# Patient Record
Sex: FEMALE | Race: BLACK OR AFRICAN AMERICAN | Employment: UNEMPLOYED | URBAN - METROPOLITAN AREA
[De-identification: names, ages, dates, MRNs, and addresses within clinical notes are randomized per-mention and may not be internally consistent; named-entity substitution may affect disease eponyms.]

---

## 2017-02-06 DIAGNOSIS — E78.2 MIXED HYPERLIPIDEMIA: ICD-10-CM

## 2017-02-09 RX ORDER — ATORVASTATIN CALCIUM 40 MG/1
TABLET, FILM COATED ORAL
Qty: 30 TABLET | Refills: 5 | Status: SHIPPED | OUTPATIENT
Start: 2017-02-09 | End: 2017-08-05 | Stop reason: SDUPTHER

## 2017-05-23 ENCOUNTER — HOSPITAL ENCOUNTER (OUTPATIENT)
Age: 72
Setting detail: SPECIMEN
Discharge: HOME | End: 2017-05-23
Payer: COMMERCIAL

## 2017-05-23 DIAGNOSIS — N18.2 CKD (CHRONIC KIDNEY DISEASE), STAGE 2 (MILD): ICD-10-CM

## 2017-05-23 LAB
ANION GAP SERPL CALCULATED.3IONS-SCNC: 15 MMOL/L (ref 9–17)
BUN BLDV-MCNC: 21 MG/DL (ref 8–23)
BUN/CREAT BLD: ABNORMAL (ref 9–20)
CALCIUM SERPL-MCNC: 9.8 MG/DL (ref 8.6–10.4)
CHLORIDE BLD-SCNC: 101 MMOL/L (ref 98–107)
CO2: 26 MMOL/L (ref 20–31)
CREAT SERPL-MCNC: 1.21 MG/DL (ref 0.5–0.9)
GFR AFRICAN AMERICAN: 53 ML/MIN
GFR NON-AFRICAN AMERICAN: 44 ML/MIN
GFR SERPL CREATININE-BSD FRML MDRD: ABNORMAL ML/MIN/{1.73_M2}
GFR SERPL CREATININE-BSD FRML MDRD: ABNORMAL ML/MIN/{1.73_M2}
GLUCOSE BLD-MCNC: 94 MG/DL (ref 70–99)
POTASSIUM SERPL-SCNC: 3.8 MMOL/L (ref 3.7–5.3)
SODIUM BLD-SCNC: 142 MMOL/L (ref 135–144)

## 2017-05-23 PROCEDURE — 80048 BASIC METABOLIC PNL TOTAL CA: CPT

## 2017-05-23 PROCEDURE — 36415 COLL VENOUS BLD VENIPUNCTURE: CPT

## 2017-05-25 ENCOUNTER — OFFICE VISIT (OUTPATIENT)
Dept: INTERNAL MEDICINE | Age: 72
End: 2017-05-25
Payer: COMMERCIAL

## 2017-05-25 VITALS
DIASTOLIC BLOOD PRESSURE: 74 MMHG | HEART RATE: 59 BPM | HEIGHT: 63 IN | WEIGHT: 172.2 LBS | BODY MASS INDEX: 30.51 KG/M2 | SYSTOLIC BLOOD PRESSURE: 126 MMHG

## 2017-05-25 DIAGNOSIS — J42 CHRONIC BRONCHITIS, UNSPECIFIED CHRONIC BRONCHITIS TYPE (HCC): ICD-10-CM

## 2017-05-25 DIAGNOSIS — E78.2 MIXED HYPERLIPIDEMIA: ICD-10-CM

## 2017-05-25 DIAGNOSIS — I10 ESSENTIAL HYPERTENSION: ICD-10-CM

## 2017-05-25 DIAGNOSIS — J43.9 PULMONARY EMPHYSEMA, UNSPECIFIED EMPHYSEMA TYPE (HCC): ICD-10-CM

## 2017-05-25 DIAGNOSIS — Z87.891 FORMER CIGAR SMOKER: Primary | ICD-10-CM

## 2017-05-25 DIAGNOSIS — N18.2 CKD (CHRONIC KIDNEY DISEASE), STAGE 2 (MILD): ICD-10-CM

## 2017-05-25 DIAGNOSIS — Z23 NEED FOR TDAP VACCINATION: ICD-10-CM

## 2017-05-25 PROCEDURE — 90471 IMMUNIZATION ADMIN: CPT | Performed by: INTERNAL MEDICINE

## 2017-05-25 PROCEDURE — 99213 OFFICE O/P EST LOW 20 MIN: CPT | Performed by: INTERNAL MEDICINE

## 2017-05-25 PROCEDURE — 90715 TDAP VACCINE 7 YRS/> IM: CPT | Performed by: INTERNAL MEDICINE

## 2017-05-25 RX ORDER — ALBUTEROL SULFATE 90 UG/1
2 AEROSOL, METERED RESPIRATORY (INHALATION) EVERY 6 HOURS PRN
Qty: 1 INHALER | Refills: 5 | Status: SHIPPED | OUTPATIENT
Start: 2017-05-25 | End: 2018-03-26 | Stop reason: SDUPTHER

## 2017-05-25 RX ORDER — AMLODIPINE BESYLATE 2.5 MG/1
TABLET ORAL
Qty: 30 TABLET | Refills: 5 | Status: SHIPPED | OUTPATIENT
Start: 2017-05-25 | End: 2018-01-02 | Stop reason: SDUPTHER

## 2017-06-29 ENCOUNTER — APPOINTMENT (OUTPATIENT)
Dept: GENERAL RADIOLOGY | Age: 72
End: 2017-06-29
Payer: COMMERCIAL

## 2017-06-29 ENCOUNTER — HOSPITAL ENCOUNTER (OUTPATIENT)
Age: 72
Setting detail: OBSERVATION
Discharge: HOME | End: 2017-06-30
Attending: EMERGENCY MEDICINE | Admitting: EMERGENCY MEDICINE
Payer: COMMERCIAL

## 2017-06-29 DIAGNOSIS — R94.31 ABNORMAL EKG: ICD-10-CM

## 2017-06-29 DIAGNOSIS — R10.13 ABDOMINAL PAIN, EPIGASTRIC: Primary | ICD-10-CM

## 2017-06-29 LAB
ALBUMIN SERPL-MCNC: 4.3 G/DL (ref 3.5–5.2)
ALBUMIN/GLOBULIN RATIO: 1.4 (ref 1–2.5)
ALP BLD-CCNC: 91 U/L (ref 35–104)
ALT SERPL-CCNC: 36 U/L (ref 5–33)
ANION GAP SERPL CALCULATED.3IONS-SCNC: 13 MMOL/L (ref 9–17)
AST SERPL-CCNC: 66 U/L
BILIRUB SERPL-MCNC: 0.52 MG/DL (ref 0.3–1.2)
BILIRUBIN DIRECT: 0.19 MG/DL
BILIRUBIN, INDIRECT: 0.33 MG/DL (ref 0–1)
BUN BLDV-MCNC: 21 MG/DL (ref 8–23)
BUN/CREAT BLD: ABNORMAL (ref 9–20)
CALCIUM SERPL-MCNC: 10.2 MG/DL (ref 8.6–10.4)
CHLORIDE BLD-SCNC: 99 MMOL/L (ref 98–107)
CO2: 26 MMOL/L (ref 20–31)
CREAT SERPL-MCNC: 1.1 MG/DL (ref 0.5–0.9)
GFR AFRICAN AMERICAN: 60 ML/MIN
GFR NON-AFRICAN AMERICAN: 49 ML/MIN
GFR SERPL CREATININE-BSD FRML MDRD: ABNORMAL ML/MIN/{1.73_M2}
GFR SERPL CREATININE-BSD FRML MDRD: ABNORMAL ML/MIN/{1.73_M2}
GLOBULIN: ABNORMAL G/DL (ref 1.5–3.8)
GLUCOSE BLD-MCNC: 126 MG/DL (ref 70–99)
LACTIC ACID, WHOLE BLOOD: 1.9 MMOL/L (ref 0.7–2.1)
LIPASE: 34 U/L (ref 13–60)
POTASSIUM SERPL-SCNC: 3.8 MMOL/L (ref 3.7–5.3)
SODIUM BLD-SCNC: 138 MMOL/L (ref 135–144)
TOTAL PROTEIN: 7.4 G/DL (ref 6.4–8.3)
TROPONIN INTERP: NORMAL
TROPONIN T: <0.03 NG/ML

## 2017-06-29 PROCEDURE — 99285 EMERGENCY DEPT VISIT HI MDM: CPT

## 2017-06-29 PROCEDURE — 96374 THER/PROPH/DIAG INJ IV PUSH: CPT

## 2017-06-29 PROCEDURE — 36415 COLL VENOUS BLD VENIPUNCTURE: CPT

## 2017-06-29 PROCEDURE — 96375 TX/PRO/DX INJ NEW DRUG ADDON: CPT

## 2017-06-29 PROCEDURE — 93005 ELECTROCARDIOGRAM TRACING: CPT

## 2017-06-29 PROCEDURE — 96376 TX/PRO/DX INJ SAME DRUG ADON: CPT

## 2017-06-29 PROCEDURE — G0378 HOSPITAL OBSERVATION PER HR: HCPCS

## 2017-06-29 PROCEDURE — 2500000003 HC RX 250 WO HCPCS: Performed by: EMERGENCY MEDICINE

## 2017-06-29 PROCEDURE — 83605 ASSAY OF LACTIC ACID: CPT

## 2017-06-29 PROCEDURE — 84484 ASSAY OF TROPONIN QUANT: CPT

## 2017-06-29 PROCEDURE — 96372 THER/PROPH/DIAG INJ SC/IM: CPT

## 2017-06-29 PROCEDURE — 80048 BASIC METABOLIC PNL TOTAL CA: CPT

## 2017-06-29 PROCEDURE — 80076 HEPATIC FUNCTION PANEL: CPT

## 2017-06-29 PROCEDURE — 6360000002 HC RX W HCPCS: Performed by: EMERGENCY MEDICINE

## 2017-06-29 PROCEDURE — 6370000000 HC RX 637 (ALT 250 FOR IP): Performed by: EMERGENCY MEDICINE

## 2017-06-29 PROCEDURE — S0028 INJECTION, FAMOTIDINE, 20 MG: HCPCS | Performed by: EMERGENCY MEDICINE

## 2017-06-29 PROCEDURE — 2580000003 HC RX 258: Performed by: EMERGENCY MEDICINE

## 2017-06-29 PROCEDURE — 83690 ASSAY OF LIPASE: CPT

## 2017-06-29 PROCEDURE — 71010 XR CHEST PORTABLE: CPT

## 2017-06-29 RX ORDER — FENTANYL CITRATE 50 UG/ML
25 INJECTION, SOLUTION INTRAMUSCULAR; INTRAVENOUS ONCE
Status: COMPLETED | OUTPATIENT
Start: 2017-06-29 | End: 2017-06-29

## 2017-06-29 RX ORDER — ACETAMINOPHEN 325 MG/1
650 TABLET ORAL EVERY 4 HOURS PRN
Status: DISCONTINUED | OUTPATIENT
Start: 2017-06-29 | End: 2017-06-30 | Stop reason: HOSPADM

## 2017-06-29 RX ORDER — MORPHINE SULFATE 2 MG/ML
2 INJECTION, SOLUTION INTRAMUSCULAR; INTRAVENOUS
Status: DISCONTINUED | OUTPATIENT
Start: 2017-06-29 | End: 2017-06-30 | Stop reason: HOSPADM

## 2017-06-29 RX ORDER — HYDROCHLOROTHIAZIDE 25 MG/1
25 TABLET ORAL DAILY
Status: DISCONTINUED | OUTPATIENT
Start: 2017-06-29 | End: 2017-06-30 | Stop reason: HOSPADM

## 2017-06-29 RX ORDER — LISINOPRIL 20 MG/1
20 TABLET ORAL DAILY
Status: DISCONTINUED | OUTPATIENT
Start: 2017-06-29 | End: 2017-06-30 | Stop reason: HOSPADM

## 2017-06-29 RX ORDER — ALBUTEROL SULFATE 90 UG/1
2 AEROSOL, METERED RESPIRATORY (INHALATION) EVERY 6 HOURS PRN
Status: DISCONTINUED | OUTPATIENT
Start: 2017-06-29 | End: 2017-06-30 | Stop reason: HOSPADM

## 2017-06-29 RX ORDER — SODIUM CHLORIDE 0.9 % (FLUSH) 0.9 %
10 SYRINGE (ML) INJECTION PRN
Status: DISCONTINUED | OUTPATIENT
Start: 2017-06-29 | End: 2017-06-30 | Stop reason: HOSPADM

## 2017-06-29 RX ORDER — 0.9 % SODIUM CHLORIDE 0.9 %
1000 INTRAVENOUS SOLUTION INTRAVENOUS ONCE
Status: COMPLETED | OUTPATIENT
Start: 2017-06-29 | End: 2017-06-29

## 2017-06-29 RX ORDER — SODIUM CHLORIDE 0.9 % (FLUSH) 0.9 %
10 SYRINGE (ML) INJECTION EVERY 12 HOURS SCHEDULED
Status: DISCONTINUED | OUTPATIENT
Start: 2017-06-29 | End: 2017-06-30 | Stop reason: HOSPADM

## 2017-06-29 RX ORDER — MORPHINE SULFATE 4 MG/ML
4 INJECTION, SOLUTION INTRAMUSCULAR; INTRAVENOUS
Status: DISCONTINUED | OUTPATIENT
Start: 2017-06-29 | End: 2017-06-30 | Stop reason: HOSPADM

## 2017-06-29 RX ORDER — ONDANSETRON 2 MG/ML
4 INJECTION INTRAMUSCULAR; INTRAVENOUS ONCE
Status: COMPLETED | OUTPATIENT
Start: 2017-06-29 | End: 2017-06-29

## 2017-06-29 RX ORDER — LISINOPRIL AND HYDROCHLOROTHIAZIDE 25; 20 MG/1; MG/1
1 TABLET ORAL DAILY
Status: DISCONTINUED | OUTPATIENT
Start: 2017-06-29 | End: 2017-06-29

## 2017-06-29 RX ORDER — DICYCLOMINE HYDROCHLORIDE 10 MG/ML
20 INJECTION INTRAMUSCULAR ONCE
Status: DISCONTINUED | OUTPATIENT
Start: 2017-06-29 | End: 2017-06-30 | Stop reason: HOSPADM

## 2017-06-29 RX ORDER — AMLODIPINE BESYLATE 2.5 MG/1
2.5 TABLET ORAL DAILY
Status: DISCONTINUED | OUTPATIENT
Start: 2017-06-29 | End: 2017-06-30 | Stop reason: HOSPADM

## 2017-06-29 RX ORDER — ATORVASTATIN CALCIUM 40 MG/1
40 TABLET, FILM COATED ORAL NIGHTLY
Status: DISCONTINUED | OUTPATIENT
Start: 2017-06-29 | End: 2017-06-30 | Stop reason: HOSPADM

## 2017-06-29 RX ADMIN — Medication 10 ML: at 20:24

## 2017-06-29 RX ADMIN — FENTANYL CITRATE 25 MCG: 50 INJECTION, SOLUTION INTRAMUSCULAR; INTRAVENOUS at 14:41

## 2017-06-29 RX ADMIN — SODIUM CHLORIDE 1000 ML: 9 INJECTION, SOLUTION INTRAVENOUS at 13:16

## 2017-06-29 RX ADMIN — ATORVASTATIN CALCIUM 40 MG: 40 TABLET, FILM COATED ORAL at 20:23

## 2017-06-29 RX ADMIN — FAMOTIDINE 20 MG: 10 INJECTION, SOLUTION INTRAVENOUS at 13:16

## 2017-06-29 RX ADMIN — ONDANSETRON 4 MG: 2 INJECTION, SOLUTION INTRAMUSCULAR; INTRAVENOUS at 13:16

## 2017-06-29 RX ADMIN — ENOXAPARIN SODIUM 40 MG: 40 INJECTION SUBCUTANEOUS at 17:40

## 2017-06-29 RX ADMIN — FENTANYL CITRATE 25 MCG: 50 INJECTION, SOLUTION INTRAMUSCULAR; INTRAVENOUS at 13:16

## 2017-06-29 RX ADMIN — MORPHINE SULFATE 4 MG: 4 INJECTION, SOLUTION INTRAMUSCULAR; INTRAVENOUS at 20:24

## 2017-06-29 ASSESSMENT — PAIN DESCRIPTION - ORIENTATION
ORIENTATION: MID;UPPER
ORIENTATION: MID;UPPER

## 2017-06-29 ASSESSMENT — PAIN SCALES - GENERAL
PAINLEVEL_OUTOF10: 8
PAINLEVEL_OUTOF10: 7
PAINLEVEL_OUTOF10: 8
PAINLEVEL_OUTOF10: 0
PAINLEVEL_OUTOF10: 8
PAINLEVEL_OUTOF10: 6

## 2017-06-29 ASSESSMENT — ENCOUNTER SYMPTOMS
COLOR CHANGE: 0
ABDOMINAL PAIN: 1
BLOOD IN STOOL: 0
CONSTIPATION: 0
DIARRHEA: 0
SHORTNESS OF BREATH: 0
NAUSEA: 1
VOMITING: 0
WHEEZING: 0

## 2017-06-29 ASSESSMENT — PAIN DESCRIPTION - DESCRIPTORS
DESCRIPTORS: ACHING
DESCRIPTORS: ACHING

## 2017-06-29 ASSESSMENT — PAIN DESCRIPTION - PAIN TYPE
TYPE: ACUTE PAIN
TYPE: ACUTE PAIN;CHRONIC PAIN

## 2017-06-29 ASSESSMENT — PAIN DESCRIPTION - FREQUENCY
FREQUENCY: CONTINUOUS
FREQUENCY: CONTINUOUS

## 2017-06-29 ASSESSMENT — PAIN DESCRIPTION - LOCATION
LOCATION: CHEST
LOCATION: ABDOMEN

## 2017-06-29 ASSESSMENT — HEART SCORE: ECG: 1

## 2017-06-29 ASSESSMENT — PAIN DESCRIPTION - ONSET
ONSET: SUDDEN
ONSET: ON-GOING

## 2017-06-30 ENCOUNTER — APPOINTMENT (OUTPATIENT)
Dept: NUCLEAR MEDICINE | Age: 72
End: 2017-06-30
Payer: COMMERCIAL

## 2017-06-30 VITALS
DIASTOLIC BLOOD PRESSURE: 77 MMHG | OXYGEN SATURATION: 94 % | TEMPERATURE: 97.9 F | BODY MASS INDEX: 30.49 KG/M2 | HEART RATE: 58 BPM | RESPIRATION RATE: 15 BRPM | SYSTOLIC BLOOD PRESSURE: 118 MMHG | HEIGHT: 63 IN | WEIGHT: 172.1 LBS

## 2017-06-30 LAB
EKG ATRIAL RATE: 48 BPM
EKG ATRIAL RATE: 51 BPM
EKG ATRIAL RATE: 53 BPM
EKG ATRIAL RATE: 71 BPM
EKG P AXIS: 61 DEGREES
EKG P AXIS: 68 DEGREES
EKG P AXIS: 70 DEGREES
EKG P AXIS: 73 DEGREES
EKG P-R INTERVAL: 158 MS
EKG P-R INTERVAL: 158 MS
EKG P-R INTERVAL: 160 MS
EKG P-R INTERVAL: 160 MS
EKG Q-T INTERVAL: 382 MS
EKG Q-T INTERVAL: 442 MS
EKG Q-T INTERVAL: 452 MS
EKG Q-T INTERVAL: 476 MS
EKG QRS DURATION: 88 MS
EKG QRS DURATION: 90 MS
EKG QRS DURATION: 96 MS
EKG QRS DURATION: 98 MS
EKG QTC CALCULATION (BAZETT): 414 MS
EKG QTC CALCULATION (BAZETT): 415 MS
EKG QTC CALCULATION (BAZETT): 416 MS
EKG QTC CALCULATION (BAZETT): 425 MS
EKG R AXIS: 11 DEGREES
EKG R AXIS: 11 DEGREES
EKG R AXIS: 25 DEGREES
EKG R AXIS: 4 DEGREES
EKG T AXIS: 23 DEGREES
EKG T AXIS: 33 DEGREES
EKG T AXIS: 34 DEGREES
EKG T AXIS: 62 DEGREES
EKG VENTRICULAR RATE: 48 BPM
EKG VENTRICULAR RATE: 51 BPM
EKG VENTRICULAR RATE: 53 BPM
EKG VENTRICULAR RATE: 71 BPM
LV EF: 55 %
LVEF MODALITY: NORMAL

## 2017-06-30 PROCEDURE — 2580000003 HC RX 258: Performed by: EMERGENCY MEDICINE

## 2017-06-30 PROCEDURE — 93005 ELECTROCARDIOGRAM TRACING: CPT

## 2017-06-30 PROCEDURE — 78452 HT MUSCLE IMAGE SPECT MULT: CPT

## 2017-06-30 PROCEDURE — A9500 TC99M SESTAMIBI: HCPCS | Performed by: EMERGENCY MEDICINE

## 2017-06-30 PROCEDURE — 3430000000 HC RX DIAGNOSTIC RADIOPHARMACEUTICAL: Performed by: EMERGENCY MEDICINE

## 2017-06-30 PROCEDURE — 6360000002 HC RX W HCPCS: Performed by: EMERGENCY MEDICINE

## 2017-06-30 PROCEDURE — G0378 HOSPITAL OBSERVATION PER HR: HCPCS

## 2017-06-30 PROCEDURE — 93017 CV STRESS TEST TRACING ONLY: CPT | Performed by: NURSE PRACTITIONER

## 2017-06-30 PROCEDURE — 96376 TX/PRO/DX INJ SAME DRUG ADON: CPT

## 2017-06-30 RX ORDER — METOPROLOL TARTRATE 5 MG/5ML
2.5 INJECTION INTRAVENOUS PRN
Status: DISCONTINUED | OUTPATIENT
Start: 2017-06-30 | End: 2017-06-30

## 2017-06-30 RX ORDER — AMINOPHYLLINE DIHYDRATE 25 MG/ML
100 INJECTION, SOLUTION INTRAVENOUS
Status: COMPLETED | OUTPATIENT
Start: 2017-06-30 | End: 2017-06-30

## 2017-06-30 RX ORDER — 0.9 % SODIUM CHLORIDE 0.9 %
500 INTRAVENOUS SOLUTION INTRAVENOUS ONCE
Status: COMPLETED | OUTPATIENT
Start: 2017-06-30 | End: 2017-06-30

## 2017-06-30 RX ORDER — SODIUM CHLORIDE 0.9 % (FLUSH) 0.9 %
10 SYRINGE (ML) INJECTION PRN
Status: DISCONTINUED | OUTPATIENT
Start: 2017-06-30 | End: 2017-06-30

## 2017-06-30 RX ORDER — SODIUM CHLORIDE 9 MG/ML
INJECTION, SOLUTION INTRAVENOUS ONCE
Status: COMPLETED | OUTPATIENT
Start: 2017-06-30 | End: 2017-06-30

## 2017-06-30 RX ORDER — SODIUM CHLORIDE 0.9 % (FLUSH) 0.9 %
10 SYRINGE (ML) INJECTION PRN
Status: DISCONTINUED | OUTPATIENT
Start: 2017-06-30 | End: 2017-06-30 | Stop reason: HOSPADM

## 2017-06-30 RX ORDER — NITROGLYCERIN 0.4 MG/1
0.4 TABLET SUBLINGUAL EVERY 5 MIN PRN
Status: DISCONTINUED | OUTPATIENT
Start: 2017-06-30 | End: 2017-06-30

## 2017-06-30 RX ADMIN — SODIUM CHLORIDE, PRESERVATIVE FREE 10 ML: 5 INJECTION INTRAVENOUS at 10:21

## 2017-06-30 RX ADMIN — TETRAKIS(2-METHOXYISOBUTYLISOCYANIDE)COPPER(I) TETRAFLUOROBORATE 15.4 MILLICURIE: 1 INJECTION, POWDER, LYOPHILIZED, FOR SOLUTION INTRAVENOUS at 10:21

## 2017-06-30 RX ADMIN — SODIUM CHLORIDE, PRESERVATIVE FREE 10 ML: 5 INJECTION INTRAVENOUS at 11:54

## 2017-06-30 RX ADMIN — SODIUM CHLORIDE 500 ML: 0.9 INJECTION, SOLUTION INTRAVENOUS at 08:43

## 2017-06-30 RX ADMIN — AMINOPHYLLINE 100 MG: 25 INJECTION, SOLUTION INTRAVENOUS at 11:56

## 2017-06-30 RX ADMIN — SODIUM CHLORIDE: 9 INJECTION, SOLUTION INTRAVENOUS at 11:45

## 2017-06-30 RX ADMIN — REGADENOSON 0.4 MG: 0.08 INJECTION, SOLUTION INTRAVENOUS at 11:54

## 2017-06-30 RX ADMIN — MORPHINE SULFATE 4 MG: 4 INJECTION, SOLUTION INTRAMUSCULAR; INTRAVENOUS at 01:00

## 2017-06-30 RX ADMIN — TETRAKIS(2-METHOXYISOBUTYLISOCYANIDE)COPPER(I) TETRAFLUOROBORATE 41.8 MILLICURIE: 1 INJECTION, POWDER, LYOPHILIZED, FOR SOLUTION INTRAVENOUS at 11:54

## 2017-06-30 ASSESSMENT — PAIN DESCRIPTION - LOCATION: LOCATION: CHEST

## 2017-06-30 ASSESSMENT — PAIN DESCRIPTION - ORIENTATION: ORIENTATION: MID;UPPER

## 2017-06-30 ASSESSMENT — PAIN DESCRIPTION - FREQUENCY: FREQUENCY: CONTINUOUS

## 2017-06-30 ASSESSMENT — PAIN DESCRIPTION - PAIN TYPE: TYPE: ACUTE PAIN

## 2017-06-30 ASSESSMENT — PAIN SCALES - GENERAL: PAINLEVEL_OUTOF10: 9

## 2017-06-30 ASSESSMENT — PAIN DESCRIPTION - DESCRIPTORS: DESCRIPTORS: ACHING

## 2017-06-30 ASSESSMENT — PAIN DESCRIPTION - ONSET: ONSET: ON-GOING

## 2017-08-05 DIAGNOSIS — E78.2 MIXED HYPERLIPIDEMIA: ICD-10-CM

## 2017-08-07 RX ORDER — ATORVASTATIN CALCIUM 40 MG/1
TABLET, FILM COATED ORAL
Qty: 30 TABLET | Refills: 5 | Status: SHIPPED | OUTPATIENT
Start: 2017-08-07 | End: 2018-02-01 | Stop reason: SDUPTHER

## 2017-11-03 DIAGNOSIS — N18.30 STAGE 3 CHRONIC KIDNEY DISEASE (HCC): ICD-10-CM

## 2017-11-03 DIAGNOSIS — I10 ESSENTIAL HYPERTENSION: ICD-10-CM

## 2017-11-03 NOTE — TELEPHONE ENCOUNTER
karan request for LISINOPRIL-HCTZ 20-25 MG TAB  future appointment scheduled.     Health Maintenance   Topic Date Due    Flu vaccine (1) 09/01/2017    Diabetes screen  07/01/2018    Breast cancer screen  11/14/2018    Lipid screen  10/18/2021    Colon cancer screen colonoscopy  11/27/2022    DTaP/Tdap/Td vaccine (2 - Td) 05/25/2027    Zostavax vaccine  Completed    DEXA (modify frequency per FRAX score)  Completed    Pneumococcal low/med risk  Completed    Hepatitis C screen  Completed             (applicable per patient's age: Cancer Screenings, Depression Screening, Fall Risk Screening, Immunizations)    Hemoglobin A1C (%)   Date Value   07/01/2015 5.7     LDL Cholesterol (mg/dL)   Date Value   10/18/2016 64     AST (U/L)   Date Value   06/29/2017 66 (H)     ALT (U/L)   Date Value   06/29/2017 36 (H)     BUN (mg/dL)   Date Value   06/29/2017 21      (goal A1C is < 7)   (goal LDL is <100) need 30-50% reduction from baseline     BP Readings from Last 3 Encounters:   06/30/17 118/77   05/25/17 126/74   11/17/16 (!) 142/69    (goal /80)      All Future Testing planned in [facility]:  Lab Frequency Next Occurrence   Lipid Panel Once 11/20/2017       Next Visit Date:  Future Appointments  Date Time Provider Suresh Powell   11/27/2017 8:30 AM Dominick Villatoro MD [facility] IM [facility]            Patient Active Problem List:     HTN (hypertension)     Mitral valve prolapse     Former cigar smoker     CKD (chronic kidney disease)     Ingrown nail of great toe of left foot     Mixed hyperlipidemia     Breast cancer screening     Chronic bronchitis ([facility] [address]     Need for Tdap vaccination

## 2017-11-04 RX ORDER — LISINOPRIL AND HYDROCHLOROTHIAZIDE 25; 20 MG/1; MG/1
TABLET ORAL
Qty: 30 TABLET | Refills: 5 | Status: SHIPPED | OUTPATIENT
Start: 2017-11-04 | End: 2017-12-18

## 2017-11-28 ENCOUNTER — TELEPHONE (OUTPATIENT)
Dept: INTERNAL MEDICINE | Age: 72
End: 2017-11-28

## 2017-11-28 NOTE — LETTER
SAVANAH Flanagan 43  [address] [facility] [address] [facility]  Phone: [phone]  Fax: [phone]    Gurmeet Montez MD        November 28, 2017    [address] [place] [address]      Dear Shashank Abdi: We are sending this letter because your PCP ordered [facility] for you to have done at your last visit here and they have not yet been completed. If you do not have the order, please stop by the office to get a print out of the order. If you do not have a follow-up appointment scheduled, please contact our office to set up an appointment, or if you stop to get a printout of the order you can make an appointment at that time. If you have any questions or concerns, please don't hesitate to call.     Sincerely,        Gurmeet Montez MD

## 2017-12-14 ENCOUNTER — HOSPITAL ENCOUNTER (OUTPATIENT)
Age: 72
Setting detail: SPECIMEN
Discharge: HOME | End: 2017-12-14
Payer: COMMERCIAL

## 2017-12-14 DIAGNOSIS — E78.2 MIXED HYPERLIPIDEMIA: ICD-10-CM

## 2017-12-14 LAB
CHOLESTEROL/HDL RATIO: 2.1
CHOLESTEROL: 152 MG/DL
HDLC SERPL-MCNC: 72 MG/DL
LDL CHOLESTEROL: 51 MG/DL (ref 0–130)
TRIGL SERPL-MCNC: 145 MG/DL
VLDLC SERPL CALC-MCNC: NORMAL MG/DL (ref 1–30)

## 2017-12-14 PROCEDURE — 36415 COLL VENOUS BLD VENIPUNCTURE: CPT

## 2017-12-14 PROCEDURE — 80061 LIPID PANEL: CPT

## 2017-12-18 ENCOUNTER — OFFICE VISIT (OUTPATIENT)
Dept: INTERNAL MEDICINE | Age: 72
End: 2017-12-18
Payer: COMMERCIAL

## 2017-12-18 VITALS
DIASTOLIC BLOOD PRESSURE: 81 MMHG | WEIGHT: 170 LBS | HEIGHT: 63 IN | BODY MASS INDEX: 30.12 KG/M2 | HEART RATE: 56 BPM | SYSTOLIC BLOOD PRESSURE: 136 MMHG

## 2017-12-18 DIAGNOSIS — N18.30 STAGE 3 CHRONIC KIDNEY DISEASE (HCC): ICD-10-CM

## 2017-12-18 DIAGNOSIS — I10 ESSENTIAL HYPERTENSION: Primary | ICD-10-CM

## 2017-12-18 DIAGNOSIS — J41.1 MUCOPURULENT CHRONIC BRONCHITIS (HCC): ICD-10-CM

## 2017-12-18 PROBLEM — Z23 NEED FOR TDAP VACCINATION: Status: RESOLVED | Noted: 2017-05-25 | Resolved: 2017-12-18

## 2017-12-18 PROCEDURE — 99214 OFFICE O/P EST MOD 30 MIN: CPT | Performed by: STUDENT IN AN ORGANIZED HEALTH CARE EDUCATION/TRAINING PROGRAM

## 2017-12-18 RX ORDER — VALSARTAN AND HYDROCHLOROTHIAZIDE 160; 25 MG/1; MG/1
1 TABLET ORAL DAILY
Qty: 30 TABLET | Refills: 2 | Status: SHIPPED | OUTPATIENT
Start: 2017-12-18 | End: 2018-03-26 | Stop reason: SDUPTHER

## 2017-12-18 ASSESSMENT — PATIENT HEALTH QUESTIONNAIRE - PHQ9
SUM OF ALL RESPONSES TO PHQ QUESTIONS 1-9: 0
5. POOR APPETITE OR OVEREATING: 0
7. TROUBLE CONCENTRATING ON THINGS, SUCH AS READING THE NEWSPAPER OR WATCHING TELEVISION: 0
10. IF YOU CHECKED OFF ANY PROBLEMS, HOW DIFFICULT HAVE THESE PROBLEMS MADE IT FOR YOU TO DO YOUR WORK, TAKE CARE OF THINGS AT HOME, OR GET ALONG WITH OTHER PEOPLE: 0
9. THOUGHTS THAT YOU WOULD BE BETTER OFF DEAD, OR OF HURTING YOURSELF: 0
4. FEELING TIRED OR HAVING LITTLE ENERGY: 0
6. FEELING BAD ABOUT YOURSELF - OR THAT YOU ARE A FAILURE OR HAVE LET YOURSELF OR YOUR FAMILY DOWN: 0
1. LITTLE INTEREST OR PLEASURE IN DOING THINGS: 0
2. FEELING DOWN, DEPRESSED OR HOPELESS: 0
8. MOVING OR SPEAKING SO SLOWLY THAT OTHER PEOPLE COULD HAVE NOTICED. OR THE OPPOSITE, BEING SO FIGETY OR RESTLESS THAT YOU HAVE BEEN MOVING AROUND A LOT MORE THAN USUAL: 0
3. TROUBLE FALLING OR STAYING ASLEEP: 0
SUM OF ALL RESPONSES TO PHQ9 QUESTIONS 1 & 2: 0

## 2017-12-18 NOTE — PROGRESS NOTES
Zhao Flanagan  reports that she quit smoking about 3 years ago. Her smoking use included Cigarettes. She smoked 0.20 packs per day. She has never used smokeless tobacco.    FAMILY HISTORY:    No family history on file. REVIEW OF SYSTEMS:    · General: no significant weight changes. · Dermatological: no abnormal pigmentation, rash, or itching. · Ears/Nose/Throat: no hearing loss, tinnitus, vertigo, nosebleed, chronic nasal congestion, rhinorrhea, sore throat. · Respiratory: no cough, pleuritic chest pain, dyspnea, or wheezing  · Cardiovascular: no pain, dyspnea on exertion, orthopnea, palpitations, or claudication  · Gastrointestinal: no nausea, vomiting, heartburn, diarrhea, constipation, bloating, or abdominal pain. No bloody or black stools. · Genito-Urinary: no urinary urgency, frequency, dysuria, nocturia, hesitancy, incontinence, or pain. No hematuria  · Musculoskeletal: no arthralgia, myalgia, weakness, or morning stiffness  · Neurologic: no TIA or stroke symptoms. no paralysis, or frequent or severe headaches  · Hematologic/Lymphatic/Immunologic: no abnormal bleeding/bruising, fever, chills, night sweats orswollen glands. · Endocrine: no heat or cold intolerance and no polyuria    PHYSICAL EXAM:      Vitals:    12/18/17 1408   BP: 136/81   Pulse: 56     BP Readings from Last 3 Encounters:   12/18/17 136/81   06/30/17 118/77   05/25/17 126/74       Physical Examination: General appearance - alert, well appearing, and in no distress  Mental status - alert, oriented to person, place, and time  Eyes - pupils equal and reactive, extraocular eye movements intact  Ears - bilateral TM's and external ear canals normal  Mouth - mucous membranes moist, pharynx normal without lesions  Neck - supple, no significant adenopathy  Chest - Fine end inspiratory crackles, otherwise symmetric air entry, no orthopnea, no paroxsymal nocturnal dyspnea.   Heart - normal rate, regular rhythm, normal S1, S2, no murmurs, rubs, clicks or gallops  Abdomen - soft, nontender, nondistended, no masses or organomegaly  Extremities - peripheral pulses normal, no pedal edema, no clubbing or cyanosis  Skin - normal coloration and turgor, no rashes, no suspicious skin lesions noted    LABORATORY FINDINGS:    CBC:   Lab Results   Component Value Date    WBC 9.0 12/02/2014    HGB 13.1 12/02/2014     12/02/2014     05/25/2012     BMP:    Lab Results   Component Value Date     06/29/2017    K 3.8 06/29/2017    CL 99 06/29/2017    CO2 26 06/29/2017    BUN 21 06/29/2017    CREATININE 1.10 06/29/2017    GLUCOSE 126 06/29/2017    GLUCOSE 81 05/25/2012     Hemoglobin A1C:   Lab Results   Component Value Date    LABA1C 5.7 07/01/2015     Microalbumin Urine: No results found for: MICROALBUR  Lipid profile:   Lab Results   Component Value Date    CHOL 152 12/14/2017    TRIG 145 12/14/2017    HDL 72 12/14/2017     Thyroid functions:   Lab Results   Component Value Date    TSH 1.06 05/25/2012      Hepatic functions:   Lab Results   Component Value Date    ALT 36 06/29/2017    AST 66 06/29/2017    PROT 7.4 06/29/2017    BILITOT 0.52 06/29/2017    BILIDIR 0.19 06/29/2017    LABALBU 4.3 06/29/2017    LABALBU 4.5 05/25/2012     Urine Analysis: No results found for: [id number] Theodore:      Health Maintenance Due   Topic Date Due    Flu vaccine (1) 09/01/2017       ASSESSMENT AND PLAN:    There are no diagnoses linked to this encounter. Patient Active Problem List   Diagnosis    HTN (hypertension): Stable. Cont current management, will only replace lisinopril-Hctz combination will with Arb-Hctz pill - Diovan /25. Cont Norvasc     Former cigar smoker, Chronic bronchitis ([address]): Counseled for quitting. Add Spiriva.  CKD (chronic kidney disease): Stable. Will repeat BMP on this visit.  Mixed hyperlipidemia: Cont current management with Lipitor 40. FOLLOW UP:   1.   Saul received counseling on the following healthy

## 2017-12-18 NOTE — PATIENT INSTRUCTIONS
Return To Clinic 3/26/18. After Visit Summary given and reviewed. bb    It is very important for your care that you keep your appointment. If for some reason you are unable to keep your appointment it is equally important that you call our office at [phone] to cancel your appointment and reschedule. Failure to do so may result in your termination from our practice. LABORATORY INSTRUCTIONS    Your doctor has ordered blood or urine testing. You can get this testing done at the Lab located on the first floor of the [facility], or at any other [facility]. Please stop at Main Registration, before going to the lab, as you must be registered first.     Please get this lab done before your next visit.     You may eat or drink before this test.

## 2017-12-18 NOTE — PROGRESS NOTES
Attending Physician Statement [facility])  Internal Medicine Clinic Progress Note    SUBJECTIVE:    Chief Complaint   Patient presents with    Hypertension    Discuss Medications     was told by ED that she should not be taking lisinopril due to her throat swelling up    [facility] Maintenance     had flu shot done already          [facility], is here for a follow up visit. The patient is complaining of dry cough which has been ongoing for a few months now. She is on lisinoprilhydrochlorothiazide for management of blood pressure. She has chronic COPD for which she is on albuterol inhalers. She reported that she uses her albuterol inhaler almost on a daily basis. She continued to smoke but has cut down significantly to 3 cigarettes a day. She recently completed a stress test for evaluation of chest pain which was normal.  Problem list, medications and blood work reviewed      OBJECTIVE:  Patient Active Problem List   Diagnosis    Essential hypertension    Mitral valve prolapse    Stage 3 chronic kidney disease    Mixed hyperlipidemia    Chronic bronchitis ([address]       Physical Examination:   Vitals:    12/18/17 1408   BP: 136/81   Pulse: 56         ASSESSMENT:  1. Essential hypertension    2.  Mucopurulent chronic bronchitis (HCC)    3. Stage 3 chronic kidney disease          PLAN  · Follow up in 3 months  · Discontinue lisinoprilhydrochlorothiazide  · Start Diovan 25 one tablet daily  · Start Spiriva  · Advised patient to stop smoking  · Continue albuterol as needed  · Repeat BMP follow-up and CK D stage III    Orders Placed This Encounter   Procedures    Basic Metabolic Panel       Orders Placed This Encounter   Medications    valsartan-hydrochlorothiazide (DIOVAN HCT) 160-25 MG per tablet     Sig: Take 1 tablet by mouth daily DC Lisinopril-HCTZ     Dispense:  30 tablet     Refill:  2    tiotropium (SPIRIVA HANDIHALER) 18 MCG inhalation capsule     Sig: Inhale 1 capsule into the lungs daily Dispense:  30 capsule     Refill:  2         I have discussed the care of Helen Amaya, including pertinent history and exam findings, with the resident. I have seen and examined the patient and the key elements of all parts of the encounter have been performed by me. I agree with the assessment, plan and orders as documented by the resident.     Brandi Fitzpatrick MD, RAFY  Attending Physician, [address] [place] [address], Internal Medicine Residency Program  Lisette  12/18/2017, 3:21 PM

## 2017-12-19 ENCOUNTER — HOSPITAL ENCOUNTER (OUTPATIENT)
Age: 72
Setting detail: SPECIMEN
Discharge: HOME | End: 2017-12-19
Payer: COMMERCIAL

## 2017-12-19 DIAGNOSIS — N18.30 STAGE 3 CHRONIC KIDNEY DISEASE (HCC): ICD-10-CM

## 2017-12-19 LAB
ANION GAP SERPL CALCULATED.3IONS-SCNC: 15 MMOL/L (ref 9–17)
BUN BLDV-MCNC: 19 MG/DL (ref 8–23)
BUN/CREAT BLD: ABNORMAL (ref 9–20)
CALCIUM SERPL-MCNC: 9.7 MG/DL (ref 8.6–10.4)
CHLORIDE BLD-SCNC: 100 MMOL/L (ref 98–107)
CO2: 26 MMOL/L (ref 20–31)
CREAT SERPL-MCNC: 1.15 MG/DL (ref 0.5–0.9)
GFR AFRICAN AMERICAN: 57 ML/MIN
GFR NON-AFRICAN AMERICAN: 47 ML/MIN
GFR SERPL CREATININE-BSD FRML MDRD: ABNORMAL ML/MIN/{1.73_M2}
GFR SERPL CREATININE-BSD FRML MDRD: ABNORMAL ML/MIN/{1.73_M2}
GLUCOSE BLD-MCNC: 76 MG/DL (ref 70–99)
POTASSIUM SERPL-SCNC: 3.6 MMOL/L (ref 3.7–5.3)
SODIUM BLD-SCNC: 141 MMOL/L (ref 135–144)

## 2017-12-19 PROCEDURE — 80048 BASIC METABOLIC PNL TOTAL CA: CPT

## 2017-12-19 PROCEDURE — 36415 COLL VENOUS BLD VENIPUNCTURE: CPT

## 2018-01-26 ENCOUNTER — TELEPHONE (OUTPATIENT)
Dept: INTERNAL MEDICINE | Age: 73
End: 2018-01-26

## 2018-01-27 DIAGNOSIS — J44.9 CHRONIC OBSTRUCTIVE PULMONARY DISEASE, UNSPECIFIED COPD TYPE (HCC): Primary | ICD-10-CM

## 2018-02-01 DIAGNOSIS — E78.2 MIXED HYPERLIPIDEMIA: ICD-10-CM

## 2018-02-01 RX ORDER — ATORVASTATIN CALCIUM 40 MG/1
TABLET, FILM COATED ORAL
Qty: 30 TABLET | Refills: 5 | Status: SHIPPED | OUTPATIENT
Start: 2018-02-01 | End: 2018-03-26 | Stop reason: SDUPTHER

## 2018-03-26 ENCOUNTER — OFFICE VISIT (OUTPATIENT)
Dept: INTERNAL MEDICINE | Age: 73
End: 2018-03-26
Payer: COMMERCIAL

## 2018-03-26 VITALS
WEIGHT: 173.4 LBS | DIASTOLIC BLOOD PRESSURE: 69 MMHG | BODY MASS INDEX: 30.72 KG/M2 | HEART RATE: 63 BPM | SYSTOLIC BLOOD PRESSURE: 126 MMHG

## 2018-03-26 DIAGNOSIS — J42 CHRONIC BRONCHITIS, UNSPECIFIED CHRONIC BRONCHITIS TYPE (HCC): ICD-10-CM

## 2018-03-26 DIAGNOSIS — I10 ESSENTIAL HYPERTENSION: Primary | ICD-10-CM

## 2018-03-26 DIAGNOSIS — Z23 NEED FOR PROPHYLACTIC VACCINATION AND INOCULATION AGAINST VARICELLA: ICD-10-CM

## 2018-03-26 DIAGNOSIS — R73.03 PREDIABETES: ICD-10-CM

## 2018-03-26 DIAGNOSIS — N18.30 STAGE 3 CHRONIC KIDNEY DISEASE (HCC): ICD-10-CM

## 2018-03-26 DIAGNOSIS — E78.2 MIXED HYPERLIPIDEMIA: ICD-10-CM

## 2018-03-26 LAB — HBA1C MFR BLD: 5.8 %

## 2018-03-26 PROCEDURE — 99214 OFFICE O/P EST MOD 30 MIN: CPT | Performed by: STUDENT IN AN ORGANIZED HEALTH CARE EDUCATION/TRAINING PROGRAM

## 2018-03-26 PROCEDURE — 83036 HEMOGLOBIN GLYCOSYLATED A1C: CPT | Performed by: STUDENT IN AN ORGANIZED HEALTH CARE EDUCATION/TRAINING PROGRAM

## 2018-03-26 RX ORDER — ATORVASTATIN CALCIUM 40 MG/1
TABLET, FILM COATED ORAL
Qty: 30 TABLET | Refills: 2 | Status: SHIPPED | OUTPATIENT
Start: 2018-03-26 | End: 2018-07-17 | Stop reason: SDUPTHER

## 2018-03-26 RX ORDER — VALSARTAN AND HYDROCHLOROTHIAZIDE 160; 25 MG/1; MG/1
1 TABLET ORAL DAILY
Qty: 30 TABLET | Refills: 2 | Status: SHIPPED | OUTPATIENT
Start: 2018-03-26 | End: 2018-07-17 | Stop reason: SDUPTHER

## 2018-03-26 RX ORDER — ALBUTEROL SULFATE 90 UG/1
2 AEROSOL, METERED RESPIRATORY (INHALATION) EVERY 6 HOURS PRN
Qty: 2 INHALER | Refills: 2 | Status: SHIPPED | OUTPATIENT
Start: 2018-03-26 | End: 2018-07-17 | Stop reason: SDUPTHER

## 2018-03-26 NOTE — PROGRESS NOTES
refilling her spirivA. She has stopped taking lisinopril completely. She reports that her breathing issues are stable. She does not recall his any nighttime dyspnea/wheezing, and reports improved shortness of breath. We started her on Spiriva on her last visit. Her CKD STage 3, is currently stable. She had a BMP on her last visit with us in 12/17, that showed creatinine of 1.15. The creatinine before that on 6/29/17 was 1.15. She had a lipid panel done on 12/14/17. And on her lipids are within normal limits, total cholesterol was 152, LDL was 51, HDL was 72, triglycerides 145. Patient Active Problem List   Diagnosis    Essential hypertension    Mitral valve prolapse    Stage 3 chronic kidney disease    Mixed hyperlipidemia    Chronic bronchitis ([facility] [address]       Health Maintenance Due   Topic Date Due    Shingles Vaccine (1 of 2 - 2 Dose Series) 05/13/1997    A1C test (Diabetic or Prediabetic)  07/01/2016    Flu vaccine (1) 09/01/2017       Allergies   Allergen Reactions    Aspirin Anaphylaxis    Penicillins     Iv Dye [Iodides] Hives         Current Outpatient Prescriptions   Medication Sig Dispense Refill    atorvastatin (LIPITOR) 40 MG tablet take 1 tablet by mouth once daily 30 tablet 5    amLODIPine (NORVASC) 2.5 MG tablet take 1 tablet by mouth once daily 30 tablet 3    valsartan-hydrochlorothiazide (DIOVAN HCT) 160-25 MG per tablet Take 1 tablet by mouth daily DC Lisinopril-HCTZ 30 tablet 2    albuterol sulfate HFA (PROAIR HFA) 108 (90 BASE) MCG/ACT inhaler Inhale 2 puffs into the lungs every 6 hours as needed for Wheezing 1 Inhaler 5     No current facility-administered medications for this visit. Social History   Substance Use Topics    Smoking status: Former Smoker     Packs/day: 0.20     Types: Cigarettes     Quit date: 6/30/2014    Smokeless tobacco: Never Used    Alcohol use 0.6 oz/week     1 Cans of beer per week       No family history on file. CREATININE 1.15 12/19/2017    GLUCOSE 76 12/19/2017    GLUCOSE 81 05/25/2012       HEMOGLOBIN A1C:   Lab Results   Component Value Date    LABA1C 5.7 07/01/2015       FASTING LIPID PANEL:  Lab Results   Component Value Date    CHOL 152 12/14/2017    HDL 72 12/14/2017    TRIG 145 12/14/2017     ________________________________________________________________________  Assessment and Plan:  Dennis Caldwell was seen today for chronic kidney disease, hypertension and health maintenance. Diagnoses and all orders for this visit:    Need for prophylactic vaccination and inoculation against varicella  -     zoster recombinant adjuvanted vaccine (SHINGRIX) 50 MCG SUSR injection; Inject 0.5 mLs into the muscle once for 1 dose    Essential hypertension  -   Chronic, stable. valsartan-hydrochlorothiazide (DIOVAN HCT) 160-25 MG per tablet; Take 1 tablet by mouth daily DC Lisinopril-HCTZ    Stage 3 chronic kidney disease  -   Chronic, stable. 1.15 on her last visit, 3 months ago. Basic Metabolic Panel today; Future    Mixed hyperlipidemia  -   Chronic stable. atorvastatin (LIPITOR) 40 MG tablet; take 1 tablet by mouth once daily    Chronic bronchitis, unspecified chronic bronchitis type (HCC)  -     albuterol sulfate HFA (PROAIR HFA) 108 (90 Base) MCG/ACT inhaler; Inhale 2 puffs into the lungs every 6 hours as needed for Wheezing  -     FULL PFT STUDY WITH PRE AND POST; Future  -     Seems like she was not using atrovent. We will discontinue her Spiriva/ atrovent as not covered by insurance and start her on aclidinium (TUDORZA PRESSAIR) 400 MCG/ACT AEPB inhaler; Inhale 1 puff into the lungs 2 times daily    Chronic obstructive pulmonary disease, unspecified COPD type (HCC)  -     FULL PFT STUDY WITH PRE AND POST; Future  -     aclidinium (TUDORZA PRESSAIR) 400 MCG/ACT AEPB inhaler; Inhale 1 puff into the lungs 2 times daily    Pre-diabetes:  -    A1C on this visit is 5.8.  POCT glycosylated hemoglobin (Hb A1C)    Other orders    -

## 2018-03-26 NOTE — PROGRESS NOTES
Attending Physician Statement [facility])  Internal Medicine Clinic Progress Note    I have discussed the care of [facility], including pertinent history and exam findings, with the resident. I have seen and examined the patient and the key elements of all parts of the encounter have been performed by me. I agree with the assessment, plan and orders as documented by the resident.     Brandi Blanco MD, RAFY  Attending Physician, [address] , Internal Medicine Residency Program  [place]  3/26/2018, 11:02 AM

## 2018-03-26 NOTE — PATIENT INSTRUCTIONS
Your referral for Pulmonary Function test was sent to [facility], You may contact the scheduling department to schedule or if you have any questions call [phone]. You have been given a lab order no need to schedule you are not required to fast you may complete labs anytime prior to your next visit. Your script for shingrix has been printed and given to you, you can take it to the pharmacy of your choice. Avs was given and reviewed appt card given with next appt.      MS

## 2018-04-03 ENCOUNTER — HOSPITAL ENCOUNTER (OUTPATIENT)
Dept: PULMONOLOGY | Age: 73
Discharge: HOME | End: 2018-04-03
Payer: COMMERCIAL

## 2018-04-03 DIAGNOSIS — J42 CHRONIC BRONCHITIS, UNSPECIFIED CHRONIC BRONCHITIS TYPE (HCC): ICD-10-CM

## 2018-04-03 PROCEDURE — 99406 BEHAV CHNG SMOKING 3-10 MIN: CPT

## 2018-04-03 PROCEDURE — 94760 N-INVAS EAR/PLS OXIMETRY 1: CPT

## 2018-04-03 PROCEDURE — 94726 PLETHYSMOGRAPHY LUNG VOLUMES: CPT

## 2018-04-03 PROCEDURE — 94729 DIFFUSING CAPACITY: CPT

## 2018-04-03 PROCEDURE — 94664 DEMO&/EVAL PT USE INHALER: CPT

## 2018-04-03 PROCEDURE — 88738 HGB QUANT TRANSCUTANEOUS: CPT

## 2018-04-03 PROCEDURE — 94640 AIRWAY INHALATION TREATMENT: CPT

## 2018-04-03 PROCEDURE — 94060 EVALUATION OF WHEEZING: CPT

## 2018-04-07 DIAGNOSIS — I10 ESSENTIAL HYPERTENSION: ICD-10-CM

## 2018-04-10 ENCOUNTER — TELEPHONE (OUTPATIENT)
Dept: INTERNAL MEDICINE | Age: 73
End: 2018-04-10

## 2018-04-10 DIAGNOSIS — J42 CHRONIC BRONCHITIS, UNSPECIFIED CHRONIC BRONCHITIS TYPE (HCC): ICD-10-CM

## 2018-04-11 RX ORDER — AMLODIPINE BESYLATE 2.5 MG/1
TABLET ORAL
Qty: 30 TABLET | Refills: 3 | Status: SHIPPED | OUTPATIENT
Start: 2018-04-11 | End: 2018-07-17 | Stop reason: SDUPTHER

## 2018-04-11 RX ORDER — BUDESONIDE AND FORMOTEROL FUMARATE DIHYDRATE 160; 4.5 UG/1; UG/1
2 AEROSOL RESPIRATORY (INHALATION) 2 TIMES DAILY
Qty: 1 INHALER | Refills: 3 | Status: SHIPPED | OUTPATIENT
Start: 2018-04-11 | End: 2018-07-17 | Stop reason: SDUPTHER

## 2018-06-04 ENCOUNTER — OFFICE VISIT (OUTPATIENT)
Dept: INTERNAL MEDICINE | Age: 73
End: 2018-06-04
Payer: COMMERCIAL

## 2018-06-04 VITALS
BODY MASS INDEX: 31.18 KG/M2 | HEIGHT: 63 IN | SYSTOLIC BLOOD PRESSURE: 130 MMHG | HEART RATE: 61 BPM | DIASTOLIC BLOOD PRESSURE: 74 MMHG | WEIGHT: 176 LBS

## 2018-06-04 DIAGNOSIS — J30.89 ENVIRONMENTAL AND SEASONAL ALLERGIES: ICD-10-CM

## 2018-06-04 DIAGNOSIS — J30.9 ALLERGIC RHINOCONJUNCTIVITIS OF BOTH EYES: ICD-10-CM

## 2018-06-04 DIAGNOSIS — H10.13 ALLERGIC RHINOCONJUNCTIVITIS OF BOTH EYES: ICD-10-CM

## 2018-06-04 DIAGNOSIS — J43.9 PULMONARY EMPHYSEMA, UNSPECIFIED EMPHYSEMA TYPE (HCC): ICD-10-CM

## 2018-06-04 DIAGNOSIS — I10 ESSENTIAL HYPERTENSION: ICD-10-CM

## 2018-06-04 DIAGNOSIS — J44.9 CHRONIC OBSTRUCTIVE PULMONARY DISEASE, UNSPECIFIED COPD TYPE (HCC): ICD-10-CM

## 2018-06-04 DIAGNOSIS — N18.30 STAGE 3 CHRONIC KIDNEY DISEASE (HCC): ICD-10-CM

## 2018-06-04 DIAGNOSIS — L98.9 SKIN LESION OF BACK: Primary | ICD-10-CM

## 2018-06-04 DIAGNOSIS — E78.2 MIXED HYPERLIPIDEMIA: ICD-10-CM

## 2018-06-04 PROCEDURE — 99213 OFFICE O/P EST LOW 20 MIN: CPT | Performed by: INTERNAL MEDICINE

## 2018-06-04 PROCEDURE — 99213 OFFICE O/P EST LOW 20 MIN: CPT | Performed by: STUDENT IN AN ORGANIZED HEALTH CARE EDUCATION/TRAINING PROGRAM

## 2018-06-04 RX ORDER — ECHINACEA PURPUREA EXTRACT 125 MG
1 TABLET ORAL PRN
Qty: 1 BOTTLE | Refills: 3 | Status: SHIPPED | OUTPATIENT
Start: 2018-06-04 | End: 2018-10-19 | Stop reason: SDUPTHER

## 2018-06-04 RX ORDER — CROMOLYN SODIUM 40 MG/ML
1 SOLUTION/ DROPS OPHTHALMIC 4 TIMES DAILY
Qty: 1 BOTTLE | Refills: 2 | Status: SHIPPED | OUTPATIENT
Start: 2018-06-04 | End: 2018-10-19 | Stop reason: SDUPTHER

## 2018-06-04 RX ORDER — FLUTICASONE PROPIONATE 50 MCG
1 SPRAY, SUSPENSION (ML) NASAL DAILY
Qty: 1 BOTTLE | Refills: 3 | Status: SHIPPED | OUTPATIENT
Start: 2018-06-04 | End: 2018-10-19 | Stop reason: SDUPTHER

## 2018-06-04 RX ORDER — FEXOFENADINE HYDROCHLORIDE 60 MG/1
60 TABLET, FILM COATED ORAL DAILY
Qty: 30 TABLET | Refills: 2 | Status: SHIPPED | OUTPATIENT
Start: 2018-06-04 | End: 2018-10-19 | Stop reason: SDUPTHER

## 2018-06-04 RX ORDER — TRIAMCINOLONE ACETONIDE 1 MG/G
CREAM TOPICAL
Qty: 1 TUBE | Refills: 1 | Status: ON HOLD | OUTPATIENT
Start: 2018-06-04 | End: 2019-04-13

## 2018-07-17 ENCOUNTER — OFFICE VISIT (OUTPATIENT)
Dept: INTERNAL MEDICINE | Age: 73
End: 2018-07-17
Payer: COMMERCIAL

## 2018-07-17 VITALS
BODY MASS INDEX: 31.18 KG/M2 | SYSTOLIC BLOOD PRESSURE: 135 MMHG | DIASTOLIC BLOOD PRESSURE: 88 MMHG | OXYGEN SATURATION: 97 % | HEIGHT: 63 IN | HEART RATE: 56 BPM | WEIGHT: 176 LBS

## 2018-07-17 DIAGNOSIS — N18.30 STAGE 3 CHRONIC KIDNEY DISEASE (HCC): ICD-10-CM

## 2018-07-17 DIAGNOSIS — Z72.0 SMOKING TRYING TO QUIT: ICD-10-CM

## 2018-07-17 DIAGNOSIS — I10 ESSENTIAL HYPERTENSION: Primary | ICD-10-CM

## 2018-07-17 DIAGNOSIS — Z23 NEED FOR PROPHYLACTIC VACCINATION AND INOCULATION AGAINST VARICELLA: ICD-10-CM

## 2018-07-17 DIAGNOSIS — J42 CHRONIC BRONCHITIS, UNSPECIFIED CHRONIC BRONCHITIS TYPE (HCC): ICD-10-CM

## 2018-07-17 DIAGNOSIS — R73.02 IGT (IMPAIRED GLUCOSE TOLERANCE): ICD-10-CM

## 2018-07-17 DIAGNOSIS — E78.2 MIXED HYPERLIPIDEMIA: ICD-10-CM

## 2018-07-17 LAB — HBA1C MFR BLD: 5.6 %

## 2018-07-17 PROCEDURE — 83036 HEMOGLOBIN GLYCOSYLATED A1C: CPT | Performed by: STUDENT IN AN ORGANIZED HEALTH CARE EDUCATION/TRAINING PROGRAM

## 2018-07-17 PROCEDURE — 99214 OFFICE O/P EST MOD 30 MIN: CPT | Performed by: STUDENT IN AN ORGANIZED HEALTH CARE EDUCATION/TRAINING PROGRAM

## 2018-07-17 PROCEDURE — 99214 OFFICE O/P EST MOD 30 MIN: CPT | Performed by: INTERNAL MEDICINE

## 2018-07-17 RX ORDER — BUDESONIDE AND FORMOTEROL FUMARATE DIHYDRATE 160; 4.5 UG/1; UG/1
2 AEROSOL RESPIRATORY (INHALATION) 2 TIMES DAILY
Qty: 1 INHALER | Refills: 3 | Status: SHIPPED | OUTPATIENT
Start: 2018-07-17 | End: 2018-10-19 | Stop reason: SDUPTHER

## 2018-07-17 RX ORDER — ALBUTEROL SULFATE 90 UG/1
2 AEROSOL, METERED RESPIRATORY (INHALATION) EVERY 6 HOURS PRN
Qty: 2 INHALER | Refills: 2 | Status: SHIPPED | OUTPATIENT
Start: 2018-07-17 | End: 2018-10-19 | Stop reason: SDUPTHER

## 2018-07-17 RX ORDER — VALSARTAN AND HYDROCHLOROTHIAZIDE 160; 25 MG/1; MG/1
1 TABLET ORAL DAILY
Qty: 30 TABLET | Refills: 2 | Status: SHIPPED | OUTPATIENT
Start: 2018-07-17 | End: 2018-10-19 | Stop reason: SDUPTHER

## 2018-07-17 RX ORDER — ATORVASTATIN CALCIUM 40 MG/1
TABLET, FILM COATED ORAL
Qty: 30 TABLET | Refills: 2 | Status: SHIPPED | OUTPATIENT
Start: 2018-07-17 | End: 2018-10-19 | Stop reason: SDUPTHER

## 2018-07-17 RX ORDER — AMLODIPINE BESYLATE 5 MG/1
TABLET ORAL
Qty: 30 TABLET | Refills: 3 | Status: SHIPPED | OUTPATIENT
Start: 2018-07-17 | End: 2018-10-19 | Stop reason: SDUPTHER

## 2018-07-17 NOTE — PROGRESS NOTES
Visit Information    Have you changed or started any medications since your last visit including any over-the-counter medicines, vitamins, or herbal medicines? no   Have you stopped taking any of your medications? Is so, why? -  no  Are you having any side effects from any of your medications? - no    Have you seen any other physician or provider since your last visit?  no   Have you had any other diagnostic tests since your last visit?  no   Have you been seen in the emergency room and/or had an admission in a hospital since we last saw you?  no   Have you had your routine dental cleaning in the past 6 months?  no     Do you have an active MyChart account? If no, what is the barrier?   Yes    Patient Care Team:  Stephani Powers MD as PCP - General (Internal Medicine)  Ksenia Gonzales MD as PCP - [facility] Attributed Provider    Medical History Review  Past Medical, Family, and Social History reviewed and does contribute to the patient presenting condition    Health Maintenance   Topic Date Due    Shingles Vaccine (1 of 2 - 2 Dose Series) 05/12/1997    Flu vaccine (1) 09/01/2018    Breast cancer screen  11/14/2018    Potassium monitoring  12/19/2018    Creatinine monitoring  12/19/2018    A1C test (Diabetic or Prediabetic)  03/26/2019    Colon cancer screen colonoscopy  11/27/2022    Lipid screen  12/14/2022    DTaP/Tdap/Td vaccine (2 - Td) 05/25/2027    DEXA (modify frequency per FRAX score)  Completed    Pneumococcal low/med risk  Completed    Hepatitis C screen  Completed

## 2018-07-17 NOTE — PROGRESS NOTES
medications, allergies, past medical, surgical, social and family histories were reviewed and updated as appropriate. Patient's allergies, medications, past medical, surgical, social and family histories were reviewed and updated as appropriate. ALLERGIES      Allergies   Allergen Reactions    Aspirin Anaphylaxis    Penicillins     Iv Dye [Iodides] Hives         MEDICATIONS:      Current Outpatient Prescriptions   Medication Sig Dispense Refill    zoster recombinant adjuvanted vaccine (SHINGRIX) 50 MCG SUSR injection 50 MCG IM then repeat 2-6 months. 0.5 mL 1    nicotine polacrilex (NICORETTE) 2 MG gum Take 1 each by mouth 4 times daily as needed for Smoking cessation 110 each 3    valsartan-hydrochlorothiazide (DIOVAN HCT) 160-25 MG per tablet Take 1 tablet by mouth daily DC Lisinopril-HCTZ 30 tablet 2    albuterol sulfate HFA (PROAIR HFA) 108 (90 Base) MCG/ACT inhaler Inhale 2 puffs into the lungs every 6 hours as needed for Wheezing 2 Inhaler 2    atorvastatin (LIPITOR) 40 MG tablet take 1 tablet by mouth once daily 30 tablet 2    budesonide-formoterol (SYMBICORT) 160-4.5 MCG/ACT AERO Inhale 2 puffs into the lungs 2 times daily 1 Inhaler 3    amLODIPine (NORVASC) 5 MG tablet take 1 tablet by mouth once daily 30 tablet 3    fluticasone (FLONASE) 50 MCG/ACT nasal spray 1 spray by Nasal route daily 1 Bottle 3    fexofenadine (ALLEGRA ALLERGY) 60 MG tablet Take 1 tablet by mouth daily 30 tablet 2    cromolyn (OPTICROM) 4 % ophthalmic solution Place 1 drop into both eyes 4 times daily 1 Bottle 2    sodium chloride (ALTAMIST SPRAY) 0.65 % nasal spray 1 spray by Nasal route as needed for Congestion 1 Bottle 3    triamcinolone (KENALOG) 0.1 % cream Apply topically 2 times daily. 1 Tube 1     No current facility-administered medications for this visit.         Patient Care Team:  Tayla Morales MD as PCP - General (Internal Medicine)  Yanelis Bearden MD as PCP - S Attributed Provider    PAST 130/74   03/26/18 126/69       Physical Examination: General appearance - alert, well appearing, and in no distress  Eyes - pupils equal and reactive, extraocular eye movements intact  Chest - clear to auscultation, no wheezes, rales or rhonchi, decreased air entry B/L. Heart - normal rate, regular rhythm, normal S1, S2, no murmurs, rubs, clicks or gallops  Abdomen - soft, nontender, nondistended, no masses or organomegaly  Extremities - peripheral pulses normal, no pedal edema, no clubbing or cyanosis    LABORATORY FINDINGS:    CBC:   Lab Results   Component Value Date    WBC 9.0 12/02/2014    HGB 13.1 12/02/2014     12/02/2014     05/25/2012     BMP:    Lab Results   Component Value Date     12/19/2017    K 3.6 12/19/2017     12/19/2017    CO2 26 12/19/2017    BUN 19 12/19/2017    CREATININE 1.15 12/19/2017    GLUCOSE 76 12/19/2017    GLUCOSE 81 05/25/2012     Hemoglobin A1C:   Lab Results   Component Value Date    LABA1C 5.6 07/17/2018     Microalbumin Urine: No results found for: MICROALBUR  Lipid profile:   Lab Results   Component Value Date    CHOL 152 12/14/2017    TRIG 145 12/14/2017    HDL 72 12/14/2017     Thyroid functions:   Lab Results   Component Value Date    TSH 1.06 05/25/2012      Hepatic functions:   Lab Results   Component Value Date    ALT 36 06/29/2017    AST 66 06/29/2017    PROT 7.4 06/29/2017    BILITOT 0.52 06/29/2017    BILIDIR 0.19 06/29/2017    LABALBU 4.3 06/29/2017    LABALBU 4.5 05/25/2012     Urine Analysis: No results found for: [id number] Sixto:      Health Maintenance Due   Topic Date Due    Shingles Vaccine (1 of 2 - 2 Dose Series) 05/12/1997       ASSESSMENT AND PLAN:    1. Essential hypertension  - valsartan-hydrochlorothiazide (DIOVAN HCT) 160-25 MG per tablet; Take 1 tablet by mouth daily DC Lisinopril-HCTZ  Dispense: 30 tablet;  Refill: 2  - amLODIPine (NORVASC) 5 MG tablet; take 1 tablet by mouth once daily  Dispense: 30 tablet; cessation     Dispense:  110 each     Refill:  3    valsartan-hydrochlorothiazide (DIOVAN HCT) 160-25 MG per tablet     Sig: Take 1 tablet by mouth daily DC Lisinopril-HCTZ     Dispense:  30 tablet     Refill:  2    albuterol sulfate HFA (PROAIR HFA) 108 (90 Base) MCG/ACT inhaler     Sig: Inhale 2 puffs into the lungs every 6 hours as needed for Wheezing     Dispense:  2 Inhaler     Refill:  2    atorvastatin (LIPITOR) 40 MG tablet     Sig: take 1 tablet by mouth once daily     Dispense:  30 tablet     Refill:  2    budesonide-formoterol (SYMBICORT) 160-4.5 MCG/ACT AERO     Sig: Inhale 2 puffs into the lungs 2 times daily     Dispense:  1 Inhaler     Refill:  3    amLODIPine (NORVASC) 5 MG tablet     Sig: take 1 tablet by mouth once daily     Dispense:  30 tablet     Refill:  3          Completed Refills               Requested Prescriptions     Signed Prescriptions Disp Refills    zoster recombinant adjuvanted vaccine (SHINGRIX) 50 MCG SUSR injection 0.5 mL 1     Si MCG IM then repeat 2-6 months.  nicotine polacrilex (NICORETTE) 2 MG gum 110 each 3     Sig: Take 1 each by mouth 4 times daily as needed for Smoking cessation    valsartan-hydrochlorothiazide (DIOVAN HCT) 160-25 MG per tablet 30 tablet 2     Sig: Take 1 tablet by mouth daily DC Lisinopril-HCTZ    albuterol sulfate HFA (PROAIR HFA) 108 (90 Base) MCG/ACT inhaler 2 Inhaler 2     Sig: Inhale 2 puffs into the lungs every 6 hours as needed for Wheezing    atorvastatin (LIPITOR) 40 MG tablet 30 tablet 2     Sig: take 1 tablet by mouth once daily    budesonide-formoterol (SYMBICORT) 160-4.5 MCG/ACT AERO 1 Inhaler 3     Sig: Inhale 2 puffs into the lungs 2 times daily    amLODIPine (NORVASC) 5 MG tablet 30 tablet 3     Sig: take 1 tablet by mouth once daily       5. Patient given educational materials - see patient instructions    6. Was a self-tracking handout given in paper form or via [employer]? If yes, see orders or list here.       Orders

## 2018-07-17 NOTE — PROGRESS NOTES
Patient here for follow-up on hypertension which is improved with current dose. Her systolic is still in the 722 and diastolic in the high 93G. She is on a very low-dose Norvasc and will increase it to 5 mg. She'll continue on her other medications. She has history of smoking and COPD. She is feeling much better with Symbicort. She still smoking and would like like nicotine gum to help her quit completely. She had PFTs which showed only mild  obstructive lung disease. She had a chest x-ray which was normal.  She wants to wait on other tests at this point including the little papule she has on her left upper back. advised to see dermatology. She has CKD 3. She had immune electrophoresis done which was -3 years ago. She has not had renal ultrasound other tests which will be ordered today. Results for POC orders placed in visit on 07/17/18   POCT glycosylated hemoglobin (Hb A1C)   Result Value Ref Range    Hemoglobin A1C 5.6 %       Attending Physician Statement  I have discussed the care of Jose Wesley, including pertinent history and exam findings,  with the resident. I have seen and examined the patient and the key elements of all parts of the encounter have been performed by me. I agree with the assessment, plan and orders as documented by the resident.   (GC Modifier)

## 2018-07-19 ENCOUNTER — HOSPITAL ENCOUNTER (OUTPATIENT)
Dept: ULTRASOUND IMAGING | Age: 73
Discharge: HOME | End: 2018-07-21
Payer: COMMERCIAL

## 2018-07-19 DIAGNOSIS — N18.30 STAGE 3 CHRONIC KIDNEY DISEASE (HCC): ICD-10-CM

## 2018-07-19 PROCEDURE — 76775 US EXAM ABDO BACK WALL LIM: CPT

## 2018-10-01 ENCOUNTER — HOSPITAL ENCOUNTER (OUTPATIENT)
Age: 73
Setting detail: SPECIMEN
Discharge: HOME | End: 2018-10-01
Payer: COMMERCIAL

## 2018-10-01 DIAGNOSIS — N18.30 STAGE 3 CHRONIC KIDNEY DISEASE (HCC): ICD-10-CM

## 2018-10-01 LAB
-: ABNORMAL
ABSOLUTE EOS #: 0.14 K/UL (ref 0–0.44)
ABSOLUTE IMMATURE GRANULOCYTE: 0.03 K/UL (ref 0–0.3)
ABSOLUTE LYMPH #: 2.9 K/UL (ref 1.1–3.7)
ABSOLUTE MONO #: 0.41 K/UL (ref 0.1–1.2)
ALBUMIN SERPL-MCNC: 4.5 G/DL (ref 3.5–5.2)
ALBUMIN/GLOBULIN RATIO: 1.5 (ref 1–2.5)
ALP BLD-CCNC: 84 U/L (ref 35–104)
ALT SERPL-CCNC: 18 U/L (ref 5–33)
AMORPHOUS: ABNORMAL
ANION GAP SERPL CALCULATED.3IONS-SCNC: 15 MMOL/L (ref 9–17)
AST SERPL-CCNC: 20 U/L
BACTERIA: ABNORMAL
BASOPHILS # BLD: 0 % (ref 0–2)
BASOPHILS ABSOLUTE: 0.03 K/UL (ref 0–0.2)
BILIRUB SERPL-MCNC: 0.46 MG/DL (ref 0.3–1.2)
BILIRUBIN URINE: NEGATIVE
BUN BLDV-MCNC: 25 MG/DL (ref 8–23)
BUN/CREAT BLD: ABNORMAL (ref 9–20)
CALCIUM SERPL-MCNC: 10.1 MG/DL (ref 8.6–10.4)
CASTS UA: ABNORMAL /LPF (ref 0–8)
CHLORIDE BLD-SCNC: 99 MMOL/L (ref 98–107)
CO2: 26 MMOL/L (ref 20–31)
COLOR: YELLOW
CREAT SERPL-MCNC: 1.33 MG/DL (ref 0.5–0.9)
CRYSTALS, UA: ABNORMAL /HPF
DIFFERENTIAL TYPE: ABNORMAL
EOSINOPHILS RELATIVE PERCENT: 2 % (ref 1–4)
EPITHELIAL CELLS UA: ABNORMAL /HPF (ref 0–5)
GFR AFRICAN AMERICAN: 48 ML/MIN
GFR NON-AFRICAN AMERICAN: 39 ML/MIN
GFR SERPL CREATININE-BSD FRML MDRD: ABNORMAL ML/MIN/{1.73_M2}
GFR SERPL CREATININE-BSD FRML MDRD: ABNORMAL ML/MIN/{1.73_M2}
GLUCOSE BLD-MCNC: 130 MG/DL (ref 70–99)
GLUCOSE URINE: NEGATIVE
HCT VFR BLD CALC: 43.6 % (ref 36.3–47.1)
HEMOGLOBIN: 13.9 G/DL (ref 11.9–15.1)
IMMATURE GRANULOCYTES: 0 %
KETONES, URINE: ABNORMAL
LEUKOCYTE ESTERASE, URINE: NEGATIVE
LYMPHOCYTES # BLD: 38 % (ref 24–43)
MCH RBC QN AUTO: 29.3 PG (ref 25.2–33.5)
MCHC RBC AUTO-ENTMCNC: 31.9 G/DL (ref 28.4–34.8)
MCV RBC AUTO: 92 FL (ref 82.6–102.9)
MONOCYTES # BLD: 5 % (ref 3–12)
MUCUS: ABNORMAL
NITRITE, URINE: NEGATIVE
NRBC AUTOMATED: 0 PER 100 WBC
OTHER OBSERVATIONS UA: ABNORMAL
PDW BLD-RTO: 15 % (ref 11.8–14.4)
PH UA: 5 (ref 5–8)
PLATELET # BLD: 265 K/UL (ref 138–453)
PLATELET ESTIMATE: ABNORMAL
PMV BLD AUTO: 10 FL (ref 8.1–13.5)
POTASSIUM SERPL-SCNC: 3.7 MMOL/L (ref 3.7–5.3)
PROTEIN UA: NEGATIVE
RBC # BLD: 4.74 M/UL (ref 3.95–5.11)
RBC # BLD: ABNORMAL 10*6/UL
RBC UA: ABNORMAL /HPF (ref 0–4)
RENAL EPITHELIAL, UA: ABNORMAL /HPF
SEG NEUTROPHILS: 55 % (ref 36–65)
SEGMENTED NEUTROPHILS ABSOLUTE COUNT: 4.15 K/UL (ref 1.5–8.1)
SODIUM BLD-SCNC: 140 MMOL/L (ref 135–144)
SPECIFIC GRAVITY UA: 1.02 (ref 1–1.03)
TOTAL PROTEIN: 7.5 G/DL (ref 6.4–8.3)
TRICHOMONAS: ABNORMAL
TURBIDITY: CLEAR
URINE HGB: ABNORMAL
UROBILINOGEN, URINE: NORMAL
WBC # BLD: 7.7 K/UL (ref 3.5–11.3)
WBC # BLD: ABNORMAL 10*3/UL
WBC UA: ABNORMAL /HPF (ref 0–5)
YEAST: ABNORMAL

## 2018-10-01 PROCEDURE — 81001 URINALYSIS AUTO W/SCOPE: CPT

## 2018-10-01 PROCEDURE — 80053 COMPREHEN METABOLIC PANEL: CPT

## 2018-10-01 PROCEDURE — 86038 ANTINUCLEAR ANTIBODIES: CPT

## 2018-10-01 PROCEDURE — 36415 COLL VENOUS BLD VENIPUNCTURE: CPT

## 2018-10-01 PROCEDURE — 85025 COMPLETE CBC W/AUTO DIFF WBC: CPT

## 2018-10-02 LAB — ANTI-NUCLEAR ANTIBODY (ANA): NEGATIVE

## 2018-10-19 ENCOUNTER — OFFICE VISIT (OUTPATIENT)
Dept: INTERNAL MEDICINE | Age: 73
End: 2018-10-19
Payer: COMMERCIAL

## 2018-10-19 VITALS
BODY MASS INDEX: 33.66 KG/M2 | HEART RATE: 50 BPM | HEIGHT: 63 IN | DIASTOLIC BLOOD PRESSURE: 73 MMHG | WEIGHT: 190 LBS | SYSTOLIC BLOOD PRESSURE: 120 MMHG

## 2018-10-19 DIAGNOSIS — J42 CHRONIC BRONCHITIS, UNSPECIFIED CHRONIC BRONCHITIS TYPE (HCC): ICD-10-CM

## 2018-10-19 DIAGNOSIS — N18.30 STAGE 3 CHRONIC KIDNEY DISEASE (HCC): ICD-10-CM

## 2018-10-19 DIAGNOSIS — J30.9 ALLERGIC RHINOCONJUNCTIVITIS OF BOTH EYES: ICD-10-CM

## 2018-10-19 DIAGNOSIS — J30.89 ENVIRONMENTAL AND SEASONAL ALLERGIES: ICD-10-CM

## 2018-10-19 DIAGNOSIS — E78.2 MIXED HYPERLIPIDEMIA: ICD-10-CM

## 2018-10-19 DIAGNOSIS — I10 ESSENTIAL HYPERTENSION: Primary | ICD-10-CM

## 2018-10-19 DIAGNOSIS — H10.13 ALLERGIC RHINOCONJUNCTIVITIS OF BOTH EYES: ICD-10-CM

## 2018-10-19 DIAGNOSIS — R63.5 WEIGHT GAIN: ICD-10-CM

## 2018-10-19 PROCEDURE — G0444 DEPRESSION SCREEN ANNUAL: HCPCS | Performed by: STUDENT IN AN ORGANIZED HEALTH CARE EDUCATION/TRAINING PROGRAM

## 2018-10-19 PROCEDURE — 99211 OFF/OP EST MAY X REQ PHY/QHP: CPT | Performed by: INTERNAL MEDICINE

## 2018-10-19 PROCEDURE — 99213 OFFICE O/P EST LOW 20 MIN: CPT | Performed by: STUDENT IN AN ORGANIZED HEALTH CARE EDUCATION/TRAINING PROGRAM

## 2018-10-19 RX ORDER — ECHINACEA PURPUREA EXTRACT 125 MG
1 TABLET ORAL PRN
Qty: 1 BOTTLE | Refills: 3 | Status: ON HOLD | OUTPATIENT
Start: 2018-10-19 | End: 2019-04-13

## 2018-10-19 RX ORDER — BUDESONIDE AND FORMOTEROL FUMARATE DIHYDRATE 160; 4.5 UG/1; UG/1
2 AEROSOL RESPIRATORY (INHALATION) 2 TIMES DAILY
Qty: 1 INHALER | Refills: 3 | Status: SHIPPED | OUTPATIENT
Start: 2018-10-19

## 2018-10-19 RX ORDER — ATORVASTATIN CALCIUM 40 MG/1
TABLET, FILM COATED ORAL
Qty: 30 TABLET | Refills: 2 | Status: SHIPPED | OUTPATIENT
Start: 2018-10-19 | End: 2019-02-05 | Stop reason: SDUPTHER

## 2018-10-19 RX ORDER — AMLODIPINE BESYLATE 5 MG/1
TABLET ORAL
Qty: 30 TABLET | Refills: 3 | Status: SHIPPED | OUTPATIENT
Start: 2018-10-19 | End: 2019-03-04 | Stop reason: SDUPTHER

## 2018-10-19 RX ORDER — FLUTICASONE PROPIONATE 50 MCG
1 SPRAY, SUSPENSION (ML) NASAL DAILY
Qty: 1 BOTTLE | Refills: 3 | Status: ON HOLD | OUTPATIENT
Start: 2018-10-19 | End: 2019-04-13

## 2018-10-19 RX ORDER — VALSARTAN AND HYDROCHLOROTHIAZIDE 160; 25 MG/1; MG/1
1 TABLET ORAL DAILY
Qty: 30 TABLET | Refills: 2 | Status: SHIPPED | OUTPATIENT
Start: 2018-10-19 | End: 2019-01-22 | Stop reason: SDUPTHER

## 2018-10-19 RX ORDER — ALBUTEROL SULFATE 90 UG/1
2 AEROSOL, METERED RESPIRATORY (INHALATION) EVERY 6 HOURS PRN
Qty: 2 INHALER | Refills: 2 | Status: SHIPPED | OUTPATIENT
Start: 2018-10-19

## 2018-10-19 RX ORDER — FEXOFENADINE HYDROCHLORIDE 60 MG/1
60 TABLET, FILM COATED ORAL DAILY
Qty: 30 TABLET | Refills: 2 | Status: ON HOLD | OUTPATIENT
Start: 2018-10-19 | End: 2019-04-13

## 2018-10-19 RX ORDER — CROMOLYN SODIUM 40 MG/ML
1 SOLUTION/ DROPS OPHTHALMIC 4 TIMES DAILY
Qty: 1 BOTTLE | Refills: 2 | Status: SHIPPED | OUTPATIENT
Start: 2018-10-19

## 2018-10-19 ASSESSMENT — PATIENT HEALTH QUESTIONNAIRE - PHQ9
SUM OF ALL RESPONSES TO PHQ QUESTIONS 1-9: 0
5. POOR APPETITE OR OVEREATING: 0
4. FEELING TIRED OR HAVING LITTLE ENERGY: 0
9. THOUGHTS THAT YOU WOULD BE BETTER OFF DEAD, OR OF HURTING YOURSELF: 0
8. MOVING OR SPEAKING SO SLOWLY THAT OTHER PEOPLE COULD HAVE NOTICED. OR THE OPPOSITE, BEING SO FIGETY OR RESTLESS THAT YOU HAVE BEEN MOVING AROUND A LOT MORE THAN USUAL: 0
SUM OF ALL RESPONSES TO PHQ QUESTIONS 1-9: 0
7. TROUBLE CONCENTRATING ON THINGS, SUCH AS READING THE NEWSPAPER OR WATCHING TELEVISION: 0
10. IF YOU CHECKED OFF ANY PROBLEMS, HOW DIFFICULT HAVE THESE PROBLEMS MADE IT FOR YOU TO DO YOUR WORK, TAKE CARE OF THINGS AT HOME, OR GET ALONG WITH OTHER PEOPLE: 0
SUM OF ALL RESPONSES TO PHQ9 QUESTIONS 1 & 2: 0
2. FEELING DOWN, DEPRESSED OR HOPELESS: 0
1. LITTLE INTEREST OR PLEASURE IN DOING THINGS: 0
6. FEELING BAD ABOUT YOURSELF - OR THAT YOU ARE A FAILURE OR HAVE LET YOURSELF OR YOUR FAMILY DOWN: 0
3. TROUBLE FALLING OR STAYING ASLEEP: 0

## 2018-10-19 NOTE — PROGRESS NOTES
HYPERTENSION visit     BP Readings from Last 3 Encounters:   07/17/18 135/88   06/04/18 130/74   03/26/18 126/69       LDL Cholesterol (mg/dL)   Date Value   12/14/2017 51     HDL (mg/dL)   Date Value   12/14/2017 72     BUN (mg/dL)   Date Value   10/01/2018 25 (H)     CREATININE (mg/dL)   Date Value   10/01/2018 1.33 (H)     Glucose (mg/dL)   Date Value   10/01/2018 130 (H)   05/25/2012 81              Have you changed or started any medications since your last visit including any over-the-counter medicines, vitamins, or herbal medicines? no   Have you stopped taking any of your medications? Is so, why? -  no  Are you having any side effects from any of your medications? - no  How often do you miss doses of your medication? no      Have you seen any other physician or provider since your last visit?  no   Have you had any other diagnostic tests since your last visit?  no   Have you been seen in the emergency room and/or had an admission in a hospital since we last saw you?  no   Have you had your routine dental cleaning in the past 6 months?  no     Do you have an active MyChart account? If no, what is the barrier?   Yes    Patient Care Team:  Nikki De La Cruz MD as PCP - General (Internal Medicine)  Brandi Tejeda MD as PCP - [facility] Attributed Provider    Medical History Review  Past Medical, Family, and Social History reviewed and does not contribute to the patient presenting condition    Health Maintenance   Topic Date Due    Shingles Vaccine (1 of 2 - 2 Dose Series) 05/12/1997    Flu vaccine (1) 09/01/2018    Breast cancer screen  11/14/2018    Potassium monitoring  10/01/2019    Creatinine monitoring  10/01/2019    Colon cancer screen colonoscopy  11/27/2022    Lipid screen  12/14/2022    DTaP/Tdap/Td vaccine (2 - Td) 05/25/2027    DEXA (modify frequency per FRAX score)  Completed    Pneumococcal low/med risk  Completed    Hepatitis C screen  Completed

## 2018-10-19 NOTE — PROGRESS NOTES
Attending Physician Statement  I have discussed the care of Kristian Caraballo, including pertinent history and exam findings with the resident. I have reviewed the key elements of all parts of the encounter with the resident. I agree with the assessment, and status of the problem list as documented. Diagnosis Orders   1. Essential hypertension  valsartan-hydrochlorothiazide (DIOVAN HCT) 160-25 MG per tablet    amLODIPine (NORVASC) 5 MG tablet   2. Weight gain  TSH With Reflex Ft4   3. Stage 3 chronic kidney disease [facility]   4. Chronic bronchitis, unspecified chronic bronchitis type (HCC)  albuterol sulfate HFA (PROAIR HFA) 108 (90 Base) MCG/ACT inhaler    budesonide-formoterol (SYMBICORT) 160-4.5 MCG/ACT AERO   5. Mixed hyperlipidemia  atorvastatin (LIPITOR) 40 MG tablet   6. Environmental and seasonal allergies  fluticasone (FLONASE) 50 MCG/ACT nasal spray    fexofenadine (ALLEGRA ALLERGY) 60 MG tablet   7. Allergic rhinoconjunctivitis of both eyes  cromolyn (OPTICROM) 4 % ophthalmic solution    sodium chloride (ALTAMIST SPRAY) 0.65 % nasal spray     The plan and orders should include   Orders Placed This Encounter   Procedures    TSH With Reflex Live [facility]    and this was also documented by the resident. The medication list was reviewed with the resident and is up to date. The return visit should be in 4 months .     Kathleen Watkins MD, [address] [place] [address] Internal Medicine Associate & [facility]  Associate  Department of Internal Medicine  Internal Medicine Clerkship - [place] Bella  10/19/2018, 11:44 AM

## 2018-10-19 NOTE — PROGRESS NOTES
[facility]    Date of patient's visit: 10/19/2018  Patient's Name:  Phan Adame                   YOB: 1945        PCP:  James Harrington MD    Phan Adame is a 72 y.o. female who presents for   Chief Complaint   Patient presents with    Hypertension    3 Month Follow-Up    HTN:  Controlled on Diovan-HCTZ 160/25 and Norvasc 5 mg daily. Blood pressure this a.m. 120/73, heart rate 50. She reports compliance with all her medications, denies any side effects, including pedal edema, dizziness, headaches. She denies any shortness of breath, dyspnea, orthopnea, chest pain/pressure. She reports improved exercise tolerance. Reports she has quit completely and just been using to Nicorette gums a day, and states that she feels that she's been eating more candies/Dania's, and has gained 15 pounds since last visit, 3 months ago, per chart review. CKD stage III:  Recently had CMP, in a screen, CBC, HbA1c, UA and renal ultrasound done. Urinalysis shows few bacteria, 10-20 epithelial cells, 5-10 hyaline casts, nonpathologic WBC, small urinary hemoglobin and small amount of ketones. A NA screen was negative. CMP showed blood glucose of 1:30, BUN of 25 and creatinine of 1.33, along with reduced GFR of 48. CBC showed normal hemoglobin, normal MCV, largely unremarkable with some anisocytosis. Renal ultrasound showed slightly small size of kidneys, right 7.2 cm, left 8 cm. No hydronephrosis. Tiny cyst in the right kidney. Suspected tiny parenchymal calcification of vascular calcification in the right kidney. No definite evidence for nephrolithiasis. Chronic bronchitis/COPD  States her chronic bronchitis/COPD has been doing well. She denies any nighttime symptoms of shortness of breath, excessive rescue inhaler use. States she is using Symbicort from intense and albuterol for rescue, and has been doing well. Excess tolerance improved.         and follow up

## 2018-12-12 DIAGNOSIS — Z72.0 SMOKING TRYING TO QUIT: ICD-10-CM

## 2018-12-12 RX ORDER — SODIUM CHLORIDE 0.65 %
AEROSOL, SPRAY (ML) NASAL
Qty: 120 EACH | Refills: 1 | Status: SHIPPED | OUTPATIENT
Start: 2018-12-12 | End: 2019-02-05 | Stop reason: SDUPTHER

## 2019-01-22 DIAGNOSIS — I10 ESSENTIAL HYPERTENSION: ICD-10-CM

## 2019-01-22 RX ORDER — VALSARTAN AND HYDROCHLOROTHIAZIDE 160; 25 MG/1; MG/1
TABLET ORAL
Qty: 30 TABLET | Refills: 2 | Status: ON HOLD | OUTPATIENT
Start: 2019-01-22 | End: 2019-04-20 | Stop reason: HOSPADM

## 2019-02-05 ENCOUNTER — OFFICE VISIT (OUTPATIENT)
Dept: INTERNAL MEDICINE | Age: 74
End: 2019-02-05
Payer: COMMERCIAL

## 2019-02-05 VITALS
SYSTOLIC BLOOD PRESSURE: 124 MMHG | HEIGHT: 63 IN | WEIGHT: 185.4 LBS | TEMPERATURE: 97.9 F | BODY MASS INDEX: 32.85 KG/M2 | DIASTOLIC BLOOD PRESSURE: 77 MMHG | HEART RATE: 64 BPM

## 2019-02-05 DIAGNOSIS — I10 ESSENTIAL HYPERTENSION: Primary | ICD-10-CM

## 2019-02-05 DIAGNOSIS — E78.2 MIXED HYPERLIPIDEMIA: ICD-10-CM

## 2019-02-05 DIAGNOSIS — N18.30 CHRONIC KIDNEY DISEASE, STAGE III (MODERATE) (HCC): ICD-10-CM

## 2019-02-05 DIAGNOSIS — N18.30 STAGE 3 CHRONIC KIDNEY DISEASE (HCC): ICD-10-CM

## 2019-02-05 DIAGNOSIS — Z12.39 BREAST CANCER SCREENING: ICD-10-CM

## 2019-02-05 DIAGNOSIS — J42 CHRONIC BRONCHITIS, UNSPECIFIED CHRONIC BRONCHITIS TYPE (HCC): ICD-10-CM

## 2019-02-05 DIAGNOSIS — Z72.0 SMOKING TRYING TO QUIT: ICD-10-CM

## 2019-02-05 PROBLEM — Z87.891 STOPPED SMOKING: Status: ACTIVE | Noted: 2019-02-05

## 2019-02-05 PROCEDURE — 99211 OFF/OP EST MAY X REQ PHY/QHP: CPT | Performed by: INTERNAL MEDICINE

## 2019-02-05 PROCEDURE — 99213 OFFICE O/P EST LOW 20 MIN: CPT | Performed by: STUDENT IN AN ORGANIZED HEALTH CARE EDUCATION/TRAINING PROGRAM

## 2019-02-05 RX ORDER — ATORVASTATIN CALCIUM 40 MG/1
TABLET, FILM COATED ORAL
Qty: 30 TABLET | Refills: 2 | Status: SHIPPED | OUTPATIENT
Start: 2019-02-05 | End: 2019-04-03 | Stop reason: SDUPTHER

## 2019-02-07 DIAGNOSIS — Z12.39 BREAST CANCER SCREENING: Primary | ICD-10-CM

## 2019-02-15 ENCOUNTER — HOSPITAL ENCOUNTER (OUTPATIENT)
Dept: MAMMOGRAPHY | Age: 74
Discharge: HOME | End: 2019-02-17
Payer: COMMERCIAL

## 2019-02-15 DIAGNOSIS — Z12.39 BREAST CANCER SCREENING: ICD-10-CM

## 2019-02-15 PROCEDURE — 77067 SCR MAMMO BI INCL CAD: CPT

## 2019-02-19 ENCOUNTER — APPOINTMENT (OUTPATIENT)
Dept: CT IMAGING | Age: 74
DRG: 375 | End: 2019-02-19
Payer: COMMERCIAL

## 2019-02-19 ENCOUNTER — APPOINTMENT (OUTPATIENT)
Dept: GENERAL RADIOLOGY | Age: 74
DRG: 375 | End: 2019-02-19
Payer: COMMERCIAL

## 2019-02-19 ENCOUNTER — HOSPITAL ENCOUNTER (INPATIENT)
Age: 74
LOS: 7 days | Discharge: HOME | DRG: 375 | End: 2019-02-26
Attending: EMERGENCY MEDICINE | Admitting: INTERNAL MEDICINE
Payer: COMMERCIAL

## 2019-02-19 DIAGNOSIS — R10.84 GENERALIZED ABDOMINAL PAIN: Primary | ICD-10-CM

## 2019-02-19 DIAGNOSIS — K31.89 DUODENAL MASS: ICD-10-CM

## 2019-02-19 PROBLEM — K55.9 ISCHEMIC COLITIS (HCC): Status: ACTIVE | Noted: 2019-02-19

## 2019-02-19 PROBLEM — R10.9 ABDOMINAL PAIN: Status: ACTIVE | Noted: 2019-02-19

## 2019-02-19 LAB
-: ABNORMAL
ABSOLUTE EOS #: 0.36 K/UL (ref 0–0.44)
ABSOLUTE IMMATURE GRANULOCYTE: 0.08 K/UL (ref 0–0.3)
ABSOLUTE LYMPH #: 2.39 K/UL (ref 1.1–3.7)
ABSOLUTE MONO #: 0.93 K/UL (ref 0.1–1.2)
ALBUMIN SERPL-MCNC: 3.4 G/DL (ref 3.5–5.2)
ALBUMIN/GLOBULIN RATIO: 0.9 (ref 1–2.5)
ALP BLD-CCNC: 190 U/L (ref 35–104)
ALT SERPL-CCNC: 20 U/L (ref 5–33)
AMORPHOUS: ABNORMAL
ANION GAP SERPL CALCULATED.3IONS-SCNC: 16 MMOL/L (ref 9–17)
AST SERPL-CCNC: 39 U/L
BACTERIA: ABNORMAL
BASOPHILS # BLD: 0 % (ref 0–2)
BASOPHILS ABSOLUTE: 0.04 K/UL (ref 0–0.2)
BILIRUB SERPL-MCNC: 0.57 MG/DL (ref 0.3–1.2)
BILIRUBIN DIRECT: 0.16 MG/DL
BILIRUBIN URINE: NEGATIVE
BILIRUBIN, INDIRECT: 0.41 MG/DL (ref 0–1)
BUN BLDV-MCNC: 17 MG/DL (ref 8–23)
BUN/CREAT BLD: ABNORMAL (ref 9–20)
CALCIUM SERPL-MCNC: 9.4 MG/DL (ref 8.6–10.4)
CASTS UA: ABNORMAL /LPF (ref 0–8)
CHLORIDE BLD-SCNC: 100 MMOL/L (ref 98–107)
CO2: 23 MMOL/L (ref 20–31)
COLOR: YELLOW
CREAT SERPL-MCNC: 1.03 MG/DL (ref 0.5–0.9)
CRYSTALS, UA: ABNORMAL /HPF
DIFFERENTIAL TYPE: ABNORMAL
EOSINOPHILS RELATIVE PERCENT: 3 % (ref 1–4)
EPITHELIAL CELLS UA: ABNORMAL /HPF (ref 0–5)
GFR AFRICAN AMERICAN: >60 ML/MIN
GFR NON-AFRICAN AMERICAN: 53 ML/MIN
GFR SERPL CREATININE-BSD FRML MDRD: ABNORMAL ML/MIN/{1.73_M2}
GFR SERPL CREATININE-BSD FRML MDRD: ABNORMAL ML/MIN/{1.73_M2}
GLOBULIN: ABNORMAL G/DL (ref 1.5–3.8)
GLUCOSE BLD-MCNC: 107 MG/DL (ref 70–99)
GLUCOSE URINE: NEGATIVE
HCT VFR BLD CALC: 34.8 % (ref 36.3–47.1)
HEMOGLOBIN: 11.2 G/DL (ref 11.9–15.1)
IMMATURE GRANULOCYTES: 1 %
KETONES, URINE: ABNORMAL
LACTIC ACID, SEPSIS WHOLE BLOOD: 2 MMOL/L (ref 0.5–1.9)
LACTIC ACID, SEPSIS: ABNORMAL MMOL/L (ref 0.5–1.9)
LACTIC ACID, WHOLE BLOOD: 1.1 MMOL/L (ref 0.7–2.1)
LACTIC ACID: NORMAL MMOL/L
LEUKOCYTE ESTERASE, URINE: NEGATIVE
LIPASE: 30 U/L (ref 13–60)
LYMPHOCYTES # BLD: 22 % (ref 24–43)
MCH RBC QN AUTO: 28.9 PG (ref 25.2–33.5)
MCHC RBC AUTO-ENTMCNC: 32.2 G/DL (ref 28.4–34.8)
MCV RBC AUTO: 89.7 FL (ref 82.6–102.9)
MONOCYTES # BLD: 8 % (ref 3–12)
MUCUS: ABNORMAL
NITRITE, URINE: NEGATIVE
NRBC AUTOMATED: 0 PER 100 WBC
OTHER OBSERVATIONS UA: ABNORMAL
PDW BLD-RTO: 14.1 % (ref 11.8–14.4)
PH UA: 5.5 (ref 5–8)
PLATELET # BLD: 340 K/UL (ref 138–453)
PLATELET ESTIMATE: ABNORMAL
PMV BLD AUTO: 9.3 FL (ref 8.1–13.5)
POTASSIUM SERPL-SCNC: 3.5 MMOL/L (ref 3.7–5.3)
PROTEIN UA: NEGATIVE
RBC # BLD: 3.88 M/UL (ref 3.95–5.11)
RBC # BLD: ABNORMAL 10*6/UL
RBC UA: ABNORMAL /HPF (ref 0–4)
RENAL EPITHELIAL, UA: ABNORMAL /HPF
SEG NEUTROPHILS: 66 % (ref 36–65)
SEGMENTED NEUTROPHILS ABSOLUTE COUNT: 7.24 K/UL (ref 1.5–8.1)
SODIUM BLD-SCNC: 139 MMOL/L (ref 135–144)
SPECIFIC GRAVITY UA: 1.03 (ref 1–1.03)
TOTAL PROTEIN: 7.3 G/DL (ref 6.4–8.3)
TRICHOMONAS: ABNORMAL
TROPONIN INTERP: NORMAL
TROPONIN T: NORMAL NG/ML
TROPONIN, HIGH SENSITIVITY: 9 NG/L (ref 0–14)
TURBIDITY: CLEAR
URINE HGB: NEGATIVE
UROBILINOGEN, URINE: NORMAL
WBC # BLD: 11 K/UL (ref 3.5–11.3)
WBC # BLD: ABNORMAL 10*3/UL
WBC UA: ABNORMAL /HPF (ref 0–5)
YEAST: ABNORMAL

## 2019-02-19 PROCEDURE — 6360000002 HC RX W HCPCS: Performed by: STUDENT IN AN ORGANIZED HEALTH CARE EDUCATION/TRAINING PROGRAM

## 2019-02-19 PROCEDURE — 94640 AIRWAY INHALATION TREATMENT: CPT

## 2019-02-19 PROCEDURE — 2580000003 HC RX 258: Performed by: STUDENT IN AN ORGANIZED HEALTH CARE EDUCATION/TRAINING PROGRAM

## 2019-02-19 PROCEDURE — 87086 URINE CULTURE/COLONY COUNT: CPT

## 2019-02-19 PROCEDURE — 99285 EMERGENCY DEPT VISIT HI MDM: CPT

## 2019-02-19 PROCEDURE — 74177 CT ABD & PELVIS W/CONTRAST: CPT

## 2019-02-19 PROCEDURE — 6360000002 HC RX W HCPCS: Performed by: HOSPITALIST

## 2019-02-19 PROCEDURE — 6370000000 HC RX 637 (ALT 250 FOR IP): Performed by: HOSPITALIST

## 2019-02-19 PROCEDURE — 6360000004 HC RX CONTRAST MEDICATION: Performed by: INTERNAL MEDICINE

## 2019-02-19 PROCEDURE — 86301 IMMUNOASSAY TUMOR CA 19-9: CPT

## 2019-02-19 PROCEDURE — 85025 COMPLETE CBC W/AUTO DIFF WBC: CPT

## 2019-02-19 PROCEDURE — 71046 X-RAY EXAM CHEST 2 VIEWS: CPT

## 2019-02-19 PROCEDURE — 93005 ELECTROCARDIOGRAM TRACING: CPT

## 2019-02-19 PROCEDURE — 84484 ASSAY OF TROPONIN QUANT: CPT

## 2019-02-19 PROCEDURE — 96374 THER/PROPH/DIAG INJ IV PUSH: CPT

## 2019-02-19 PROCEDURE — 80048 BASIC METABOLIC PNL TOTAL CA: CPT

## 2019-02-19 PROCEDURE — 6360000002 HC RX W HCPCS

## 2019-02-19 PROCEDURE — 82105 ALPHA-FETOPROTEIN SERUM: CPT

## 2019-02-19 PROCEDURE — 96376 TX/PRO/DX INJ SAME DRUG ADON: CPT

## 2019-02-19 PROCEDURE — 96375 TX/PRO/DX INJ NEW DRUG ADDON: CPT

## 2019-02-19 PROCEDURE — 81001 URINALYSIS AUTO W/SCOPE: CPT

## 2019-02-19 PROCEDURE — 83690 ASSAY OF LIPASE: CPT

## 2019-02-19 PROCEDURE — 36415 COLL VENOUS BLD VENIPUNCTURE: CPT

## 2019-02-19 PROCEDURE — 2580000003 HC RX 258: Performed by: HOSPITALIST

## 2019-02-19 PROCEDURE — 83605 ASSAY OF LACTIC ACID: CPT

## 2019-02-19 PROCEDURE — 1200000000 HC SEMI PRIVATE

## 2019-02-19 PROCEDURE — 80076 HEPATIC FUNCTION PANEL: CPT

## 2019-02-19 PROCEDURE — 82378 CARCINOEMBRYONIC ANTIGEN: CPT

## 2019-02-19 PROCEDURE — 99223 1ST HOSP IP/OBS HIGH 75: CPT | Performed by: INTERNAL MEDICINE

## 2019-02-19 RX ORDER — AMLODIPINE BESYLATE 5 MG/1
5 TABLET ORAL DAILY
Status: DISCONTINUED | OUTPATIENT
Start: 2019-02-19 | End: 2019-02-26 | Stop reason: HOSPADM

## 2019-02-19 RX ORDER — SODIUM CHLORIDE 0.9 % (FLUSH) 0.9 %
10 SYRINGE (ML) INJECTION EVERY 12 HOURS SCHEDULED
Status: DISCONTINUED | OUTPATIENT
Start: 2019-02-19 | End: 2019-02-26 | Stop reason: HOSPADM

## 2019-02-19 RX ORDER — ATORVASTATIN CALCIUM 40 MG/1
40 TABLET, FILM COATED ORAL NIGHTLY
Status: DISCONTINUED | OUTPATIENT
Start: 2019-02-19 | End: 2019-02-26 | Stop reason: HOSPADM

## 2019-02-19 RX ORDER — SODIUM CHLORIDE 0.9 % (FLUSH) 0.9 %
10 SYRINGE (ML) INJECTION PRN
Status: DISCONTINUED | OUTPATIENT
Start: 2019-02-19 | End: 2019-02-26 | Stop reason: HOSPADM

## 2019-02-19 RX ORDER — MORPHINE SULFATE 2 MG/ML
1 INJECTION, SOLUTION INTRAMUSCULAR; INTRAVENOUS
Status: DISCONTINUED | OUTPATIENT
Start: 2019-02-19 | End: 2019-02-20

## 2019-02-19 RX ORDER — 0.9 % SODIUM CHLORIDE 0.9 %
500 INTRAVENOUS SOLUTION INTRAVENOUS ONCE
Status: COMPLETED | OUTPATIENT
Start: 2019-02-19 | End: 2019-02-19

## 2019-02-19 RX ORDER — METHYLPREDNISOLONE SODIUM SUCCINATE 40 MG/ML
40 INJECTION, POWDER, LYOPHILIZED, FOR SOLUTION INTRAMUSCULAR; INTRAVENOUS ONCE
Status: COMPLETED | OUTPATIENT
Start: 2019-02-19 | End: 2019-02-19

## 2019-02-19 RX ORDER — ALBUTEROL SULFATE 90 UG/1
2 AEROSOL, METERED RESPIRATORY (INHALATION) EVERY 6 HOURS PRN
Status: DISCONTINUED | OUTPATIENT
Start: 2019-02-19 | End: 2019-02-26 | Stop reason: HOSPADM

## 2019-02-19 RX ORDER — ACETAMINOPHEN 325 MG/1
650 TABLET ORAL EVERY 4 HOURS PRN
Status: CANCELLED | OUTPATIENT
Start: 2019-02-19

## 2019-02-19 RX ORDER — POTASSIUM CHLORIDE 20 MEQ/1
40 TABLET, EXTENDED RELEASE ORAL PRN
Status: DISCONTINUED | OUTPATIENT
Start: 2019-02-19 | End: 2019-02-26 | Stop reason: HOSPADM

## 2019-02-19 RX ORDER — POTASSIUM CHLORIDE 7.45 MG/ML
10 INJECTION INTRAVENOUS PRN
Status: DISCONTINUED | OUTPATIENT
Start: 2019-02-19 | End: 2019-02-26 | Stop reason: HOSPADM

## 2019-02-19 RX ORDER — FENTANYL CITRATE 50 UG/ML
50 INJECTION, SOLUTION INTRAMUSCULAR; INTRAVENOUS ONCE
Status: COMPLETED | OUTPATIENT
Start: 2019-02-19 | End: 2019-02-19

## 2019-02-19 RX ORDER — DIPHENHYDRAMINE HYDROCHLORIDE 50 MG/ML
50 INJECTION INTRAMUSCULAR; INTRAVENOUS ONCE
Status: CANCELLED | OUTPATIENT
Start: 2019-02-19 | End: 2019-02-19

## 2019-02-19 RX ORDER — FENTANYL CITRATE 50 UG/ML
25 INJECTION, SOLUTION INTRAMUSCULAR; INTRAVENOUS ONCE
Status: COMPLETED | OUTPATIENT
Start: 2019-02-19 | End: 2019-02-19

## 2019-02-19 RX ORDER — ONDANSETRON 2 MG/ML
4 INJECTION INTRAMUSCULAR; INTRAVENOUS EVERY 6 HOURS PRN
Status: DISCONTINUED | OUTPATIENT
Start: 2019-02-19 | End: 2019-02-26 | Stop reason: HOSPADM

## 2019-02-19 RX ORDER — MORPHINE SULFATE 2 MG/ML
2 INJECTION, SOLUTION INTRAMUSCULAR; INTRAVENOUS
Status: DISCONTINUED | OUTPATIENT
Start: 2019-02-19 | End: 2019-02-20

## 2019-02-19 RX ORDER — SODIUM CHLORIDE 0.9 % (FLUSH) 0.9 %
10 SYRINGE (ML) INJECTION EVERY 12 HOURS SCHEDULED
Status: CANCELLED | OUTPATIENT
Start: 2019-02-19

## 2019-02-19 RX ORDER — POTASSIUM CHLORIDE 20MEQ/15ML
40 LIQUID (ML) ORAL PRN
Status: DISCONTINUED | OUTPATIENT
Start: 2019-02-19 | End: 2019-02-26 | Stop reason: HOSPADM

## 2019-02-19 RX ORDER — SODIUM CHLORIDE 9 MG/ML
INJECTION, SOLUTION INTRAVENOUS CONTINUOUS
Status: DISCONTINUED | OUTPATIENT
Start: 2019-02-19 | End: 2019-02-24

## 2019-02-19 RX ORDER — DIPHENHYDRAMINE HYDROCHLORIDE 50 MG/ML
INJECTION INTRAMUSCULAR; INTRAVENOUS
Status: COMPLETED
Start: 2019-02-19 | End: 2019-02-19

## 2019-02-19 RX ORDER — ONDANSETRON 2 MG/ML
4 INJECTION INTRAMUSCULAR; INTRAVENOUS ONCE
Status: COMPLETED | OUTPATIENT
Start: 2019-02-19 | End: 2019-02-19

## 2019-02-19 RX ORDER — MAGNESIUM SULFATE 1 G/100ML
1 INJECTION INTRAVENOUS PRN
Status: DISCONTINUED | OUTPATIENT
Start: 2019-02-19 | End: 2019-02-26 | Stop reason: HOSPADM

## 2019-02-19 RX ORDER — ECHINACEA PURPUREA EXTRACT 125 MG
1 TABLET ORAL PRN
Status: DISCONTINUED | OUTPATIENT
Start: 2019-02-19 | End: 2019-02-26 | Stop reason: HOSPADM

## 2019-02-19 RX ORDER — SODIUM CHLORIDE 0.9 % (FLUSH) 0.9 %
10 SYRINGE (ML) INJECTION PRN
Status: CANCELLED | OUTPATIENT
Start: 2019-02-19

## 2019-02-19 RX ORDER — DIPHENHYDRAMINE HYDROCHLORIDE 50 MG/ML
50 INJECTION INTRAMUSCULAR; INTRAVENOUS ONCE
Status: DISCONTINUED | OUTPATIENT
Start: 2019-02-19 | End: 2019-02-26 | Stop reason: HOSPADM

## 2019-02-19 RX ORDER — CROMOLYN SODIUM 40 MG/ML
1 SOLUTION/ DROPS OPHTHALMIC 4 TIMES DAILY
Status: DISCONTINUED | OUTPATIENT
Start: 2019-02-19 | End: 2019-02-26 | Stop reason: HOSPADM

## 2019-02-19 RX ORDER — METHYLPREDNISOLONE SODIUM SUCCINATE 125 MG/2ML
INJECTION, POWDER, LYOPHILIZED, FOR SOLUTION INTRAMUSCULAR; INTRAVENOUS
Status: DISPENSED
Start: 2019-02-19 | End: 2019-02-20

## 2019-02-19 RX ADMIN — SODIUM CHLORIDE 500 ML: 9 INJECTION, SOLUTION INTRAVENOUS at 15:03

## 2019-02-19 RX ADMIN — ENOXAPARIN SODIUM 40 MG: 40 INJECTION SUBCUTANEOUS at 22:11

## 2019-02-19 RX ADMIN — SODIUM CHLORIDE: 9 INJECTION, SOLUTION INTRAVENOUS at 19:10

## 2019-02-19 RX ADMIN — CROMOLYN SODIUM 1 DROP: 40 SOLUTION/ DROPS OPHTHALMIC at 22:11

## 2019-02-19 RX ADMIN — IOPAMIDOL 75 ML: 755 INJECTION, SOLUTION INTRAVENOUS at 18:51

## 2019-02-19 RX ADMIN — MOMETASONE FUROATE AND FORMOTEROL FUMARATE DIHYDRATE 2 PUFF: 200; 5 AEROSOL RESPIRATORY (INHALATION) at 19:57

## 2019-02-19 RX ADMIN — METHYLPREDNISOLONE SODIUM SUCCINATE 40 MG: 125 INJECTION, POWDER, FOR SOLUTION INTRAMUSCULAR; INTRAVENOUS at 18:00

## 2019-02-19 RX ADMIN — DIPHENHYDRAMINE HYDROCHLORIDE 50 MG: 50 INJECTION INTRAMUSCULAR; INTRAVENOUS at 16:50

## 2019-02-19 RX ADMIN — MORPHINE SULFATE 2 MG: 2 INJECTION, SOLUTION INTRAMUSCULAR; INTRAVENOUS at 23:21

## 2019-02-19 RX ADMIN — FENTANYL CITRATE 25 MCG: 50 INJECTION, SOLUTION INTRAMUSCULAR; INTRAVENOUS at 13:17

## 2019-02-19 RX ADMIN — FENTANYL CITRATE 50 MCG: 50 INJECTION, SOLUTION INTRAMUSCULAR; INTRAVENOUS at 15:02

## 2019-02-19 RX ADMIN — ONDANSETRON 4 MG: 2 INJECTION INTRAMUSCULAR; INTRAVENOUS at 13:17

## 2019-02-19 RX ADMIN — METHYLPREDNISOLONE SODIUM SUCCINATE 40 MG: 40 INJECTION, POWDER, FOR SOLUTION INTRAMUSCULAR; INTRAVENOUS at 13:58

## 2019-02-19 ASSESSMENT — PAIN DESCRIPTION - ONSET: ONSET: ON-GOING

## 2019-02-19 ASSESSMENT — ENCOUNTER SYMPTOMS
DIARRHEA: 1
VOMITING: 1
SHORTNESS OF BREATH: 0
BACK PAIN: 0
CONSTIPATION: 0
COUGH: 0
NAUSEA: 1
SORE THROAT: 0
ABDOMINAL PAIN: 1

## 2019-02-19 ASSESSMENT — PAIN SCALES - GENERAL
PAINLEVEL_OUTOF10: 5
PAINLEVEL_OUTOF10: 8
PAINLEVEL_OUTOF10: 9
PAINLEVEL_OUTOF10: 9
PAINLEVEL_OUTOF10: 8

## 2019-02-19 ASSESSMENT — PAIN DESCRIPTION - LOCATION
LOCATION: ABDOMEN

## 2019-02-19 ASSESSMENT — PAIN DESCRIPTION - PAIN TYPE
TYPE: ACUTE PAIN

## 2019-02-19 ASSESSMENT — PAIN DESCRIPTION - DESCRIPTORS
DESCRIPTORS: CRAMPING
DESCRIPTORS: CRAMPING

## 2019-02-19 ASSESSMENT — PAIN DESCRIPTION - FREQUENCY
FREQUENCY: INTERMITTENT
FREQUENCY: CONTINUOUS

## 2019-02-19 ASSESSMENT — PAIN - FUNCTIONAL ASSESSMENT: PAIN_FUNCTIONAL_ASSESSMENT: PREVENTS OR INTERFERES SOME ACTIVE ACTIVITIES AND ADLS

## 2019-02-20 ENCOUNTER — ANESTHESIA EVENT (OUTPATIENT)
Dept: ENDOSCOPY | Age: 74
DRG: 375 | End: 2019-02-20
Payer: COMMERCIAL

## 2019-02-20 ENCOUNTER — ANESTHESIA (OUTPATIENT)
Dept: ENDOSCOPY | Age: 74
DRG: 375 | End: 2019-02-20
Payer: COMMERCIAL

## 2019-02-20 VITALS
DIASTOLIC BLOOD PRESSURE: 72 MMHG | SYSTOLIC BLOOD PRESSURE: 137 MMHG | OXYGEN SATURATION: 100 % | RESPIRATION RATE: 16 BRPM

## 2019-02-20 PROBLEM — K31.89 DUODENAL MASS: Status: ACTIVE | Noted: 2019-02-19

## 2019-02-20 LAB
ABSOLUTE EOS #: <0.03 K/UL (ref 0–0.44)
ABSOLUTE IMMATURE GRANULOCYTE: 0.11 K/UL (ref 0–0.3)
ABSOLUTE LYMPH #: 1.95 K/UL (ref 1.1–3.7)
ABSOLUTE MONO #: 0.32 K/UL (ref 0.1–1.2)
AFP: 3 UG/L
ALBUMIN SERPL-MCNC: 3.3 G/DL (ref 3.5–5.2)
ALBUMIN/GLOBULIN RATIO: 1 (ref 1–2.5)
ALP BLD-CCNC: 178 U/L (ref 35–104)
ALT SERPL-CCNC: 19 U/L (ref 5–33)
ANION GAP SERPL CALCULATED.3IONS-SCNC: 16 MMOL/L (ref 9–17)
AST SERPL-CCNC: 36 U/L
BASOPHILS # BLD: 0 % (ref 0–2)
BASOPHILS ABSOLUTE: <0.03 K/UL (ref 0–0.2)
BILIRUB SERPL-MCNC: 0.37 MG/DL (ref 0.3–1.2)
BILIRUBIN DIRECT: 0.14 MG/DL
BILIRUBIN, INDIRECT: 0.23 MG/DL (ref 0–1)
BUN BLDV-MCNC: 14 MG/DL (ref 8–23)
BUN/CREAT BLD: ABNORMAL (ref 9–20)
CA 19-9: 14 U/ML (ref 0–35)
CALCIUM SERPL-MCNC: 8.9 MG/DL (ref 8.6–10.4)
CARCINOEMBRYONIC ANTIGEN: 3.8 NG/ML
CHLORIDE BLD-SCNC: 106 MMOL/L (ref 98–107)
CO2: 20 MMOL/L (ref 20–31)
CREAT SERPL-MCNC: 0.81 MG/DL (ref 0.5–0.9)
CULTURE: NORMAL
DIFFERENTIAL TYPE: ABNORMAL
EKG ATRIAL RATE: 63 BPM
EKG P AXIS: 52 DEGREES
EKG P-R INTERVAL: 160 MS
EKG Q-T INTERVAL: 404 MS
EKG QRS DURATION: 90 MS
EKG QTC CALCULATION (BAZETT): 413 MS
EKG R AXIS: 17 DEGREES
EKG T AXIS: 6 DEGREES
EKG VENTRICULAR RATE: 63 BPM
EOSINOPHILS RELATIVE PERCENT: 0 % (ref 1–4)
GFR AFRICAN AMERICAN: >60 ML/MIN
GFR NON-AFRICAN AMERICAN: >60 ML/MIN
GFR SERPL CREATININE-BSD FRML MDRD: ABNORMAL ML/MIN/{1.73_M2}
GFR SERPL CREATININE-BSD FRML MDRD: ABNORMAL ML/MIN/{1.73_M2}
GLOBULIN: ABNORMAL G/DL (ref 1.5–3.8)
GLUCOSE BLD-MCNC: 138 MG/DL (ref 70–99)
HCT VFR BLD CALC: 31 % (ref 36.3–47.1)
HEMOGLOBIN: 9.8 G/DL (ref 11.9–15.1)
IMMATURE GRANULOCYTES: 1 %
INR BLD: 1.1
LYMPHOCYTES # BLD: 15 % (ref 24–43)
Lab: NORMAL
MAGNESIUM: 2.1 MG/DL (ref 1.6–2.6)
MCH RBC QN AUTO: 28.1 PG (ref 25.2–33.5)
MCHC RBC AUTO-ENTMCNC: 31.6 G/DL (ref 28.4–34.8)
MCV RBC AUTO: 88.8 FL (ref 82.6–102.9)
MONOCYTES # BLD: 2 % (ref 3–12)
NRBC AUTOMATED: 0 PER 100 WBC
PARTIAL THROMBOPLASTIN TIME: 30 SEC (ref 20.5–30.5)
PDW BLD-RTO: 14 % (ref 11.8–14.4)
PLATELET # BLD: 305 K/UL (ref 138–453)
PLATELET ESTIMATE: ABNORMAL
PMV BLD AUTO: 9.7 FL (ref 8.1–13.5)
POTASSIUM SERPL-SCNC: 3.4 MMOL/L (ref 3.7–5.3)
PROTHROMBIN TIME: 11.8 SEC (ref 9–12)
RBC # BLD: 3.49 M/UL (ref 3.95–5.11)
RBC # BLD: ABNORMAL 10*6/UL
SEG NEUTROPHILS: 82 % (ref 36–65)
SEGMENTED NEUTROPHILS ABSOLUTE COUNT: 10.77 K/UL (ref 1.5–8.1)
SODIUM BLD-SCNC: 142 MMOL/L (ref 135–144)
SPECIMEN DESCRIPTION: NORMAL
TOTAL PROTEIN: 6.7 G/DL (ref 6.4–8.3)
WBC # BLD: 13.2 K/UL (ref 3.5–11.3)
WBC # BLD: ABNORMAL 10*3/UL

## 2019-02-20 PROCEDURE — 6360000002 HC RX W HCPCS: Performed by: STUDENT IN AN ORGANIZED HEALTH CARE EDUCATION/TRAINING PROGRAM

## 2019-02-20 PROCEDURE — 2709999900 HC NON-CHARGEABLE SUPPLY: Performed by: INTERNAL MEDICINE

## 2019-02-20 PROCEDURE — 99232 SBSQ HOSP IP/OBS MODERATE 35: CPT | Performed by: INTERNAL MEDICINE

## 2019-02-20 PROCEDURE — 0D558ZZ DESTRUCTION OF ESOPHAGUS, VIA NATURAL OR ARTIFICIAL OPENING ENDOSCOPIC: ICD-10-PCS | Performed by: INTERNAL MEDICINE

## 2019-02-20 PROCEDURE — 2580000003 HC RX 258: Performed by: HOSPITALIST

## 2019-02-20 PROCEDURE — 2720000010 HC SURG SUPPLY STERILE: Performed by: INTERNAL MEDICINE

## 2019-02-20 PROCEDURE — 43239 EGD BIOPSY SINGLE/MULTIPLE: CPT | Performed by: INTERNAL MEDICINE

## 2019-02-20 PROCEDURE — 7100000010 HC PHASE II RECOVERY - FIRST 15 MIN: Performed by: INTERNAL MEDICINE

## 2019-02-20 PROCEDURE — 88305 TISSUE EXAM BY PATHOLOGIST: CPT

## 2019-02-20 PROCEDURE — 97166 OT EVAL MOD COMPLEX 45 MIN: CPT

## 2019-02-20 PROCEDURE — C9113 INJ PANTOPRAZOLE SODIUM, VIA: HCPCS | Performed by: INTERNAL MEDICINE

## 2019-02-20 PROCEDURE — 6360000002 HC RX W HCPCS: Performed by: NURSE ANESTHETIST, CERTIFIED REGISTERED

## 2019-02-20 PROCEDURE — 1200000000 HC SEMI PRIVATE

## 2019-02-20 PROCEDURE — 36415 COLL VENOUS BLD VENIPUNCTURE: CPT

## 2019-02-20 PROCEDURE — 7100000011 HC PHASE II RECOVERY - ADDTL 15 MIN: Performed by: INTERNAL MEDICINE

## 2019-02-20 PROCEDURE — 80048 BASIC METABOLIC PNL TOTAL CA: CPT

## 2019-02-20 PROCEDURE — 3700000001 HC ADD 15 MINUTES (ANESTHESIA): Performed by: INTERNAL MEDICINE

## 2019-02-20 PROCEDURE — 43255 EGD CONTROL BLEEDING ANY: CPT | Performed by: INTERNAL MEDICINE

## 2019-02-20 PROCEDURE — 85610 PROTHROMBIN TIME: CPT

## 2019-02-20 PROCEDURE — 6360000002 HC RX W HCPCS: Performed by: INTERNAL MEDICINE

## 2019-02-20 PROCEDURE — 3700000000 HC ANESTHESIA ATTENDED CARE: Performed by: INTERNAL MEDICINE

## 2019-02-20 PROCEDURE — 94640 AIRWAY INHALATION TREATMENT: CPT

## 2019-02-20 PROCEDURE — APPNB60 APP NON BILLABLE TIME 46-60 MINS: Performed by: INTERNAL MEDICINE

## 2019-02-20 PROCEDURE — 2500000003 HC RX 250 WO HCPCS: Performed by: NURSE ANESTHETIST, CERTIFIED REGISTERED

## 2019-02-20 PROCEDURE — 85730 THROMBOPLASTIN TIME PARTIAL: CPT

## 2019-02-20 PROCEDURE — 94760 N-INVAS EAR/PLS OXIMETRY 1: CPT

## 2019-02-20 PROCEDURE — 0DB98ZX EXCISION OF DUODENUM, VIA NATURAL OR ARTIFICIAL OPENING ENDOSCOPIC, DIAGNOSTIC: ICD-10-PCS | Performed by: INTERNAL MEDICINE

## 2019-02-20 PROCEDURE — 83735 ASSAY OF MAGNESIUM: CPT

## 2019-02-20 PROCEDURE — 2580000003 HC RX 258: Performed by: INTERNAL MEDICINE

## 2019-02-20 PROCEDURE — 3609013000 HC EGD TRANSORAL CONTROL BLEEDING ANY METHOD: Performed by: INTERNAL MEDICINE

## 2019-02-20 PROCEDURE — 6370000000 HC RX 637 (ALT 250 FOR IP): Performed by: HOSPITALIST

## 2019-02-20 PROCEDURE — 99231 SBSQ HOSP IP/OBS SF/LOW 25: CPT | Performed by: INTERNAL MEDICINE

## 2019-02-20 PROCEDURE — 97530 THERAPEUTIC ACTIVITIES: CPT

## 2019-02-20 PROCEDURE — 97535 SELF CARE MNGMENT TRAINING: CPT

## 2019-02-20 PROCEDURE — 6360000002 HC RX W HCPCS: Performed by: HOSPITALIST

## 2019-02-20 PROCEDURE — 85025 COMPLETE CBC W/AUTO DIFF WBC: CPT

## 2019-02-20 PROCEDURE — 3609012400 HC EGD TRANSORAL BIOPSY SINGLE/MULTIPLE: Performed by: INTERNAL MEDICINE

## 2019-02-20 PROCEDURE — 97162 PT EVAL MOD COMPLEX 30 MIN: CPT

## 2019-02-20 PROCEDURE — 80076 HEPATIC FUNCTION PANEL: CPT

## 2019-02-20 RX ORDER — MORPHINE SULFATE 2 MG/ML
2 INJECTION, SOLUTION INTRAMUSCULAR; INTRAVENOUS
Status: DISCONTINUED | OUTPATIENT
Start: 2019-02-20 | End: 2019-02-26 | Stop reason: HOSPADM

## 2019-02-20 RX ORDER — 0.9 % SODIUM CHLORIDE 0.9 %
10 VIAL (ML) INJECTION DAILY
Status: DISCONTINUED | OUTPATIENT
Start: 2019-02-20 | End: 2019-02-21

## 2019-02-20 RX ORDER — GLYCOPYRROLATE 1 MG/5 ML
SYRINGE (ML) INTRAVENOUS PRN
Status: DISCONTINUED | OUTPATIENT
Start: 2019-02-20 | End: 2019-02-20 | Stop reason: SDUPTHER

## 2019-02-20 RX ORDER — PANTOPRAZOLE SODIUM 40 MG/10ML
40 INJECTION, POWDER, LYOPHILIZED, FOR SOLUTION INTRAVENOUS DAILY
Status: DISCONTINUED | OUTPATIENT
Start: 2019-02-20 | End: 2019-02-21

## 2019-02-20 RX ORDER — LIDOCAINE HYDROCHLORIDE 10 MG/ML
INJECTION, SOLUTION EPIDURAL; INFILTRATION; INTRACAUDAL; PERINEURAL PRN
Status: DISCONTINUED | OUTPATIENT
Start: 2019-02-20 | End: 2019-02-20 | Stop reason: SDUPTHER

## 2019-02-20 RX ORDER — PROPOFOL 10 MG/ML
INJECTION, EMULSION INTRAVENOUS PRN
Status: DISCONTINUED | OUTPATIENT
Start: 2019-02-20 | End: 2019-02-20 | Stop reason: SDUPTHER

## 2019-02-20 RX ADMIN — CROMOLYN SODIUM 1 DROP: 40 SOLUTION/ DROPS OPHTHALMIC at 21:23

## 2019-02-20 RX ADMIN — CROMOLYN SODIUM 1 DROP: 40 SOLUTION/ DROPS OPHTHALMIC at 17:12

## 2019-02-20 RX ADMIN — Medication 0.2 MG: at 11:38

## 2019-02-20 RX ADMIN — SODIUM CHLORIDE 10 ML: 9 INJECTION INTRAMUSCULAR; INTRAVENOUS; SUBCUTANEOUS at 10:16

## 2019-02-20 RX ADMIN — CROMOLYN SODIUM 1 DROP: 40 SOLUTION/ DROPS OPHTHALMIC at 09:00

## 2019-02-20 RX ADMIN — AMLODIPINE BESYLATE 5 MG: 5 TABLET ORAL at 08:55

## 2019-02-20 RX ADMIN — MORPHINE SULFATE 2 MG: 2 INJECTION, SOLUTION INTRAMUSCULAR; INTRAVENOUS at 22:08

## 2019-02-20 RX ADMIN — MORPHINE SULFATE 2 MG: 2 INJECTION, SOLUTION INTRAMUSCULAR; INTRAVENOUS at 17:12

## 2019-02-20 RX ADMIN — DESMOPRESSIN ACETATE 40 MG: 0.2 TABLET ORAL at 22:09

## 2019-02-20 RX ADMIN — MORPHINE SULFATE 2 MG: 2 INJECTION, SOLUTION INTRAMUSCULAR; INTRAVENOUS at 19:33

## 2019-02-20 RX ADMIN — MOMETASONE FUROATE AND FORMOTEROL FUMARATE DIHYDRATE 2 PUFF: 200; 5 AEROSOL RESPIRATORY (INHALATION) at 21:29

## 2019-02-20 RX ADMIN — Medication 10 ML: at 08:55

## 2019-02-20 RX ADMIN — MORPHINE SULFATE 2 MG: 2 INJECTION, SOLUTION INTRAMUSCULAR; INTRAVENOUS at 08:55

## 2019-02-20 RX ADMIN — PROPOFOL 300 MG: 10 INJECTION, EMULSION INTRAVENOUS at 11:38

## 2019-02-20 RX ADMIN — ENOXAPARIN SODIUM 40 MG: 40 INJECTION SUBCUTANEOUS at 08:55

## 2019-02-20 RX ADMIN — LIDOCAINE HYDROCHLORIDE 100 MG: 10 INJECTION, SOLUTION EPIDURAL; INFILTRATION; INTRACAUDAL at 11:38

## 2019-02-20 RX ADMIN — MOMETASONE FUROATE AND FORMOTEROL FUMARATE DIHYDRATE 2 PUFF: 200; 5 AEROSOL RESPIRATORY (INHALATION) at 07:52

## 2019-02-20 RX ADMIN — SODIUM CHLORIDE: 9 INJECTION, SOLUTION INTRAVENOUS at 16:15

## 2019-02-20 RX ADMIN — SODIUM CHLORIDE: 9 INJECTION, SOLUTION INTRAVENOUS at 00:12

## 2019-02-20 RX ADMIN — PANTOPRAZOLE SODIUM 40 MG: 40 INJECTION, POWDER, FOR SOLUTION INTRAVENOUS at 10:16

## 2019-02-20 ASSESSMENT — PAIN SCALES - GENERAL
PAINLEVEL_OUTOF10: 8
PAINLEVEL_OUTOF10: 5
PAINLEVEL_OUTOF10: 4
PAINLEVEL_OUTOF10: 3
PAINLEVEL_OUTOF10: 0
PAINLEVEL_OUTOF10: 5
PAINLEVEL_OUTOF10: 8
PAINLEVEL_OUTOF10: 9

## 2019-02-20 ASSESSMENT — PAIN DESCRIPTION - ORIENTATION
ORIENTATION: RIGHT;LEFT;LOWER;MID
ORIENTATION: RIGHT

## 2019-02-20 ASSESSMENT — ENCOUNTER SYMPTOMS
SHORTNESS OF BREATH: 0
STRIDOR: 0

## 2019-02-20 ASSESSMENT — PAIN DESCRIPTION - PAIN TYPE
TYPE: ACUTE PAIN
TYPE: ACUTE PAIN

## 2019-02-20 ASSESSMENT — PAIN DESCRIPTION - FREQUENCY: FREQUENCY: CONTINUOUS

## 2019-02-20 ASSESSMENT — PAIN SCALES - WONG BAKER: WONGBAKER_NUMERICALRESPONSE: 0

## 2019-02-20 ASSESSMENT — PAIN DESCRIPTION - LOCATION
LOCATION: ABDOMEN

## 2019-02-20 ASSESSMENT — PAIN DESCRIPTION - PROGRESSION: CLINICAL_PROGRESSION: NOT CHANGED

## 2019-02-20 ASSESSMENT — PAIN - FUNCTIONAL ASSESSMENT: PAIN_FUNCTIONAL_ASSESSMENT: 0-10

## 2019-02-20 ASSESSMENT — PAIN DESCRIPTION - DESCRIPTORS: DESCRIPTORS: ACHING;CONSTANT;DISCOMFORT

## 2019-02-20 ASSESSMENT — PAIN DESCRIPTION - ONSET: ONSET: ON-GOING

## 2019-02-21 LAB
ABSOLUTE EOS #: 0.05 K/UL (ref 0–0.44)
ABSOLUTE IMMATURE GRANULOCYTE: 0.11 K/UL (ref 0–0.3)
ABSOLUTE LYMPH #: 3.29 K/UL (ref 1.1–3.7)
ABSOLUTE MONO #: 1.07 K/UL (ref 0.1–1.2)
BASOPHILS # BLD: 0 % (ref 0–2)
BASOPHILS ABSOLUTE: <0.03 K/UL (ref 0–0.2)
DIFFERENTIAL TYPE: ABNORMAL
EOSINOPHILS RELATIVE PERCENT: 0 % (ref 1–4)
HCT VFR BLD CALC: 28.8 % (ref 36.3–47.1)
HEMOGLOBIN: 9.1 G/DL (ref 11.9–15.1)
IMMATURE GRANULOCYTES: 1 %
LYMPHOCYTES # BLD: 19 % (ref 24–43)
MCH RBC QN AUTO: 27.9 PG (ref 25.2–33.5)
MCHC RBC AUTO-ENTMCNC: 31.6 G/DL (ref 28.4–34.8)
MCV RBC AUTO: 88.3 FL (ref 82.6–102.9)
MONOCYTES # BLD: 6 % (ref 3–12)
NRBC AUTOMATED: 0 PER 100 WBC
PDW BLD-RTO: 14.3 % (ref 11.8–14.4)
PLATELET # BLD: 308 K/UL (ref 138–453)
PLATELET ESTIMATE: ABNORMAL
PMV BLD AUTO: 9.8 FL (ref 8.1–13.5)
RBC # BLD: 3.26 M/UL (ref 3.95–5.11)
RBC # BLD: ABNORMAL 10*6/UL
SEG NEUTROPHILS: 74 % (ref 36–65)
SEGMENTED NEUTROPHILS ABSOLUTE COUNT: 13.02 K/UL (ref 1.5–8.1)
SURGICAL PATHOLOGY REPORT: NORMAL
WBC # BLD: 17.6 K/UL (ref 3.5–11.3)
WBC # BLD: ABNORMAL 10*3/UL

## 2019-02-21 PROCEDURE — 85025 COMPLETE CBC W/AUTO DIFF WBC: CPT

## 2019-02-21 PROCEDURE — 2580000003 HC RX 258: Performed by: STUDENT IN AN ORGANIZED HEALTH CARE EDUCATION/TRAINING PROGRAM

## 2019-02-21 PROCEDURE — 97110 THERAPEUTIC EXERCISES: CPT

## 2019-02-21 PROCEDURE — 2580000003 HC RX 258: Performed by: HOSPITALIST

## 2019-02-21 PROCEDURE — 36415 COLL VENOUS BLD VENIPUNCTURE: CPT

## 2019-02-21 PROCEDURE — 2580000003 HC RX 258: Performed by: INTERNAL MEDICINE

## 2019-02-21 PROCEDURE — 6360000002 HC RX W HCPCS: Performed by: HOSPITALIST

## 2019-02-21 PROCEDURE — 6370000000 HC RX 637 (ALT 250 FOR IP): Performed by: HOSPITALIST

## 2019-02-21 PROCEDURE — 6360000002 HC RX W HCPCS: Performed by: STUDENT IN AN ORGANIZED HEALTH CARE EDUCATION/TRAINING PROGRAM

## 2019-02-21 PROCEDURE — 94640 AIRWAY INHALATION TREATMENT: CPT

## 2019-02-21 PROCEDURE — 99222 1ST HOSP IP/OBS MODERATE 55: CPT | Performed by: INTERNAL MEDICINE

## 2019-02-21 PROCEDURE — 1200000000 HC SEMI PRIVATE

## 2019-02-21 PROCEDURE — 6360000002 HC RX W HCPCS: Performed by: INTERNAL MEDICINE

## 2019-02-21 PROCEDURE — 99232 SBSQ HOSP IP/OBS MODERATE 35: CPT | Performed by: INTERNAL MEDICINE

## 2019-02-21 PROCEDURE — C9113 INJ PANTOPRAZOLE SODIUM, VIA: HCPCS | Performed by: STUDENT IN AN ORGANIZED HEALTH CARE EDUCATION/TRAINING PROGRAM

## 2019-02-21 PROCEDURE — 99231 SBSQ HOSP IP/OBS SF/LOW 25: CPT | Performed by: INTERNAL MEDICINE

## 2019-02-21 PROCEDURE — 6370000000 HC RX 637 (ALT 250 FOR IP): Performed by: INTERNAL MEDICINE

## 2019-02-21 PROCEDURE — 97530 THERAPEUTIC ACTIVITIES: CPT

## 2019-02-21 PROCEDURE — C9113 INJ PANTOPRAZOLE SODIUM, VIA: HCPCS | Performed by: INTERNAL MEDICINE

## 2019-02-21 RX ORDER — DOCUSATE SODIUM 100 MG/1
100 CAPSULE, LIQUID FILLED ORAL DAILY
Status: DISCONTINUED | OUTPATIENT
Start: 2019-02-21 | End: 2019-02-23

## 2019-02-21 RX ORDER — 0.9 % SODIUM CHLORIDE 0.9 %
10 VIAL (ML) INJECTION 2 TIMES DAILY
Status: DISCONTINUED | OUTPATIENT
Start: 2019-02-21 | End: 2019-02-26

## 2019-02-21 RX ORDER — PANTOPRAZOLE SODIUM 40 MG/10ML
40 INJECTION, POWDER, LYOPHILIZED, FOR SOLUTION INTRAVENOUS 2 TIMES DAILY
Status: DISCONTINUED | OUTPATIENT
Start: 2019-02-21 | End: 2019-02-26

## 2019-02-21 RX ADMIN — MORPHINE SULFATE 2 MG: 2 INJECTION, SOLUTION INTRAMUSCULAR; INTRAVENOUS at 13:30

## 2019-02-21 RX ADMIN — CROMOLYN SODIUM 1 DROP: 40 SOLUTION/ DROPS OPHTHALMIC at 08:52

## 2019-02-21 RX ADMIN — MORPHINE SULFATE 2 MG: 2 INJECTION, SOLUTION INTRAMUSCULAR; INTRAVENOUS at 07:30

## 2019-02-21 RX ADMIN — Medication 10 ML: at 21:03

## 2019-02-21 RX ADMIN — Medication 10 ML: at 21:02

## 2019-02-21 RX ADMIN — CROMOLYN SODIUM 1 DROP: 40 SOLUTION/ DROPS OPHTHALMIC at 17:24

## 2019-02-21 RX ADMIN — MORPHINE SULFATE 2 MG: 2 INJECTION, SOLUTION INTRAMUSCULAR; INTRAVENOUS at 18:19

## 2019-02-21 RX ADMIN — SODIUM CHLORIDE: 9 INJECTION, SOLUTION INTRAVENOUS at 03:24

## 2019-02-21 RX ADMIN — Medication 10 ML: at 08:55

## 2019-02-21 RX ADMIN — MORPHINE SULFATE 2 MG: 2 INJECTION, SOLUTION INTRAMUSCULAR; INTRAVENOUS at 01:06

## 2019-02-21 RX ADMIN — DESMOPRESSIN ACETATE 40 MG: 0.2 TABLET ORAL at 21:02

## 2019-02-21 RX ADMIN — SODIUM CHLORIDE 10 ML: 9 INJECTION INTRAMUSCULAR; INTRAVENOUS; SUBCUTANEOUS at 08:51

## 2019-02-21 RX ADMIN — MAGNESIUM HYDROXIDE 30 ML: 400 SUSPENSION ORAL at 12:26

## 2019-02-21 RX ADMIN — ENOXAPARIN SODIUM 40 MG: 40 INJECTION SUBCUTANEOUS at 08:52

## 2019-02-21 RX ADMIN — MORPHINE SULFATE 2 MG: 2 INJECTION, SOLUTION INTRAMUSCULAR; INTRAVENOUS at 10:10

## 2019-02-21 RX ADMIN — DOCUSATE SODIUM 100 MG: 100 CAPSULE, LIQUID FILLED ORAL at 15:49

## 2019-02-21 RX ADMIN — CROMOLYN SODIUM 1 DROP: 40 SOLUTION/ DROPS OPHTHALMIC at 12:26

## 2019-02-21 RX ADMIN — ONDANSETRON 4 MG: 2 INJECTION INTRAMUSCULAR; INTRAVENOUS at 19:10

## 2019-02-21 RX ADMIN — AMLODIPINE BESYLATE 5 MG: 5 TABLET ORAL at 08:51

## 2019-02-21 RX ADMIN — MORPHINE SULFATE 2 MG: 2 INJECTION, SOLUTION INTRAMUSCULAR; INTRAVENOUS at 21:01

## 2019-02-21 RX ADMIN — POTASSIUM CHLORIDE 40 MEQ: 1500 TABLET, EXTENDED RELEASE ORAL at 01:11

## 2019-02-21 RX ADMIN — CROMOLYN SODIUM 1 DROP: 40 SOLUTION/ DROPS OPHTHALMIC at 21:02

## 2019-02-21 RX ADMIN — PANTOPRAZOLE SODIUM 40 MG: 40 INJECTION, POWDER, FOR SOLUTION INTRAVENOUS at 08:51

## 2019-02-21 RX ADMIN — MORPHINE SULFATE 2 MG: 2 INJECTION, SOLUTION INTRAMUSCULAR; INTRAVENOUS at 04:54

## 2019-02-21 RX ADMIN — MOMETASONE FUROATE AND FORMOTEROL FUMARATE DIHYDRATE 2 PUFF: 200; 5 AEROSOL RESPIRATORY (INHALATION) at 20:34

## 2019-02-21 RX ADMIN — PANTOPRAZOLE SODIUM 40 MG: 40 INJECTION, POWDER, FOR SOLUTION INTRAVENOUS at 21:01

## 2019-02-21 RX ADMIN — MORPHINE SULFATE 2 MG: 2 INJECTION, SOLUTION INTRAMUSCULAR; INTRAVENOUS at 15:54

## 2019-02-21 ASSESSMENT — PAIN SCALES - GENERAL
PAINLEVEL_OUTOF10: 10
PAINLEVEL_OUTOF10: 8
PAINLEVEL_OUTOF10: 8
PAINLEVEL_OUTOF10: 4
PAINLEVEL_OUTOF10: 9
PAINLEVEL_OUTOF10: 8
PAINLEVEL_OUTOF10: 6
PAINLEVEL_OUTOF10: 4
PAINLEVEL_OUTOF10: 4
PAINLEVEL_OUTOF10: 9
PAINLEVEL_OUTOF10: 8
PAINLEVEL_OUTOF10: 7

## 2019-02-22 ENCOUNTER — ANESTHESIA (OUTPATIENT)
Dept: ENDOSCOPY | Age: 74
DRG: 375 | End: 2019-02-22
Payer: COMMERCIAL

## 2019-02-22 ENCOUNTER — ANESTHESIA EVENT (OUTPATIENT)
Dept: ENDOSCOPY | Age: 74
DRG: 375 | End: 2019-02-22
Payer: COMMERCIAL

## 2019-02-22 VITALS
SYSTOLIC BLOOD PRESSURE: 84 MMHG | RESPIRATION RATE: 17 BRPM | DIASTOLIC BLOOD PRESSURE: 41 MMHG | OXYGEN SATURATION: 97 %

## 2019-02-22 LAB
ABSOLUTE EOS #: 0.24 K/UL (ref 0–0.44)
ABSOLUTE IMMATURE GRANULOCYTE: 0.09 K/UL (ref 0–0.3)
ABSOLUTE LYMPH #: 2.08 K/UL (ref 1.1–3.7)
ABSOLUTE MONO #: 0.77 K/UL (ref 0.1–1.2)
ANION GAP SERPL CALCULATED.3IONS-SCNC: 15 MMOL/L (ref 9–17)
BASOPHILS # BLD: 0 % (ref 0–2)
BASOPHILS ABSOLUTE: <0.03 K/UL (ref 0–0.2)
BUN BLDV-MCNC: 8 MG/DL (ref 8–23)
BUN/CREAT BLD: ABNORMAL (ref 9–20)
CALCIUM SERPL-MCNC: 8.8 MG/DL (ref 8.6–10.4)
CHLORIDE BLD-SCNC: 105 MMOL/L (ref 98–107)
CO2: 20 MMOL/L (ref 20–31)
CREAT SERPL-MCNC: 0.76 MG/DL (ref 0.5–0.9)
DIFFERENTIAL TYPE: ABNORMAL
EOSINOPHILS RELATIVE PERCENT: 2 % (ref 1–4)
GFR AFRICAN AMERICAN: >60 ML/MIN
GFR NON-AFRICAN AMERICAN: >60 ML/MIN
GFR SERPL CREATININE-BSD FRML MDRD: ABNORMAL ML/MIN/{1.73_M2}
GFR SERPL CREATININE-BSD FRML MDRD: ABNORMAL ML/MIN/{1.73_M2}
GLUCOSE BLD-MCNC: 97 MG/DL (ref 70–99)
HCT VFR BLD CALC: 35.1 % (ref 36.3–47.1)
HEMOGLOBIN: 10.9 G/DL (ref 11.9–15.1)
IMMATURE GRANULOCYTES: 1 %
LYMPHOCYTES # BLD: 18 % (ref 24–43)
MCH RBC QN AUTO: 28.6 PG (ref 25.2–33.5)
MCHC RBC AUTO-ENTMCNC: 31.1 G/DL (ref 28.4–34.8)
MCV RBC AUTO: 92.1 FL (ref 82.6–102.9)
MONOCYTES # BLD: 7 % (ref 3–12)
NRBC AUTOMATED: 0 PER 100 WBC
PDW BLD-RTO: 14.2 % (ref 11.8–14.4)
PLATELET # BLD: 319 K/UL (ref 138–453)
PLATELET ESTIMATE: ABNORMAL
PMV BLD AUTO: 9.4 FL (ref 8.1–13.5)
POTASSIUM SERPL-SCNC: 3.6 MMOL/L (ref 3.7–5.3)
RBC # BLD: 3.81 M/UL (ref 3.95–5.11)
RBC # BLD: ABNORMAL 10*6/UL
SEG NEUTROPHILS: 72 % (ref 36–65)
SEGMENTED NEUTROPHILS ABSOLUTE COUNT: 8.63 K/UL (ref 1.5–8.1)
SODIUM BLD-SCNC: 140 MMOL/L (ref 135–144)
WBC # BLD: 11.8 K/UL (ref 3.5–11.3)
WBC # BLD: ABNORMAL 10*3/UL

## 2019-02-22 PROCEDURE — 2709999900 HC NON-CHARGEABLE SUPPLY: Performed by: INTERNAL MEDICINE

## 2019-02-22 PROCEDURE — 85025 COMPLETE CBC W/AUTO DIFF WBC: CPT

## 2019-02-22 PROCEDURE — 6370000000 HC RX 637 (ALT 250 FOR IP): Performed by: INTERNAL MEDICINE

## 2019-02-22 PROCEDURE — 36415 COLL VENOUS BLD VENIPUNCTURE: CPT

## 2019-02-22 PROCEDURE — 6360000002 HC RX W HCPCS: Performed by: STUDENT IN AN ORGANIZED HEALTH CARE EDUCATION/TRAINING PROGRAM

## 2019-02-22 PROCEDURE — 6360000002 HC RX W HCPCS: Performed by: NURSE ANESTHETIST, CERTIFIED REGISTERED

## 2019-02-22 PROCEDURE — 7100000011 HC PHASE II RECOVERY - ADDTL 15 MIN: Performed by: INTERNAL MEDICINE

## 2019-02-22 PROCEDURE — 6370000000 HC RX 637 (ALT 250 FOR IP): Performed by: HOSPITALIST

## 2019-02-22 PROCEDURE — 2580000003 HC RX 258: Performed by: HOSPITALIST

## 2019-02-22 PROCEDURE — 94640 AIRWAY INHALATION TREATMENT: CPT

## 2019-02-22 PROCEDURE — 0DB98ZX EXCISION OF DUODENUM, VIA NATURAL OR ARTIFICIAL OPENING ENDOSCOPIC, DIAGNOSTIC: ICD-10-PCS | Performed by: INTERNAL MEDICINE

## 2019-02-22 PROCEDURE — 43239 EGD BIOPSY SINGLE/MULTIPLE: CPT | Performed by: INTERNAL MEDICINE

## 2019-02-22 PROCEDURE — 3700000001 HC ADD 15 MINUTES (ANESTHESIA): Performed by: INTERNAL MEDICINE

## 2019-02-22 PROCEDURE — 94760 N-INVAS EAR/PLS OXIMETRY 1: CPT

## 2019-02-22 PROCEDURE — 80048 BASIC METABOLIC PNL TOTAL CA: CPT

## 2019-02-22 PROCEDURE — 2580000003 HC RX 258: Performed by: STUDENT IN AN ORGANIZED HEALTH CARE EDUCATION/TRAINING PROGRAM

## 2019-02-22 PROCEDURE — C9113 INJ PANTOPRAZOLE SODIUM, VIA: HCPCS | Performed by: STUDENT IN AN ORGANIZED HEALTH CARE EDUCATION/TRAINING PROGRAM

## 2019-02-22 PROCEDURE — 2580000003 HC RX 258: Performed by: NURSE ANESTHETIST, CERTIFIED REGISTERED

## 2019-02-22 PROCEDURE — 3609012400 HC EGD TRANSORAL BIOPSY SINGLE/MULTIPLE: Performed by: INTERNAL MEDICINE

## 2019-02-22 PROCEDURE — 88305 TISSUE EXAM BY PATHOLOGIST: CPT

## 2019-02-22 PROCEDURE — 3700000000 HC ANESTHESIA ATTENDED CARE: Performed by: INTERNAL MEDICINE

## 2019-02-22 PROCEDURE — 2500000003 HC RX 250 WO HCPCS: Performed by: NURSE ANESTHETIST, CERTIFIED REGISTERED

## 2019-02-22 PROCEDURE — 7100000010 HC PHASE II RECOVERY - FIRST 15 MIN: Performed by: INTERNAL MEDICINE

## 2019-02-22 PROCEDURE — 6360000002 HC RX W HCPCS: Performed by: INTERNAL MEDICINE

## 2019-02-22 PROCEDURE — 99232 SBSQ HOSP IP/OBS MODERATE 35: CPT | Performed by: INTERNAL MEDICINE

## 2019-02-22 PROCEDURE — 1200000000 HC SEMI PRIVATE

## 2019-02-22 RX ORDER — PROPOFOL 10 MG/ML
INJECTION, EMULSION INTRAVENOUS PRN
Status: DISCONTINUED | OUTPATIENT
Start: 2019-02-22 | End: 2019-02-22 | Stop reason: SDUPTHER

## 2019-02-22 RX ORDER — LIDOCAINE HYDROCHLORIDE 10 MG/ML
INJECTION, SOLUTION INFILTRATION; PERINEURAL PRN
Status: DISCONTINUED | OUTPATIENT
Start: 2019-02-22 | End: 2019-02-22 | Stop reason: SDUPTHER

## 2019-02-22 RX ORDER — SODIUM CHLORIDE 9 MG/ML
INJECTION, SOLUTION INTRAVENOUS CONTINUOUS PRN
Status: DISCONTINUED | OUTPATIENT
Start: 2019-02-22 | End: 2019-02-22 | Stop reason: SDUPTHER

## 2019-02-22 RX ADMIN — Medication 10 ML: at 07:34

## 2019-02-22 RX ADMIN — MOMETASONE FUROATE AND FORMOTEROL FUMARATE DIHYDRATE 2 PUFF: 200; 5 AEROSOL RESPIRATORY (INHALATION) at 20:42

## 2019-02-22 RX ADMIN — MORPHINE SULFATE 2 MG: 2 INJECTION, SOLUTION INTRAMUSCULAR; INTRAVENOUS at 07:34

## 2019-02-22 RX ADMIN — PROPOFOL 50 MG: 10 INJECTION, EMULSION INTRAVENOUS at 10:40

## 2019-02-22 RX ADMIN — MORPHINE SULFATE 2 MG: 2 INJECTION, SOLUTION INTRAMUSCULAR; INTRAVENOUS at 16:52

## 2019-02-22 RX ADMIN — MORPHINE SULFATE 2 MG: 2 INJECTION, SOLUTION INTRAMUSCULAR; INTRAVENOUS at 00:14

## 2019-02-22 RX ADMIN — SODIUM CHLORIDE: 9 INJECTION, SOLUTION INTRAVENOUS at 10:27

## 2019-02-22 RX ADMIN — PROPOFOL 50 MG: 10 INJECTION, EMULSION INTRAVENOUS at 10:30

## 2019-02-22 RX ADMIN — MORPHINE SULFATE 2 MG: 2 INJECTION, SOLUTION INTRAMUSCULAR; INTRAVENOUS at 11:52

## 2019-02-22 RX ADMIN — PROPOFOL 50 MG: 10 INJECTION, EMULSION INTRAVENOUS at 10:34

## 2019-02-22 RX ADMIN — MOMETASONE FUROATE AND FORMOTEROL FUMARATE DIHYDRATE 2 PUFF: 200; 5 AEROSOL RESPIRATORY (INHALATION) at 08:54

## 2019-02-22 RX ADMIN — PROPOFOL 50 MG: 10 INJECTION, EMULSION INTRAVENOUS at 10:55

## 2019-02-22 RX ADMIN — PANTOPRAZOLE SODIUM 40 MG: 40 INJECTION, POWDER, FOR SOLUTION INTRAVENOUS at 20:54

## 2019-02-22 RX ADMIN — PROPOFOL 50 MG: 10 INJECTION, EMULSION INTRAVENOUS at 10:45

## 2019-02-22 RX ADMIN — CROMOLYN SODIUM 1 DROP: 40 SOLUTION/ DROPS OPHTHALMIC at 08:29

## 2019-02-22 RX ADMIN — PROPOFOL 50 MG: 10 INJECTION, EMULSION INTRAVENOUS at 10:51

## 2019-02-22 RX ADMIN — Medication 10 ML: at 20:56

## 2019-02-22 RX ADMIN — LIDOCAINE HYDROCHLORIDE 50 MG: 10 INJECTION, SOLUTION INFILTRATION; PERINEURAL at 10:27

## 2019-02-22 RX ADMIN — PROPOFOL 100 MG: 10 INJECTION, EMULSION INTRAVENOUS at 10:27

## 2019-02-22 RX ADMIN — CROMOLYN SODIUM 1 DROP: 40 SOLUTION/ DROPS OPHTHALMIC at 16:52

## 2019-02-22 RX ADMIN — MORPHINE SULFATE 2 MG: 2 INJECTION, SOLUTION INTRAMUSCULAR; INTRAVENOUS at 19:10

## 2019-02-22 RX ADMIN — Medication 10 ML: at 20:55

## 2019-02-22 RX ADMIN — MORPHINE SULFATE 2 MG: 2 INJECTION, SOLUTION INTRAMUSCULAR; INTRAVENOUS at 14:05

## 2019-02-22 RX ADMIN — CROMOLYN SODIUM 1 DROP: 40 SOLUTION/ DROPS OPHTHALMIC at 14:05

## 2019-02-22 RX ADMIN — PANTOPRAZOLE SODIUM 40 MG: 40 INJECTION, POWDER, FOR SOLUTION INTRAVENOUS at 08:29

## 2019-02-22 RX ADMIN — DOCUSATE SODIUM 100 MG: 100 CAPSULE, LIQUID FILLED ORAL at 08:28

## 2019-02-22 RX ADMIN — Medication 10 ML: at 08:30

## 2019-02-22 RX ADMIN — AMLODIPINE BESYLATE 5 MG: 5 TABLET ORAL at 08:28

## 2019-02-22 RX ADMIN — CROMOLYN SODIUM 1 DROP: 40 SOLUTION/ DROPS OPHTHALMIC at 20:55

## 2019-02-22 RX ADMIN — DESMOPRESSIN ACETATE 40 MG: 0.2 TABLET ORAL at 20:54

## 2019-02-22 ASSESSMENT — PAIN DESCRIPTION - DESCRIPTORS: DESCRIPTORS: CRAMPING

## 2019-02-22 ASSESSMENT — PAIN SCALES - GENERAL
PAINLEVEL_OUTOF10: 8
PAINLEVEL_OUTOF10: 8
PAINLEVEL_OUTOF10: 0
PAINLEVEL_OUTOF10: 8
PAINLEVEL_OUTOF10: 8
PAINLEVEL_OUTOF10: 0
PAINLEVEL_OUTOF10: 9
PAINLEVEL_OUTOF10: 9
PAINLEVEL_OUTOF10: 8
PAINLEVEL_OUTOF10: 0
PAINLEVEL_OUTOF10: 0

## 2019-02-22 ASSESSMENT — PAIN - FUNCTIONAL ASSESSMENT: PAIN_FUNCTIONAL_ASSESSMENT: 0-10

## 2019-02-22 ASSESSMENT — ENCOUNTER SYMPTOMS: SHORTNESS OF BREATH: 1

## 2019-02-22 ASSESSMENT — COPD QUESTIONNAIRES: CAT_SEVERITY: MODERATE

## 2019-02-23 LAB
ABSOLUTE EOS #: 0.31 K/UL (ref 0–0.44)
ABSOLUTE IMMATURE GRANULOCYTE: 0.06 K/UL (ref 0–0.3)
ABSOLUTE LYMPH #: 1.5 K/UL (ref 1.1–3.7)
ABSOLUTE MONO #: 0.62 K/UL (ref 0.1–1.2)
ANION GAP SERPL CALCULATED.3IONS-SCNC: 13 MMOL/L (ref 9–17)
BASOPHILS # BLD: 0 % (ref 0–2)
BASOPHILS ABSOLUTE: <0.03 K/UL (ref 0–0.2)
BUN BLDV-MCNC: 6 MG/DL (ref 8–23)
BUN/CREAT BLD: ABNORMAL (ref 9–20)
CALCIUM SERPL-MCNC: 8.4 MG/DL (ref 8.6–10.4)
CHLORIDE BLD-SCNC: 105 MMOL/L (ref 98–107)
CO2: 22 MMOL/L (ref 20–31)
CREAT SERPL-MCNC: 0.82 MG/DL (ref 0.5–0.9)
DIFFERENTIAL TYPE: ABNORMAL
EOSINOPHILS RELATIVE PERCENT: 3 % (ref 1–4)
GFR AFRICAN AMERICAN: >60 ML/MIN
GFR NON-AFRICAN AMERICAN: >60 ML/MIN
GFR SERPL CREATININE-BSD FRML MDRD: ABNORMAL ML/MIN/{1.73_M2}
GFR SERPL CREATININE-BSD FRML MDRD: ABNORMAL ML/MIN/{1.73_M2}
GLUCOSE BLD-MCNC: 88 MG/DL (ref 70–99)
HCT VFR BLD CALC: 32.1 % (ref 36.3–47.1)
HEMOGLOBIN: 10.3 G/DL (ref 11.9–15.1)
IMMATURE GRANULOCYTES: 1 %
LYMPHOCYTES # BLD: 15 % (ref 24–43)
MCH RBC QN AUTO: 29 PG (ref 25.2–33.5)
MCHC RBC AUTO-ENTMCNC: 32.1 G/DL (ref 28.4–34.8)
MCV RBC AUTO: 90.4 FL (ref 82.6–102.9)
MONOCYTES # BLD: 6 % (ref 3–12)
NRBC AUTOMATED: 0 PER 100 WBC
PDW BLD-RTO: 14.2 % (ref 11.8–14.4)
PLATELET # BLD: 269 K/UL (ref 138–453)
PLATELET ESTIMATE: ABNORMAL
PMV BLD AUTO: 9.5 FL (ref 8.1–13.5)
POTASSIUM SERPL-SCNC: 3.4 MMOL/L (ref 3.7–5.3)
RBC # BLD: 3.55 M/UL (ref 3.95–5.11)
RBC # BLD: ABNORMAL 10*6/UL
SEG NEUTROPHILS: 75 % (ref 36–65)
SEGMENTED NEUTROPHILS ABSOLUTE COUNT: 7.7 K/UL (ref 1.5–8.1)
SODIUM BLD-SCNC: 140 MMOL/L (ref 135–144)
WBC # BLD: 10.2 K/UL (ref 3.5–11.3)
WBC # BLD: ABNORMAL 10*3/UL

## 2019-02-23 PROCEDURE — 97116 GAIT TRAINING THERAPY: CPT

## 2019-02-23 PROCEDURE — 94640 AIRWAY INHALATION TREATMENT: CPT

## 2019-02-23 PROCEDURE — 36415 COLL VENOUS BLD VENIPUNCTURE: CPT

## 2019-02-23 PROCEDURE — 2580000003 HC RX 258: Performed by: STUDENT IN AN ORGANIZED HEALTH CARE EDUCATION/TRAINING PROGRAM

## 2019-02-23 PROCEDURE — C9113 INJ PANTOPRAZOLE SODIUM, VIA: HCPCS | Performed by: STUDENT IN AN ORGANIZED HEALTH CARE EDUCATION/TRAINING PROGRAM

## 2019-02-23 PROCEDURE — 99232 SBSQ HOSP IP/OBS MODERATE 35: CPT | Performed by: INTERNAL MEDICINE

## 2019-02-23 PROCEDURE — 1200000000 HC SEMI PRIVATE

## 2019-02-23 PROCEDURE — APPNB45 APP NON BILLABLE 31-45 MINUTES: Performed by: INTERNAL MEDICINE

## 2019-02-23 PROCEDURE — 6360000002 HC RX W HCPCS: Performed by: STUDENT IN AN ORGANIZED HEALTH CARE EDUCATION/TRAINING PROGRAM

## 2019-02-23 PROCEDURE — 85025 COMPLETE CBC W/AUTO DIFF WBC: CPT

## 2019-02-23 PROCEDURE — 94761 N-INVAS EAR/PLS OXIMETRY MLT: CPT

## 2019-02-23 PROCEDURE — 6370000000 HC RX 637 (ALT 250 FOR IP): Performed by: HOSPITALIST

## 2019-02-23 PROCEDURE — 6360000002 HC RX W HCPCS: Performed by: INTERNAL MEDICINE

## 2019-02-23 PROCEDURE — 6370000000 HC RX 637 (ALT 250 FOR IP): Performed by: INTERNAL MEDICINE

## 2019-02-23 PROCEDURE — 80048 BASIC METABOLIC PNL TOTAL CA: CPT

## 2019-02-23 RX ORDER — SENNA AND DOCUSATE SODIUM 50; 8.6 MG/1; MG/1
2 TABLET, FILM COATED ORAL DAILY
Status: DISCONTINUED | OUTPATIENT
Start: 2019-02-23 | End: 2019-02-24

## 2019-02-23 RX ADMIN — PANTOPRAZOLE SODIUM 40 MG: 40 INJECTION, POWDER, FOR SOLUTION INTRAVENOUS at 09:15

## 2019-02-23 RX ADMIN — CROMOLYN SODIUM 1 DROP: 40 SOLUTION/ DROPS OPHTHALMIC at 17:44

## 2019-02-23 RX ADMIN — AMLODIPINE BESYLATE 5 MG: 5 TABLET ORAL at 09:15

## 2019-02-23 RX ADMIN — ENOXAPARIN SODIUM 30 MG: 30 INJECTION SUBCUTANEOUS at 13:38

## 2019-02-23 RX ADMIN — MORPHINE SULFATE 2 MG: 2 INJECTION, SOLUTION INTRAMUSCULAR; INTRAVENOUS at 09:25

## 2019-02-23 RX ADMIN — MORPHINE SULFATE 2 MG: 2 INJECTION, SOLUTION INTRAMUSCULAR; INTRAVENOUS at 21:09

## 2019-02-23 RX ADMIN — MORPHINE SULFATE 2 MG: 2 INJECTION, SOLUTION INTRAMUSCULAR; INTRAVENOUS at 13:42

## 2019-02-23 RX ADMIN — SENNOSIDES AND DOCUSATE SODIUM 2 TABLET: 8.6; 5 TABLET ORAL at 09:28

## 2019-02-23 RX ADMIN — POTASSIUM CHLORIDE 40 MEQ: 1500 TABLET, EXTENDED RELEASE ORAL at 09:15

## 2019-02-23 RX ADMIN — PANTOPRAZOLE SODIUM 40 MG: 40 INJECTION, POWDER, FOR SOLUTION INTRAVENOUS at 21:09

## 2019-02-23 RX ADMIN — MOMETASONE FUROATE AND FORMOTEROL FUMARATE DIHYDRATE 2 PUFF: 200; 5 AEROSOL RESPIRATORY (INHALATION) at 08:25

## 2019-02-23 RX ADMIN — MOMETASONE FUROATE AND FORMOTEROL FUMARATE DIHYDRATE 2 PUFF: 200; 5 AEROSOL RESPIRATORY (INHALATION) at 20:29

## 2019-02-23 RX ADMIN — Medication 10 ML: at 21:09

## 2019-02-23 RX ADMIN — SODIUM CHLORIDE: 9 INJECTION, SOLUTION INTRAVENOUS at 17:47

## 2019-02-23 RX ADMIN — CROMOLYN SODIUM 1 DROP: 40 SOLUTION/ DROPS OPHTHALMIC at 13:39

## 2019-02-23 RX ADMIN — MORPHINE SULFATE 2 MG: 2 INJECTION, SOLUTION INTRAMUSCULAR; INTRAVENOUS at 17:46

## 2019-02-23 RX ADMIN — DESMOPRESSIN ACETATE 40 MG: 0.2 TABLET ORAL at 21:09

## 2019-02-23 RX ADMIN — Medication 10 ML: at 09:16

## 2019-02-23 RX ADMIN — CROMOLYN SODIUM 1 DROP: 40 SOLUTION/ DROPS OPHTHALMIC at 09:16

## 2019-02-23 RX ADMIN — CROMOLYN SODIUM 1 DROP: 40 SOLUTION/ DROPS OPHTHALMIC at 21:09

## 2019-02-23 ASSESSMENT — PAIN DESCRIPTION - ONSET: ONSET: ON-GOING

## 2019-02-23 ASSESSMENT — PAIN SCALES - GENERAL
PAINLEVEL_OUTOF10: 3
PAINLEVEL_OUTOF10: 3
PAINLEVEL_OUTOF10: 8
PAINLEVEL_OUTOF10: 6
PAINLEVEL_OUTOF10: 8
PAINLEVEL_OUTOF10: 6
PAINLEVEL_OUTOF10: 4

## 2019-02-23 ASSESSMENT — PAIN DESCRIPTION - ORIENTATION: ORIENTATION: LOWER

## 2019-02-23 ASSESSMENT — PAIN - FUNCTIONAL ASSESSMENT: PAIN_FUNCTIONAL_ASSESSMENT: ACTIVITIES ARE NOT PREVENTED

## 2019-02-23 ASSESSMENT — PAIN DESCRIPTION - PROGRESSION: CLINICAL_PROGRESSION: NOT CHANGED

## 2019-02-23 ASSESSMENT — PAIN DESCRIPTION - LOCATION: LOCATION: ABDOMEN

## 2019-02-23 ASSESSMENT — PAIN DESCRIPTION - DESCRIPTORS: DESCRIPTORS: ACHING

## 2019-02-23 ASSESSMENT — PAIN DESCRIPTION - PAIN TYPE: TYPE: ACUTE PAIN

## 2019-02-23 ASSESSMENT — PAIN DESCRIPTION - FREQUENCY: FREQUENCY: INTERMITTENT

## 2019-02-24 LAB
ABSOLUTE EOS #: 0.25 K/UL (ref 0–0.44)
ABSOLUTE IMMATURE GRANULOCYTE: 0.08 K/UL (ref 0–0.3)
ABSOLUTE LYMPH #: 2.5 K/UL (ref 1.1–3.7)
ABSOLUTE MONO #: 1.14 K/UL (ref 0.1–1.2)
ANION GAP SERPL CALCULATED.3IONS-SCNC: 15 MMOL/L (ref 9–17)
BASOPHILS # BLD: 0 % (ref 0–2)
BASOPHILS ABSOLUTE: <0.03 K/UL (ref 0–0.2)
BUN BLDV-MCNC: 7 MG/DL (ref 8–23)
BUN/CREAT BLD: ABNORMAL (ref 9–20)
CALCIUM SERPL-MCNC: 8.2 MG/DL (ref 8.6–10.4)
CHLORIDE BLD-SCNC: 109 MMOL/L (ref 98–107)
CO2: 19 MMOL/L (ref 20–31)
CREAT SERPL-MCNC: 0.82 MG/DL (ref 0.5–0.9)
DIFFERENTIAL TYPE: ABNORMAL
EOSINOPHILS RELATIVE PERCENT: 2 % (ref 1–4)
GFR AFRICAN AMERICAN: >60 ML/MIN
GFR NON-AFRICAN AMERICAN: >60 ML/MIN
GFR SERPL CREATININE-BSD FRML MDRD: ABNORMAL ML/MIN/{1.73_M2}
GFR SERPL CREATININE-BSD FRML MDRD: ABNORMAL ML/MIN/{1.73_M2}
GLUCOSE BLD-MCNC: 91 MG/DL (ref 70–99)
HCT VFR BLD CALC: 32.2 % (ref 36.3–47.1)
HEMOGLOBIN: 10.1 G/DL (ref 11.9–15.1)
IMMATURE GRANULOCYTES: 1 %
LYMPHOCYTES # BLD: 19 % (ref 24–43)
MCH RBC QN AUTO: 28.4 PG (ref 25.2–33.5)
MCHC RBC AUTO-ENTMCNC: 31.4 G/DL (ref 28.4–34.8)
MCV RBC AUTO: 90.4 FL (ref 82.6–102.9)
MONOCYTES # BLD: 9 % (ref 3–12)
NRBC AUTOMATED: 0 PER 100 WBC
PDW BLD-RTO: 14.6 % (ref 11.8–14.4)
PLATELET # BLD: 286 K/UL (ref 138–453)
PLATELET ESTIMATE: ABNORMAL
PMV BLD AUTO: 9.9 FL (ref 8.1–13.5)
POTASSIUM SERPL-SCNC: 4.3 MMOL/L (ref 3.7–5.3)
RBC # BLD: 3.56 M/UL (ref 3.95–5.11)
RBC # BLD: ABNORMAL 10*6/UL
SEG NEUTROPHILS: 69 % (ref 36–65)
SEGMENTED NEUTROPHILS ABSOLUTE COUNT: 9.37 K/UL (ref 1.5–8.1)
SODIUM BLD-SCNC: 143 MMOL/L (ref 135–144)
WBC # BLD: 13.4 K/UL (ref 3.5–11.3)
WBC # BLD: ABNORMAL 10*3/UL

## 2019-02-24 PROCEDURE — 2580000003 HC RX 258: Performed by: STUDENT IN AN ORGANIZED HEALTH CARE EDUCATION/TRAINING PROGRAM

## 2019-02-24 PROCEDURE — APPNB60 APP NON BILLABLE TIME 46-60 MINS: Performed by: INTERNAL MEDICINE

## 2019-02-24 PROCEDURE — 6360000002 HC RX W HCPCS: Performed by: INTERNAL MEDICINE

## 2019-02-24 PROCEDURE — 1200000000 HC SEMI PRIVATE

## 2019-02-24 PROCEDURE — 99232 SBSQ HOSP IP/OBS MODERATE 35: CPT | Performed by: INTERNAL MEDICINE

## 2019-02-24 PROCEDURE — 6360000002 HC RX W HCPCS: Performed by: STUDENT IN AN ORGANIZED HEALTH CARE EDUCATION/TRAINING PROGRAM

## 2019-02-24 PROCEDURE — 80048 BASIC METABOLIC PNL TOTAL CA: CPT

## 2019-02-24 PROCEDURE — C9113 INJ PANTOPRAZOLE SODIUM, VIA: HCPCS | Performed by: STUDENT IN AN ORGANIZED HEALTH CARE EDUCATION/TRAINING PROGRAM

## 2019-02-24 PROCEDURE — 6370000000 HC RX 637 (ALT 250 FOR IP): Performed by: INTERNAL MEDICINE

## 2019-02-24 PROCEDURE — 6370000000 HC RX 637 (ALT 250 FOR IP): Performed by: HOSPITALIST

## 2019-02-24 PROCEDURE — 2580000003 HC RX 258: Performed by: HOSPITALIST

## 2019-02-24 PROCEDURE — 94640 AIRWAY INHALATION TREATMENT: CPT

## 2019-02-24 PROCEDURE — 85025 COMPLETE CBC W/AUTO DIFF WBC: CPT

## 2019-02-24 PROCEDURE — 36415 COLL VENOUS BLD VENIPUNCTURE: CPT

## 2019-02-24 RX ORDER — DOCUSATE SODIUM 100 MG/1
100 CAPSULE, LIQUID FILLED ORAL DAILY
Status: DISCONTINUED | OUTPATIENT
Start: 2019-02-24 | End: 2019-02-26

## 2019-02-24 RX ADMIN — ENOXAPARIN SODIUM 30 MG: 30 INJECTION SUBCUTANEOUS at 08:39

## 2019-02-24 RX ADMIN — CROMOLYN SODIUM 1 DROP: 40 SOLUTION/ DROPS OPHTHALMIC at 20:09

## 2019-02-24 RX ADMIN — CROMOLYN SODIUM 1 DROP: 40 SOLUTION/ DROPS OPHTHALMIC at 17:14

## 2019-02-24 RX ADMIN — AMLODIPINE BESYLATE 5 MG: 5 TABLET ORAL at 08:39

## 2019-02-24 RX ADMIN — PANTOPRAZOLE SODIUM 40 MG: 40 INJECTION, POWDER, FOR SOLUTION INTRAVENOUS at 20:09

## 2019-02-24 RX ADMIN — SODIUM CHLORIDE: 9 INJECTION, SOLUTION INTRAVENOUS at 05:35

## 2019-02-24 RX ADMIN — Medication 10 ML: at 20:09

## 2019-02-24 RX ADMIN — MORPHINE SULFATE 2 MG: 2 INJECTION, SOLUTION INTRAMUSCULAR; INTRAVENOUS at 03:24

## 2019-02-24 RX ADMIN — DESMOPRESSIN ACETATE 40 MG: 0.2 TABLET ORAL at 20:10

## 2019-02-24 RX ADMIN — CROMOLYN SODIUM 1 DROP: 40 SOLUTION/ DROPS OPHTHALMIC at 08:41

## 2019-02-24 RX ADMIN — MORPHINE SULFATE 2 MG: 2 INJECTION, SOLUTION INTRAMUSCULAR; INTRAVENOUS at 20:10

## 2019-02-24 RX ADMIN — Medication 10 ML: at 20:10

## 2019-02-24 RX ADMIN — MORPHINE SULFATE 2 MG: 2 INJECTION, SOLUTION INTRAMUSCULAR; INTRAVENOUS at 11:48

## 2019-02-24 RX ADMIN — CROMOLYN SODIUM 1 DROP: 40 SOLUTION/ DROPS OPHTHALMIC at 12:49

## 2019-02-24 RX ADMIN — MORPHINE SULFATE 2 MG: 2 INJECTION, SOLUTION INTRAMUSCULAR; INTRAVENOUS at 07:28

## 2019-02-24 RX ADMIN — MORPHINE SULFATE 2 MG: 2 INJECTION, SOLUTION INTRAMUSCULAR; INTRAVENOUS at 17:15

## 2019-02-24 RX ADMIN — DOCUSATE SODIUM 100 MG: 100 CAPSULE, LIQUID FILLED ORAL at 08:39

## 2019-02-24 RX ADMIN — PANTOPRAZOLE SODIUM 40 MG: 40 INJECTION, POWDER, FOR SOLUTION INTRAVENOUS at 08:39

## 2019-02-24 RX ADMIN — Medication 10 ML: at 08:39

## 2019-02-24 RX ADMIN — MOMETASONE FUROATE AND FORMOTEROL FUMARATE DIHYDRATE 2 PUFF: 200; 5 AEROSOL RESPIRATORY (INHALATION) at 09:39

## 2019-02-24 ASSESSMENT — PAIN DESCRIPTION - FREQUENCY: FREQUENCY: INTERMITTENT

## 2019-02-24 ASSESSMENT — PAIN SCALES - GENERAL
PAINLEVEL_OUTOF10: 4
PAINLEVEL_OUTOF10: 8
PAINLEVEL_OUTOF10: 3
PAINLEVEL_OUTOF10: 4
PAINLEVEL_OUTOF10: 8
PAINLEVEL_OUTOF10: 8
PAINLEVEL_OUTOF10: 6
PAINLEVEL_OUTOF10: 8

## 2019-02-24 ASSESSMENT — PAIN - FUNCTIONAL ASSESSMENT: PAIN_FUNCTIONAL_ASSESSMENT: ACTIVITIES ARE NOT PREVENTED

## 2019-02-24 ASSESSMENT — PAIN DESCRIPTION - DESCRIPTORS: DESCRIPTORS: ACHING

## 2019-02-24 ASSESSMENT — PAIN DESCRIPTION - PROGRESSION: CLINICAL_PROGRESSION: NOT CHANGED

## 2019-02-24 ASSESSMENT — PAIN DESCRIPTION - LOCATION: LOCATION: ABDOMEN

## 2019-02-24 ASSESSMENT — PAIN DESCRIPTION - ONSET: ONSET: GRADUAL

## 2019-02-24 ASSESSMENT — PAIN DESCRIPTION - ORIENTATION: ORIENTATION: RIGHT;LEFT;LOWER

## 2019-02-24 ASSESSMENT — PAIN DESCRIPTION - PAIN TYPE: TYPE: ACUTE PAIN

## 2019-02-25 ENCOUNTER — APPOINTMENT (OUTPATIENT)
Dept: INTERVENTIONAL RADIOLOGY/VASCULAR | Age: 74
DRG: 375 | End: 2019-02-25
Payer: COMMERCIAL

## 2019-02-25 LAB
ABSOLUTE EOS #: 0.28 K/UL (ref 0–0.44)
ABSOLUTE IMMATURE GRANULOCYTE: 0.08 K/UL (ref 0–0.3)
ABSOLUTE LYMPH #: 1.53 K/UL (ref 1.1–3.7)
ABSOLUTE MONO #: 1.26 K/UL (ref 0.1–1.2)
ANION GAP SERPL CALCULATED.3IONS-SCNC: 12 MMOL/L (ref 9–17)
BASOPHILS # BLD: 0 % (ref 0–2)
BASOPHILS ABSOLUTE: <0.03 K/UL (ref 0–0.2)
BUN BLDV-MCNC: 7 MG/DL (ref 8–23)
BUN/CREAT BLD: ABNORMAL (ref 9–20)
CALCIUM SERPL-MCNC: 8.8 MG/DL (ref 8.6–10.4)
CHLORIDE BLD-SCNC: 105 MMOL/L (ref 98–107)
CO2: 19 MMOL/L (ref 20–31)
CREAT SERPL-MCNC: 0.64 MG/DL (ref 0.5–0.9)
DIFFERENTIAL TYPE: ABNORMAL
EOSINOPHILS RELATIVE PERCENT: 2 % (ref 1–4)
GFR AFRICAN AMERICAN: >60 ML/MIN
GFR NON-AFRICAN AMERICAN: >60 ML/MIN
GFR SERPL CREATININE-BSD FRML MDRD: ABNORMAL ML/MIN/{1.73_M2}
GFR SERPL CREATININE-BSD FRML MDRD: ABNORMAL ML/MIN/{1.73_M2}
GLUCOSE BLD-MCNC: 75 MG/DL (ref 70–99)
HCT VFR BLD CALC: 29.2 % (ref 36.3–47.1)
HEMOGLOBIN: 9.4 G/DL (ref 11.9–15.1)
IMMATURE GRANULOCYTES: 1 %
INR BLD: 1.2
LYMPHOCYTES # BLD: 12 % (ref 24–43)
MCH RBC QN AUTO: 28.4 PG (ref 25.2–33.5)
MCHC RBC AUTO-ENTMCNC: 32.2 G/DL (ref 28.4–34.8)
MCV RBC AUTO: 88.2 FL (ref 82.6–102.9)
MONOCYTES # BLD: 10 % (ref 3–12)
NRBC AUTOMATED: 0 PER 100 WBC
PDW BLD-RTO: 14.6 % (ref 11.8–14.4)
PLATELET # BLD: 284 K/UL (ref 138–453)
PLATELET ESTIMATE: ABNORMAL
PMV BLD AUTO: 9.9 FL (ref 8.1–13.5)
POTASSIUM SERPL-SCNC: 3.9 MMOL/L (ref 3.7–5.3)
PROTHROMBIN TIME: 12.4 SEC (ref 9–12)
RBC # BLD: 3.31 M/UL (ref 3.95–5.11)
RBC # BLD: ABNORMAL 10*6/UL
SEG NEUTROPHILS: 75 % (ref 36–65)
SEGMENTED NEUTROPHILS ABSOLUTE COUNT: 9.21 K/UL (ref 1.5–8.1)
SODIUM BLD-SCNC: 136 MMOL/L (ref 135–144)
SURGICAL PATHOLOGY REPORT: NORMAL
WBC # BLD: 12.4 K/UL (ref 3.5–11.3)
WBC # BLD: ABNORMAL 10*3/UL

## 2019-02-25 PROCEDURE — 76942 ECHO GUIDE FOR BIOPSY: CPT

## 2019-02-25 PROCEDURE — 6360000002 HC RX W HCPCS: Performed by: RADIOLOGY

## 2019-02-25 PROCEDURE — 94640 AIRWAY INHALATION TREATMENT: CPT

## 2019-02-25 PROCEDURE — 6360000002 HC RX W HCPCS: Performed by: STUDENT IN AN ORGANIZED HEALTH CARE EDUCATION/TRAINING PROGRAM

## 2019-02-25 PROCEDURE — 2709999900 HC NON-CHARGEABLE SUPPLY

## 2019-02-25 PROCEDURE — 99232 SBSQ HOSP IP/OBS MODERATE 35: CPT | Performed by: INTERNAL MEDICINE

## 2019-02-25 PROCEDURE — 2580000003 HC RX 258: Performed by: STUDENT IN AN ORGANIZED HEALTH CARE EDUCATION/TRAINING PROGRAM

## 2019-02-25 PROCEDURE — 88307 TISSUE EXAM BY PATHOLOGIST: CPT

## 2019-02-25 PROCEDURE — 36415 COLL VENOUS BLD VENIPUNCTURE: CPT

## 2019-02-25 PROCEDURE — 2709999900 IR BIOPSY LIVER PERCUTANEOUS

## 2019-02-25 PROCEDURE — 85025 COMPLETE CBC W/AUTO DIFF WBC: CPT

## 2019-02-25 PROCEDURE — 99239 HOSP IP/OBS DSCHRG MGMT >30: CPT | Performed by: INTERNAL MEDICINE

## 2019-02-25 PROCEDURE — 2580000003 HC RX 258: Performed by: HOSPITALIST

## 2019-02-25 PROCEDURE — APPNB30 APP NON BILLABLE TIME 0-30 MINS: Performed by: INTERNAL MEDICINE

## 2019-02-25 PROCEDURE — 0FB23ZX EXCISION OF LEFT LOBE LIVER, PERCUTANEOUS APPROACH, DIAGNOSTIC: ICD-10-PCS | Performed by: RADIOLOGY

## 2019-02-25 PROCEDURE — 6360000002 HC RX W HCPCS: Performed by: INTERNAL MEDICINE

## 2019-02-25 PROCEDURE — 97535 SELF CARE MNGMENT TRAINING: CPT

## 2019-02-25 PROCEDURE — 6370000000 HC RX 637 (ALT 250 FOR IP): Performed by: HOSPITALIST

## 2019-02-25 PROCEDURE — 88333 PATH CONSLTJ SURG CYTO XM 1: CPT

## 2019-02-25 PROCEDURE — 47000 NEEDLE BIOPSY OF LIVER PERQ: CPT

## 2019-02-25 PROCEDURE — C9113 INJ PANTOPRAZOLE SODIUM, VIA: HCPCS | Performed by: STUDENT IN AN ORGANIZED HEALTH CARE EDUCATION/TRAINING PROGRAM

## 2019-02-25 PROCEDURE — 85610 PROTHROMBIN TIME: CPT

## 2019-02-25 PROCEDURE — 80048 BASIC METABOLIC PNL TOTAL CA: CPT

## 2019-02-25 PROCEDURE — 1200000000 HC SEMI PRIVATE

## 2019-02-25 RX ORDER — FENTANYL CITRATE 50 UG/ML
INJECTION, SOLUTION INTRAMUSCULAR; INTRAVENOUS
Status: COMPLETED | OUTPATIENT
Start: 2019-02-25 | End: 2019-02-25

## 2019-02-25 RX ADMIN — MORPHINE SULFATE 2 MG: 2 INJECTION, SOLUTION INTRAMUSCULAR; INTRAVENOUS at 14:46

## 2019-02-25 RX ADMIN — MORPHINE SULFATE 2 MG: 2 INJECTION, SOLUTION INTRAMUSCULAR; INTRAVENOUS at 18:57

## 2019-02-25 RX ADMIN — PANTOPRAZOLE SODIUM 40 MG: 40 INJECTION, POWDER, FOR SOLUTION INTRAVENOUS at 22:24

## 2019-02-25 RX ADMIN — PANTOPRAZOLE SODIUM 40 MG: 40 INJECTION, POWDER, FOR SOLUTION INTRAVENOUS at 08:50

## 2019-02-25 RX ADMIN — DESMOPRESSIN ACETATE 40 MG: 0.2 TABLET ORAL at 22:24

## 2019-02-25 RX ADMIN — CROMOLYN SODIUM 1 DROP: 40 SOLUTION/ DROPS OPHTHALMIC at 22:25

## 2019-02-25 RX ADMIN — Medication 10 ML: at 21:00

## 2019-02-25 RX ADMIN — MORPHINE SULFATE 2 MG: 2 INJECTION, SOLUTION INTRAMUSCULAR; INTRAVENOUS at 11:50

## 2019-02-25 RX ADMIN — MORPHINE SULFATE 2 MG: 2 INJECTION, SOLUTION INTRAMUSCULAR; INTRAVENOUS at 00:17

## 2019-02-25 RX ADMIN — CROMOLYN SODIUM 1 DROP: 40 SOLUTION/ DROPS OPHTHALMIC at 08:51

## 2019-02-25 RX ADMIN — Medication 10 ML: at 08:18

## 2019-02-25 RX ADMIN — FENTANYL CITRATE 50 MCG: 50 INJECTION INTRAMUSCULAR; INTRAVENOUS at 13:24

## 2019-02-25 RX ADMIN — MOMETASONE FUROATE AND FORMOTEROL FUMARATE DIHYDRATE 2 PUFF: 200; 5 AEROSOL RESPIRATORY (INHALATION) at 20:24

## 2019-02-25 RX ADMIN — Medication 10 ML: at 08:51

## 2019-02-25 RX ADMIN — Medication 10 ML: at 22:24

## 2019-02-25 RX ADMIN — CROMOLYN SODIUM 1 DROP: 40 SOLUTION/ DROPS OPHTHALMIC at 14:47

## 2019-02-25 RX ADMIN — MORPHINE SULFATE 2 MG: 2 INJECTION, SOLUTION INTRAMUSCULAR; INTRAVENOUS at 07:46

## 2019-02-25 RX ADMIN — MORPHINE SULFATE 2 MG: 2 INJECTION, SOLUTION INTRAMUSCULAR; INTRAVENOUS at 22:24

## 2019-02-25 RX ADMIN — MOMETASONE FUROATE AND FORMOTEROL FUMARATE DIHYDRATE 2 PUFF: 200; 5 AEROSOL RESPIRATORY (INHALATION) at 07:49

## 2019-02-25 RX ADMIN — FENTANYL CITRATE 50 MCG: 50 INJECTION INTRAMUSCULAR; INTRAVENOUS at 13:47

## 2019-02-25 ASSESSMENT — PAIN SCALES - GENERAL
PAINLEVEL_OUTOF10: 7
PAINLEVEL_OUTOF10: 8
PAINLEVEL_OUTOF10: 3
PAINLEVEL_OUTOF10: 3
PAINLEVEL_OUTOF10: 7
PAINLEVEL_OUTOF10: 7
PAINLEVEL_OUTOF10: 2
PAINLEVEL_OUTOF10: 8
PAINLEVEL_OUTOF10: 3
PAINLEVEL_OUTOF10: 2

## 2019-02-25 ASSESSMENT — PAIN DESCRIPTION - LOCATION
LOCATION: ABDOMEN
LOCATION: ABDOMEN

## 2019-02-25 ASSESSMENT — PAIN DESCRIPTION - PAIN TYPE
TYPE: ACUTE PAIN
TYPE: ACUTE PAIN

## 2019-02-26 ENCOUNTER — TELEPHONE (OUTPATIENT)
Dept: CASE MANAGEMENT | Age: 74
End: 2019-02-26

## 2019-02-26 ENCOUNTER — APPOINTMENT (OUTPATIENT)
Dept: GENERAL RADIOLOGY | Age: 74
DRG: 375 | End: 2019-02-26
Payer: COMMERCIAL

## 2019-02-26 VITALS
TEMPERATURE: 97.8 F | RESPIRATION RATE: 16 BRPM | HEART RATE: 68 BPM | BODY MASS INDEX: 32.6 KG/M2 | DIASTOLIC BLOOD PRESSURE: 55 MMHG | SYSTOLIC BLOOD PRESSURE: 100 MMHG | WEIGHT: 184 LBS | HEIGHT: 63 IN | OXYGEN SATURATION: 97 %

## 2019-02-26 LAB
ABSOLUTE EOS #: 0.42 K/UL (ref 0–0.44)
ABSOLUTE IMMATURE GRANULOCYTE: 0.08 K/UL (ref 0–0.3)
ABSOLUTE LYMPH #: 2.24 K/UL (ref 1.1–3.7)
ABSOLUTE MONO #: 1.21 K/UL (ref 0.1–1.2)
ANION GAP SERPL CALCULATED.3IONS-SCNC: 12 MMOL/L (ref 9–17)
BASOPHILS # BLD: 0 % (ref 0–2)
BASOPHILS ABSOLUTE: 0.03 K/UL (ref 0–0.2)
BUN BLDV-MCNC: 7 MG/DL (ref 8–23)
BUN/CREAT BLD: ABNORMAL (ref 9–20)
CALCIUM SERPL-MCNC: 8.9 MG/DL (ref 8.6–10.4)
CHLORIDE BLD-SCNC: 105 MMOL/L (ref 98–107)
CO2: 22 MMOL/L (ref 20–31)
CREAT SERPL-MCNC: 0.86 MG/DL (ref 0.5–0.9)
DIFFERENTIAL TYPE: ABNORMAL
EOSINOPHILS RELATIVE PERCENT: 3 % (ref 1–4)
GFR AFRICAN AMERICAN: >60 ML/MIN
GFR NON-AFRICAN AMERICAN: >60 ML/MIN
GFR SERPL CREATININE-BSD FRML MDRD: ABNORMAL ML/MIN/{1.73_M2}
GFR SERPL CREATININE-BSD FRML MDRD: ABNORMAL ML/MIN/{1.73_M2}
GLUCOSE BLD-MCNC: 93 MG/DL (ref 70–99)
HCT VFR BLD CALC: 28.9 % (ref 36.3–47.1)
HEMOGLOBIN: 9.3 G/DL (ref 11.9–15.1)
IMMATURE GRANULOCYTES: 1 %
LYMPHOCYTES # BLD: 18 % (ref 24–43)
MCH RBC QN AUTO: 27.8 PG (ref 25.2–33.5)
MCHC RBC AUTO-ENTMCNC: 32.2 G/DL (ref 28.4–34.8)
MCV RBC AUTO: 86.3 FL (ref 82.6–102.9)
MONOCYTES # BLD: 10 % (ref 3–12)
NRBC AUTOMATED: 0 PER 100 WBC
PDW BLD-RTO: 14.7 % (ref 11.8–14.4)
PLATELET # BLD: 312 K/UL (ref 138–453)
PLATELET ESTIMATE: ABNORMAL
PMV BLD AUTO: 10.2 FL (ref 8.1–13.5)
POTASSIUM SERPL-SCNC: 3.6 MMOL/L (ref 3.7–5.3)
RBC # BLD: 3.35 M/UL (ref 3.95–5.11)
RBC # BLD: ABNORMAL 10*6/UL
SEG NEUTROPHILS: 68 % (ref 36–65)
SEGMENTED NEUTROPHILS ABSOLUTE COUNT: 8.41 K/UL (ref 1.5–8.1)
SODIUM BLD-SCNC: 139 MMOL/L (ref 135–144)
SURGICAL PATHOLOGY REPORT: NORMAL
WBC # BLD: 12.4 K/UL (ref 3.5–11.3)
WBC # BLD: ABNORMAL 10*3/UL

## 2019-02-26 PROCEDURE — 2580000003 HC RX 258: Performed by: STUDENT IN AN ORGANIZED HEALTH CARE EDUCATION/TRAINING PROGRAM

## 2019-02-26 PROCEDURE — 6360000002 HC RX W HCPCS: Performed by: INTERNAL MEDICINE

## 2019-02-26 PROCEDURE — 85025 COMPLETE CBC W/AUTO DIFF WBC: CPT

## 2019-02-26 PROCEDURE — 97110 THERAPEUTIC EXERCISES: CPT

## 2019-02-26 PROCEDURE — 94760 N-INVAS EAR/PLS OXIMETRY 1: CPT

## 2019-02-26 PROCEDURE — 99239 HOSP IP/OBS DSCHRG MGMT >30: CPT | Performed by: INTERNAL MEDICINE

## 2019-02-26 PROCEDURE — 99232 SBSQ HOSP IP/OBS MODERATE 35: CPT | Performed by: INTERNAL MEDICINE

## 2019-02-26 PROCEDURE — C9113 INJ PANTOPRAZOLE SODIUM, VIA: HCPCS | Performed by: STUDENT IN AN ORGANIZED HEALTH CARE EDUCATION/TRAINING PROGRAM

## 2019-02-26 PROCEDURE — 2580000003 HC RX 258: Performed by: HOSPITALIST

## 2019-02-26 PROCEDURE — 71046 X-RAY EXAM CHEST 2 VIEWS: CPT

## 2019-02-26 PROCEDURE — 6370000000 HC RX 637 (ALT 250 FOR IP): Performed by: HOSPITALIST

## 2019-02-26 PROCEDURE — 6360000002 HC RX W HCPCS: Performed by: STUDENT IN AN ORGANIZED HEALTH CARE EDUCATION/TRAINING PROGRAM

## 2019-02-26 PROCEDURE — 6370000000 HC RX 637 (ALT 250 FOR IP): Performed by: INTERNAL MEDICINE

## 2019-02-26 PROCEDURE — 36415 COLL VENOUS BLD VENIPUNCTURE: CPT

## 2019-02-26 PROCEDURE — APPNB45 APP NON BILLABLE 31-45 MINUTES: Performed by: INTERNAL MEDICINE

## 2019-02-26 PROCEDURE — 97116 GAIT TRAINING THERAPY: CPT

## 2019-02-26 PROCEDURE — 80048 BASIC METABOLIC PNL TOTAL CA: CPT

## 2019-02-26 PROCEDURE — 94640 AIRWAY INHALATION TREATMENT: CPT

## 2019-02-26 RX ORDER — PANTOPRAZOLE SODIUM 40 MG/1
40 TABLET, DELAYED RELEASE ORAL DAILY
Qty: 7 TABLET | Refills: 0 | Status: ON HOLD | OUTPATIENT
Start: 2019-02-26 | End: 2019-04-13

## 2019-02-26 RX ORDER — SENNA AND DOCUSATE SODIUM 50; 8.6 MG/1; MG/1
2 TABLET, FILM COATED ORAL DAILY
Status: DISCONTINUED | OUTPATIENT
Start: 2019-02-26 | End: 2019-02-26 | Stop reason: HOSPADM

## 2019-02-26 RX ORDER — PANTOPRAZOLE SODIUM 40 MG/1
40 TABLET, DELAYED RELEASE ORAL
Status: DISCONTINUED | OUTPATIENT
Start: 2019-02-26 | End: 2019-02-26 | Stop reason: HOSPADM

## 2019-02-26 RX ORDER — OXYCODONE HYDROCHLORIDE AND ACETAMINOPHEN 5; 325 MG/1; MG/1
1 TABLET ORAL EVERY 6 HOURS PRN
Qty: 20 TABLET | Refills: 0 | Status: SHIPPED | OUTPATIENT
Start: 2019-02-26 | End: 2019-03-05

## 2019-02-26 RX ADMIN — AMLODIPINE BESYLATE 5 MG: 5 TABLET ORAL at 09:26

## 2019-02-26 RX ADMIN — MORPHINE SULFATE 2 MG: 2 INJECTION, SOLUTION INTRAMUSCULAR; INTRAVENOUS at 11:49

## 2019-02-26 RX ADMIN — Medication 10 ML: at 09:32

## 2019-02-26 RX ADMIN — ENOXAPARIN SODIUM 30 MG: 30 INJECTION SUBCUTANEOUS at 09:26

## 2019-02-26 RX ADMIN — MORPHINE SULFATE 2 MG: 2 INJECTION, SOLUTION INTRAMUSCULAR; INTRAVENOUS at 03:04

## 2019-02-26 RX ADMIN — PANTOPRAZOLE SODIUM 40 MG: 40 TABLET, DELAYED RELEASE ORAL at 16:17

## 2019-02-26 RX ADMIN — Medication 10 ML: at 09:27

## 2019-02-26 RX ADMIN — MORPHINE SULFATE 2 MG: 2 INJECTION, SOLUTION INTRAMUSCULAR; INTRAVENOUS at 09:25

## 2019-02-26 RX ADMIN — MOMETASONE FUROATE AND FORMOTEROL FUMARATE DIHYDRATE 2 PUFF: 200; 5 AEROSOL RESPIRATORY (INHALATION) at 08:32

## 2019-02-26 RX ADMIN — MORPHINE SULFATE 2 MG: 2 INJECTION, SOLUTION INTRAMUSCULAR; INTRAVENOUS at 06:20

## 2019-02-26 RX ADMIN — DOCUSATE SODIUM 100 MG: 100 CAPSULE, LIQUID FILLED ORAL at 09:33

## 2019-02-26 RX ADMIN — CROMOLYN SODIUM 1 DROP: 40 SOLUTION/ DROPS OPHTHALMIC at 13:12

## 2019-02-26 RX ADMIN — PANTOPRAZOLE SODIUM 40 MG: 40 INJECTION, POWDER, FOR SOLUTION INTRAVENOUS at 09:25

## 2019-02-26 RX ADMIN — CROMOLYN SODIUM 1 DROP: 40 SOLUTION/ DROPS OPHTHALMIC at 09:26

## 2019-02-26 ASSESSMENT — PAIN SCALES - GENERAL
PAINLEVEL_OUTOF10: 7
PAINLEVEL_OUTOF10: 8
PAINLEVEL_OUTOF10: 8
PAINLEVEL_OUTOF10: 9
PAINLEVEL_OUTOF10: 8

## 2019-02-26 ASSESSMENT — PAIN DESCRIPTION - PAIN TYPE: TYPE: ACUTE PAIN

## 2019-02-26 ASSESSMENT — PAIN DESCRIPTION - LOCATION: LOCATION: ABDOMEN

## 2019-02-27 ENCOUNTER — CARE COORDINATION (OUTPATIENT)
Dept: CASE MANAGEMENT | Age: 74
End: 2019-02-27

## 2019-02-27 ENCOUNTER — TELEPHONE (OUTPATIENT)
Dept: GASTROENTEROLOGY | Age: 74
End: 2019-02-27

## 2019-02-27 ENCOUNTER — CLINICAL DOCUMENTATION (OUTPATIENT)
Dept: GASTROENTEROLOGY | Age: 74
End: 2019-02-27

## 2019-02-27 DIAGNOSIS — K31.89 DUODENAL MASS: Primary | ICD-10-CM

## 2019-02-27 PROCEDURE — 1111F DSCHRG MED/CURRENT MED MERGE: CPT | Performed by: STUDENT IN AN ORGANIZED HEALTH CARE EDUCATION/TRAINING PROGRAM

## 2019-03-01 ENCOUNTER — CARE COORDINATION (OUTPATIENT)
Dept: CASE MANAGEMENT | Age: 74
End: 2019-03-01

## 2019-03-04 ENCOUNTER — TELEPHONE (OUTPATIENT)
Dept: CASE MANAGEMENT | Age: 74
End: 2019-03-04

## 2019-03-04 DIAGNOSIS — I10 ESSENTIAL HYPERTENSION: ICD-10-CM

## 2019-03-05 RX ORDER — AMLODIPINE BESYLATE 5 MG/1
TABLET ORAL
Qty: 30 TABLET | Refills: 3 | Status: ON HOLD | OUTPATIENT
Start: 2019-03-05 | End: 2019-04-20 | Stop reason: HOSPADM

## 2019-03-06 ENCOUNTER — CARE COORDINATION (OUTPATIENT)
Dept: CASE MANAGEMENT | Age: 74
End: 2019-03-06

## 2019-03-07 ENCOUNTER — OFFICE VISIT (OUTPATIENT)
Dept: INTERNAL MEDICINE | Age: 74
End: 2019-03-07
Payer: COMMERCIAL

## 2019-03-07 VITALS
DIASTOLIC BLOOD PRESSURE: 55 MMHG | HEART RATE: 83 BPM | HEIGHT: 63 IN | SYSTOLIC BLOOD PRESSURE: 96 MMHG | BODY MASS INDEX: 30.3 KG/M2 | WEIGHT: 171 LBS

## 2019-03-07 DIAGNOSIS — E46 PROTEIN-CALORIE MALNUTRITION, UNSPECIFIED SEVERITY (HCC): ICD-10-CM

## 2019-03-07 DIAGNOSIS — C79.9 METASTATIC ADENOCARCINOMA (HCC): Primary | ICD-10-CM

## 2019-03-07 DIAGNOSIS — K31.89 DUODENAL MASS: ICD-10-CM

## 2019-03-07 PROCEDURE — 99213 OFFICE O/P EST LOW 20 MIN: CPT | Performed by: STUDENT IN AN ORGANIZED HEALTH CARE EDUCATION/TRAINING PROGRAM

## 2019-03-07 PROCEDURE — 99211 OFF/OP EST MAY X REQ PHY/QHP: CPT | Performed by: INTERNAL MEDICINE

## 2019-03-07 PROCEDURE — 1111F DSCHRG MED/CURRENT MED MERGE: CPT | Performed by: STUDENT IN AN ORGANIZED HEALTH CARE EDUCATION/TRAINING PROGRAM

## 2019-03-07 RX ORDER — CALORIC SUPPLEMENT
1 LIQUID (ML) ORAL 3 TIMES DAILY
Qty: 90 BOTTLE | Refills: 5 | Status: SHIPPED | OUTPATIENT
Start: 2019-03-07

## 2019-03-07 RX ORDER — CALORIC SUPPLEMENT
1 LIQUID (ML) ORAL 3 TIMES DAILY
Qty: 3 BOTTLE | Refills: 5 | Status: SHIPPED | OUTPATIENT
Start: 2019-03-07 | End: 2019-03-07 | Stop reason: SDUPTHER

## 2019-03-07 ASSESSMENT — PATIENT HEALTH QUESTIONNAIRE - PHQ9
1. LITTLE INTEREST OR PLEASURE IN DOING THINGS: 0
4. FEELING TIRED OR HAVING LITTLE ENERGY: 0
8. MOVING OR SPEAKING SO SLOWLY THAT OTHER PEOPLE COULD HAVE NOTICED. OR THE OPPOSITE, BEING SO FIGETY OR RESTLESS THAT YOU HAVE BEEN MOVING AROUND A LOT MORE THAN USUAL: 0
10. IF YOU CHECKED OFF ANY PROBLEMS, HOW DIFFICULT HAVE THESE PROBLEMS MADE IT FOR YOU TO DO YOUR WORK, TAKE CARE OF THINGS AT HOME, OR GET ALONG WITH OTHER PEOPLE: 0
SUM OF ALL RESPONSES TO PHQ9 QUESTIONS 1 & 2: 0
3. TROUBLE FALLING OR STAYING ASLEEP: 0
2. FEELING DOWN, DEPRESSED OR HOPELESS: 0
7. TROUBLE CONCENTRATING ON THINGS, SUCH AS READING THE NEWSPAPER OR WATCHING TELEVISION: 0
SUM OF ALL RESPONSES TO PHQ QUESTIONS 1-9: 0
9. THOUGHTS THAT YOU WOULD BE BETTER OFF DEAD, OR OF HURTING YOURSELF: 0
5. POOR APPETITE OR OVEREATING: 0
SUM OF ALL RESPONSES TO PHQ QUESTIONS 1-9: 0
6. FEELING BAD ABOUT YOURSELF - OR THAT YOU ARE A FAILURE OR HAVE LET YOURSELF OR YOUR FAMILY DOWN: 0

## 2019-03-07 ASSESSMENT — ENCOUNTER SYMPTOMS
ANAL BLEEDING: 0
CONSTIPATION: 0
COUGH: 0
ABDOMINAL DISTENTION: 0
BLOOD IN STOOL: 0
NAUSEA: 0
CHEST TIGHTNESS: 0
ABDOMINAL PAIN: 1
DIARRHEA: 0
SHORTNESS OF BREATH: 0

## 2019-03-11 ENCOUNTER — CARE COORDINATION (OUTPATIENT)
Dept: CASE MANAGEMENT | Age: 74
End: 2019-03-11

## 2019-03-22 ENCOUNTER — TELEPHONE (OUTPATIENT)
Dept: CASE MANAGEMENT | Age: 74
End: 2019-03-22

## 2019-04-03 DIAGNOSIS — E78.2 MIXED HYPERLIPIDEMIA: ICD-10-CM

## 2019-04-03 NOTE — TELEPHONE ENCOUNTER
Escribe request for atorvastatin    Next Visit Date:  Future Appointments   Date Time Provider Suresh Marvini   6/28/2019 10:35 AM Madelin Rojas MD [address] [facility] Maintenance   Topic Date Due    Shingles Vaccine (2 of 3) 09/28/2018    Potassium monitoring  02/26/2020    Creatinine monitoring  02/26/2020    Breast cancer screen  02/15/2021    Colon cancer screen colonoscopy  11/27/2022    Lipid screen  12/14/2022    DTaP/Tdap/Td vaccine (2 - Td) 05/25/2027    DEXA (modify frequency per FRAX score)  Completed    Flu vaccine  Completed    Pneumococcal 65+ years Vaccine  Completed    Hepatitis C screen  Completed             (applicable per patient's age: Cancer Screenings, Depression Screening, Fall Risk Screening, Immunizations)    Hemoglobin A1C (%)   Date Value   07/17/2018 5.6   03/26/2018 5.8   07/01/2015 5.7     LDL Cholesterol (mg/dL)   Date Value   12/14/2017 51     AST (U/L)   Date Value   02/20/2019 36 (H)     ALT (U/L)   Date Value   02/20/2019 19     BUN (mg/dL)   Date Value   02/26/2019 7 (L)      (goal A1C is < 7)   (goal LDL is <100) need 30-50% reduction from baseline     BP Readings from Last 3 Encounters:   03/07/19 (!) 96/55   02/26/19 (!) 100/55   02/22/19 (!) 84/41    (goal /80)      All Future Testing planned in [facility]:  Lab Frequency Next Occurrence   TSH With Reflex Ft4 Once 78/78/5889   Basic Metabolic Panel Once 72/35/8034            Patient Active Problem List:     Essential hypertension     Mitral valve prolapse     Stage 3 chronic kidney disease (HCC)     Mixed hyperlipidemia     Chronic bronchitis (HCC)     Stopped smoking     Abdominal pain     Duodenal mass     Abnormal findings on diagnostic imaging of digestive system     Hepatic lesion     Ischemic colitis ([address]

## 2019-04-05 RX ORDER — ATORVASTATIN CALCIUM 40 MG/1
TABLET, FILM COATED ORAL
Qty: 30 TABLET | Refills: 2 | Status: SHIPPED | OUTPATIENT
Start: 2019-04-05

## 2019-04-13 ENCOUNTER — APPOINTMENT (OUTPATIENT)
Dept: GENERAL RADIOLOGY | Age: 74
DRG: 683 | End: 2019-04-13
Payer: COMMERCIAL

## 2019-04-13 ENCOUNTER — HOSPITAL ENCOUNTER (INPATIENT)
Age: 74
LOS: 7 days | Discharge: HOME HEALTH | DRG: 683 | End: 2019-04-20
Attending: EMERGENCY MEDICINE | Admitting: FAMILY MEDICINE
Payer: COMMERCIAL

## 2019-04-13 DIAGNOSIS — A41.9 SEPTICEMIA (HCC): Primary | ICD-10-CM

## 2019-04-13 DIAGNOSIS — E86.0 DEHYDRATION: ICD-10-CM

## 2019-04-13 DIAGNOSIS — C78.7 LIVER METASTASIS (HCC): ICD-10-CM

## 2019-04-13 DIAGNOSIS — R10.11 RIGHT UPPER QUADRANT ABDOMINAL PAIN: ICD-10-CM

## 2019-04-13 DIAGNOSIS — I47.29 NSVT (NONSUSTAINED VENTRICULAR TACHYCARDIA): ICD-10-CM

## 2019-04-13 DIAGNOSIS — R11.2 NAUSEA AND VOMITING, INTRACTABILITY OF VOMITING NOT SPECIFIED, UNSPECIFIED VOMITING TYPE: ICD-10-CM

## 2019-04-13 DIAGNOSIS — C17.0 DUODENAL ADENOCARCINOMA (HCC): ICD-10-CM

## 2019-04-13 PROBLEM — E87.20 LACTIC ACID ACIDOSIS: Status: ACTIVE | Noted: 2019-04-13

## 2019-04-13 PROBLEM — R63.0 LOSS OF APPETITE: Status: ACTIVE | Noted: 2019-04-13

## 2019-04-13 PROBLEM — E44.0 MODERATE MALNUTRITION (HCC): Status: ACTIVE | Noted: 2019-04-13

## 2019-04-13 PROBLEM — R65.10 SIRS (SYSTEMIC INFLAMMATORY RESPONSE SYNDROME) (HCC): Status: ACTIVE | Noted: 2019-04-13

## 2019-04-13 LAB
-: ABNORMAL
ABSOLUTE EOS #: 0.13 K/UL (ref 0–0.44)
ABSOLUTE IMMATURE GRANULOCYTE: 0.34 K/UL (ref 0–0.3)
ABSOLUTE LYMPH #: 3.54 K/UL (ref 1.1–3.7)
ABSOLUTE MONO #: 1.09 K/UL (ref 0.1–1.2)
ALBUMIN SERPL-MCNC: 2.8 G/DL (ref 3.5–5.2)
ALBUMIN/GLOBULIN RATIO: 0.6 (ref 1–2.5)
ALLEN TEST: ABNORMAL
ALP BLD-CCNC: 668 U/L (ref 35–104)
ALT SERPL-CCNC: 58 U/L (ref 5–33)
AMORPHOUS: ABNORMAL
ANION GAP SERPL CALCULATED.3IONS-SCNC: 20 MMOL/L (ref 9–17)
ANION GAP: 12 MMOL/L (ref 7–16)
AST SERPL-CCNC: 223 U/L
BACTERIA: ABNORMAL
BASOPHILS # BLD: 0 % (ref 0–2)
BASOPHILS ABSOLUTE: 0.08 K/UL (ref 0–0.2)
BILIRUB SERPL-MCNC: 0.9 MG/DL (ref 0.3–1.2)
BILIRUBIN URINE: NEGATIVE
BUN BLDV-MCNC: 27 MG/DL (ref 8–23)
BUN/CREAT BLD: ABNORMAL (ref 9–20)
CALCIUM SERPL-MCNC: 9.6 MG/DL (ref 8.6–10.4)
CASTS UA: ABNORMAL /LPF (ref 0–8)
CHLORIDE BLD-SCNC: 99 MMOL/L (ref 98–107)
CO2: 23 MMOL/L (ref 20–31)
COLOR: YELLOW
COMMENT UA: ABNORMAL
CREAT SERPL-MCNC: 1.2 MG/DL (ref 0.5–0.9)
CRYSTALS, UA: ABNORMAL /HPF
DIFFERENTIAL TYPE: ABNORMAL
EOSINOPHILS RELATIVE PERCENT: 1 % (ref 1–4)
EPITHELIAL CELLS UA: ABNORMAL /HPF (ref 0–5)
FIO2: ABNORMAL
GFR AFRICAN AMERICAN: 54 ML/MIN
GFR NON-AFRICAN AMERICAN: 43 ML/MIN
GFR NON-AFRICAN AMERICAN: 44 ML/MIN
GFR SERPL CREATININE-BSD FRML MDRD: 52 ML/MIN
GFR SERPL CREATININE-BSD FRML MDRD: ABNORMAL ML/MIN/{1.73_M2}
GLUCOSE BLD-MCNC: 100 MG/DL (ref 74–100)
GLUCOSE BLD-MCNC: 106 MG/DL (ref 70–99)
GLUCOSE URINE: NEGATIVE
HCO3 VENOUS: 28.6 MMOL/L (ref 22–29)
HCT VFR BLD CALC: 29.9 % (ref 36.3–47.1)
HEMOGLOBIN: 8.8 G/DL (ref 11.9–15.1)
IMMATURE GRANULOCYTES: 2 %
INR BLD: 1.2
KETONES, URINE: ABNORMAL
LACTIC ACID, SEPSIS WHOLE BLOOD: 1.5 MMOL/L (ref 0.5–1.9)
LACTIC ACID, SEPSIS WHOLE BLOOD: 3.3 MMOL/L (ref 0.5–1.9)
LACTIC ACID, SEPSIS: ABNORMAL MMOL/L (ref 0.5–1.9)
LACTIC ACID, SEPSIS: NORMAL MMOL/L (ref 0.5–1.9)
LACTIC ACID, WHOLE BLOOD: 2.3 MMOL/L (ref 0.7–2.1)
LEUKOCYTE ESTERASE, URINE: NEGATIVE
LIPASE: 22 U/L (ref 13–60)
LYMPHOCYTES # BLD: 19 % (ref 24–43)
MCH RBC QN AUTO: 24.4 PG (ref 25.2–33.5)
MCHC RBC AUTO-ENTMCNC: 29.4 G/DL (ref 28.4–34.8)
MCV RBC AUTO: 83.1 FL (ref 82.6–102.9)
MODE: ABNORMAL
MONOCYTES # BLD: 6 % (ref 3–12)
MUCUS: ABNORMAL
NEGATIVE BASE EXCESS, VEN: ABNORMAL (ref 0–2)
NITRITE, URINE: NEGATIVE
NRBC AUTOMATED: 0.6 PER 100 WBC
O2 DEVICE/FLOW/%: ABNORMAL
O2 SAT, VEN: 12 % (ref 60–85)
OTHER OBSERVATIONS UA: ABNORMAL
PARTIAL THROMBOPLASTIN TIME: 24.7 SEC (ref 20.5–30.5)
PATIENT TEMP: ABNORMAL
PCO2, VEN: 39.7 MM HG (ref 41–51)
PDW BLD-RTO: 18.1 % (ref 11.8–14.4)
PH UA: 6 (ref 5–8)
PH VENOUS: 7.47 (ref 7.32–7.43)
PLATELET # BLD: 651 K/UL (ref 138–453)
PLATELET ESTIMATE: ABNORMAL
PMV BLD AUTO: 9.4 FL (ref 8.1–13.5)
PO2, VEN: 10.5 MM HG (ref 30–50)
POC CHLORIDE: 103 MMOL/L (ref 98–107)
POC CREATININE: 1.23 MG/DL (ref 0.51–1.19)
POC HEMATOCRIT: 32 % (ref 36–46)
POC HEMOGLOBIN: 10.8 G/DL (ref 12–16)
POC IONIZED CALCIUM: 1.16 MMOL/L (ref 1.15–1.33)
POC LACTIC ACID: 2.53 MMOL/L (ref 0.56–1.39)
POC PCO2 TEMP: ABNORMAL MM HG
POC PH TEMP: ABNORMAL
POC PO2 TEMP: ABNORMAL MM HG
POC POTASSIUM: 3.9 MMOL/L (ref 3.5–4.5)
POC SODIUM: 144 MMOL/L (ref 138–146)
POSITIVE BASE EXCESS, VEN: 4 (ref 0–3)
POTASSIUM SERPL-SCNC: 4 MMOL/L (ref 3.7–5.3)
PROTEIN UA: ABNORMAL
PROTHROMBIN TIME: 12.5 SEC (ref 9–12)
RBC # BLD: 3.6 M/UL (ref 3.95–5.11)
RBC # BLD: ABNORMAL 10*6/UL
RBC UA: ABNORMAL /HPF (ref 0–4)
RENAL EPITHELIAL, UA: ABNORMAL /HPF
SAMPLE SITE: ABNORMAL
SEG NEUTROPHILS: 72 % (ref 36–65)
SEGMENTED NEUTROPHILS ABSOLUTE COUNT: 13.64 K/UL (ref 1.5–8.1)
SODIUM BLD-SCNC: 142 MMOL/L (ref 135–144)
SPECIFIC GRAVITY UA: 1.01 (ref 1–1.03)
TOTAL CO2, VENOUS: 30 MMOL/L (ref 23–30)
TOTAL PROTEIN: 7.4 G/DL (ref 6.4–8.3)
TRICHOMONAS: ABNORMAL
TROPONIN INTERP: ABNORMAL
TROPONIN INTERP: NORMAL
TROPONIN T: ABNORMAL NG/ML
TROPONIN T: NORMAL NG/ML
TROPONIN, HIGH SENSITIVITY: 12 NG/L (ref 0–14)
TROPONIN, HIGH SENSITIVITY: 16 NG/L (ref 0–14)
TURBIDITY: ABNORMAL
URINE HGB: NEGATIVE
UROBILINOGEN, URINE: NORMAL
WBC # BLD: 18.8 K/UL (ref 3.5–11.3)
WBC # BLD: ABNORMAL 10*3/UL
WBC UA: ABNORMAL /HPF (ref 0–5)
YEAST: ABNORMAL

## 2019-04-13 PROCEDURE — 94640 AIRWAY INHALATION TREATMENT: CPT

## 2019-04-13 PROCEDURE — 2580000003 HC RX 258: Performed by: FAMILY MEDICINE

## 2019-04-13 PROCEDURE — 6370000000 HC RX 637 (ALT 250 FOR IP): Performed by: FAMILY MEDICINE

## 2019-04-13 PROCEDURE — 85610 PROTHROMBIN TIME: CPT

## 2019-04-13 PROCEDURE — 84295 ASSAY OF SERUM SODIUM: CPT

## 2019-04-13 PROCEDURE — 30233N1 TRANSFUSION OF NONAUTOLOGOUS RED BLOOD CELLS INTO PERIPHERAL VEIN, PERCUTANEOUS APPROACH: ICD-10-PCS | Performed by: FAMILY MEDICINE

## 2019-04-13 PROCEDURE — 84484 ASSAY OF TROPONIN QUANT: CPT

## 2019-04-13 PROCEDURE — 2500000003 HC RX 250 WO HCPCS: Performed by: FAMILY MEDICINE

## 2019-04-13 PROCEDURE — 82435 ASSAY OF BLOOD CHLORIDE: CPT

## 2019-04-13 PROCEDURE — 93005 ELECTROCARDIOGRAM TRACING: CPT

## 2019-04-13 PROCEDURE — 85730 THROMBOPLASTIN TIME PARTIAL: CPT

## 2019-04-13 PROCEDURE — 82803 BLOOD GASES ANY COMBINATION: CPT

## 2019-04-13 PROCEDURE — 82947 ASSAY GLUCOSE BLOOD QUANT: CPT

## 2019-04-13 PROCEDURE — 71046 X-RAY EXAM CHEST 2 VIEWS: CPT

## 2019-04-13 PROCEDURE — 99223 1ST HOSP IP/OBS HIGH 75: CPT | Performed by: FAMILY MEDICINE

## 2019-04-13 PROCEDURE — 6360000002 HC RX W HCPCS: Performed by: EMERGENCY MEDICINE

## 2019-04-13 PROCEDURE — 81001 URINALYSIS AUTO W/SCOPE: CPT

## 2019-04-13 PROCEDURE — 2580000003 HC RX 258: Performed by: EMERGENCY MEDICINE

## 2019-04-13 PROCEDURE — 1200000000 HC SEMI PRIVATE

## 2019-04-13 PROCEDURE — 80053 COMPREHEN METABOLIC PANEL: CPT

## 2019-04-13 PROCEDURE — 85014 HEMATOCRIT: CPT

## 2019-04-13 PROCEDURE — 84132 ASSAY OF SERUM POTASSIUM: CPT

## 2019-04-13 PROCEDURE — 96376 TX/PRO/DX INJ SAME DRUG ADON: CPT

## 2019-04-13 PROCEDURE — 87040 BLOOD CULTURE FOR BACTERIA: CPT

## 2019-04-13 PROCEDURE — 83605 ASSAY OF LACTIC ACID: CPT

## 2019-04-13 PROCEDURE — 87086 URINE CULTURE/COLONY COUNT: CPT

## 2019-04-13 PROCEDURE — 82565 ASSAY OF CREATININE: CPT

## 2019-04-13 PROCEDURE — 82746 ASSAY OF FOLIC ACID SERUM: CPT

## 2019-04-13 PROCEDURE — 82330 ASSAY OF CALCIUM: CPT

## 2019-04-13 PROCEDURE — 6360000002 HC RX W HCPCS: Performed by: FAMILY MEDICINE

## 2019-04-13 PROCEDURE — 85025 COMPLETE CBC W/AUTO DIFF WBC: CPT

## 2019-04-13 PROCEDURE — 83690 ASSAY OF LIPASE: CPT

## 2019-04-13 PROCEDURE — 96361 HYDRATE IV INFUSION ADD-ON: CPT

## 2019-04-13 PROCEDURE — 99284 EMERGENCY DEPT VISIT MOD MDM: CPT

## 2019-04-13 PROCEDURE — 96375 TX/PRO/DX INJ NEW DRUG ADDON: CPT

## 2019-04-13 PROCEDURE — 82607 VITAMIN B-12: CPT

## 2019-04-13 PROCEDURE — 36415 COLL VENOUS BLD VENIPUNCTURE: CPT

## 2019-04-13 PROCEDURE — 96374 THER/PROPH/DIAG INJ IV PUSH: CPT

## 2019-04-13 RX ORDER — ATORVASTATIN CALCIUM 10 MG/1
10 TABLET, FILM COATED ORAL DAILY
Status: DISCONTINUED | OUTPATIENT
Start: 2019-04-13 | End: 2019-04-20 | Stop reason: HOSPADM

## 2019-04-13 RX ORDER — ALBUTEROL SULFATE 90 UG/1
2 AEROSOL, METERED RESPIRATORY (INHALATION) EVERY 6 HOURS PRN
Status: DISCONTINUED | OUTPATIENT
Start: 2019-04-13 | End: 2019-04-20 | Stop reason: HOSPADM

## 2019-04-13 RX ORDER — CROMOLYN SODIUM 40 MG/ML
1 SOLUTION/ DROPS OPHTHALMIC 4 TIMES DAILY PRN
Status: DISCONTINUED | OUTPATIENT
Start: 2019-04-13 | End: 2019-04-20 | Stop reason: HOSPADM

## 2019-04-13 RX ORDER — OXYCODONE HYDROCHLORIDE AND ACETAMINOPHEN 5; 325 MG/1; MG/1
1 TABLET ORAL EVERY 6 HOURS PRN
COMMUNITY

## 2019-04-13 RX ORDER — CALORIC SUPPLEMENT
1 LIQUID (ML) ORAL 3 TIMES DAILY
Status: DISCONTINUED | OUTPATIENT
Start: 2019-04-13 | End: 2019-04-13 | Stop reason: CLARIF

## 2019-04-13 RX ORDER — FENTANYL CITRATE 50 UG/ML
25 INJECTION, SOLUTION INTRAMUSCULAR; INTRAVENOUS ONCE
Status: COMPLETED | OUTPATIENT
Start: 2019-04-13 | End: 2019-04-13

## 2019-04-13 RX ORDER — 0.9 % SODIUM CHLORIDE 0.9 %
1000 INTRAVENOUS SOLUTION INTRAVENOUS ONCE
Status: COMPLETED | OUTPATIENT
Start: 2019-04-13 | End: 2019-04-13

## 2019-04-13 RX ORDER — AMLODIPINE BESYLATE 5 MG/1
5 TABLET ORAL NIGHTLY
Status: DISCONTINUED | OUTPATIENT
Start: 2019-04-13 | End: 2019-04-20

## 2019-04-13 RX ORDER — ECHINACEA PURPUREA EXTRACT 125 MG
1 TABLET ORAL PRN
Status: DISCONTINUED | OUTPATIENT
Start: 2019-04-13 | End: 2019-04-20 | Stop reason: HOSPADM

## 2019-04-13 RX ORDER — OXYCODONE HYDROCHLORIDE AND ACETAMINOPHEN 5; 325 MG/1; MG/1
1 TABLET ORAL EVERY 4 HOURS PRN
Status: DISCONTINUED | OUTPATIENT
Start: 2019-04-13 | End: 2019-04-20 | Stop reason: HOSPADM

## 2019-04-13 RX ORDER — ONDANSETRON 2 MG/ML
4 INJECTION INTRAMUSCULAR; INTRAVENOUS EVERY 6 HOURS PRN
Status: DISCONTINUED | OUTPATIENT
Start: 2019-04-13 | End: 2019-04-20 | Stop reason: HOSPADM

## 2019-04-13 RX ORDER — SODIUM CHLORIDE 0.9 % (FLUSH) 0.9 %
10 SYRINGE (ML) INJECTION PRN
Status: DISCONTINUED | OUTPATIENT
Start: 2019-04-13 | End: 2019-04-20 | Stop reason: HOSPADM

## 2019-04-13 RX ORDER — OXYCODONE HYDROCHLORIDE AND ACETAMINOPHEN 5; 325 MG/1; MG/1
1 TABLET ORAL EVERY 4 HOURS PRN
Status: ON HOLD | COMMUNITY
End: 2019-04-13

## 2019-04-13 RX ORDER — ACETAMINOPHEN 325 MG/1
650 TABLET ORAL EVERY 4 HOURS PRN
Status: DISCONTINUED | OUTPATIENT
Start: 2019-04-13 | End: 2019-04-20 | Stop reason: HOSPADM

## 2019-04-13 RX ORDER — OXYCODONE HCL 10 MG/1
10 TABLET, FILM COATED, EXTENDED RELEASE ORAL EVERY 12 HOURS SCHEDULED
Status: DISCONTINUED | OUTPATIENT
Start: 2019-04-13 | End: 2019-04-20 | Stop reason: HOSPADM

## 2019-04-13 RX ORDER — ONDANSETRON 2 MG/ML
4 INJECTION INTRAMUSCULAR; INTRAVENOUS ONCE
Status: COMPLETED | OUTPATIENT
Start: 2019-04-13 | End: 2019-04-13

## 2019-04-13 RX ORDER — FLUTICASONE PROPIONATE 50 MCG
1 SPRAY, SUSPENSION (ML) NASAL DAILY
Status: DISCONTINUED | OUTPATIENT
Start: 2019-04-13 | End: 2019-04-20 | Stop reason: HOSPADM

## 2019-04-13 RX ORDER — CETIRIZINE HYDROCHLORIDE 10 MG/1
10 TABLET ORAL DAILY
Status: DISCONTINUED | OUTPATIENT
Start: 2019-04-13 | End: 2019-04-20 | Stop reason: HOSPADM

## 2019-04-13 RX ORDER — SODIUM CHLORIDE 9 MG/ML
INJECTION, SOLUTION INTRAVENOUS CONTINUOUS
Status: DISCONTINUED | OUTPATIENT
Start: 2019-04-13 | End: 2019-04-15

## 2019-04-13 RX ORDER — MEGESTROL ACETATE 40 MG/ML
200 SUSPENSION ORAL DAILY
Status: DISCONTINUED | OUTPATIENT
Start: 2019-04-13 | End: 2019-04-20 | Stop reason: HOSPADM

## 2019-04-13 RX ORDER — PANTOPRAZOLE SODIUM 40 MG/1
40 TABLET, DELAYED RELEASE ORAL DAILY
Status: DISCONTINUED | OUTPATIENT
Start: 2019-04-13 | End: 2019-04-16

## 2019-04-13 RX ORDER — SODIUM CHLORIDE 0.9 % (FLUSH) 0.9 %
10 SYRINGE (ML) INJECTION EVERY 12 HOURS SCHEDULED
Status: DISCONTINUED | OUTPATIENT
Start: 2019-04-13 | End: 2019-04-20 | Stop reason: HOSPADM

## 2019-04-13 RX ADMIN — ONDANSETRON 4 MG: 2 INJECTION INTRAMUSCULAR; INTRAVENOUS at 13:01

## 2019-04-13 RX ADMIN — MEGESTROL ACETATE 200 MG: 40 SUSPENSION ORAL at 20:33

## 2019-04-13 RX ADMIN — FENTANYL CITRATE 25 MCG: 50 INJECTION, SOLUTION INTRAMUSCULAR; INTRAVENOUS at 18:07

## 2019-04-13 RX ADMIN — OXYCODONE HYDROCHLORIDE 10 MG: 10 TABLET, FILM COATED, EXTENDED RELEASE ORAL at 20:34

## 2019-04-13 RX ADMIN — OXYCODONE HYDROCHLORIDE AND ACETAMINOPHEN 1 TABLET: 5; 325 TABLET ORAL at 23:06

## 2019-04-13 RX ADMIN — FENTANYL CITRATE 25 MCG: 50 INJECTION, SOLUTION INTRAMUSCULAR; INTRAVENOUS at 13:01

## 2019-04-13 RX ADMIN — AMLODIPINE BESYLATE 5 MG: 5 TABLET ORAL at 20:33

## 2019-04-13 RX ADMIN — MOMETASONE FUROATE AND FORMOTEROL FUMARATE DIHYDRATE 2 PUFF: 200; 5 AEROSOL RESPIRATORY (INHALATION) at 20:07

## 2019-04-13 RX ADMIN — AZTREONAM 1 G: 1 INJECTION, POWDER, LYOPHILIZED, FOR SOLUTION INTRAMUSCULAR; INTRAVENOUS at 18:26

## 2019-04-13 RX ADMIN — Medication 1250 MG: at 14:43

## 2019-04-13 RX ADMIN — OXYCODONE HYDROCHLORIDE AND ACETAMINOPHEN 1 TABLET: 5; 325 TABLET ORAL at 19:07

## 2019-04-13 RX ADMIN — ONDANSETRON 4 MG: 2 INJECTION INTRAMUSCULAR; INTRAVENOUS at 14:43

## 2019-04-13 RX ADMIN — SODIUM CHLORIDE 1000 ML: 9 INJECTION, SOLUTION INTRAVENOUS at 13:01

## 2019-04-13 RX ADMIN — FENTANYL CITRATE 25 MCG: 50 INJECTION, SOLUTION INTRAMUSCULAR; INTRAVENOUS at 14:42

## 2019-04-13 RX ADMIN — SODIUM CHLORIDE 1000 ML: 9 INJECTION, SOLUTION INTRAVENOUS at 16:39

## 2019-04-13 ASSESSMENT — PAIN SCALES - GENERAL
PAINLEVEL_OUTOF10: 9
PAINLEVEL_OUTOF10: 8
PAINLEVEL_OUTOF10: 6
PAINLEVEL_OUTOF10: 8
PAINLEVEL_OUTOF10: 10
PAINLEVEL_OUTOF10: 8

## 2019-04-13 ASSESSMENT — PAIN DESCRIPTION - PROGRESSION
CLINICAL_PROGRESSION: NOT CHANGED
CLINICAL_PROGRESSION: NOT CHANGED

## 2019-04-13 ASSESSMENT — PAIN DESCRIPTION - LOCATION
LOCATION: ABDOMEN
LOCATION: ABDOMEN

## 2019-04-13 ASSESSMENT — ENCOUNTER SYMPTOMS
SORE THROAT: 0
WHEEZING: 0
DIARRHEA: 0
VOICE CHANGE: 0
SHORTNESS OF BREATH: 0
VOMITING: 1
CONSTIPATION: 0
BLOOD IN STOOL: 0
SINUS PRESSURE: 0
ABDOMINAL PAIN: 1
COUGH: 0
NAUSEA: 1

## 2019-04-13 ASSESSMENT — PAIN DESCRIPTION - DESCRIPTORS: DESCRIPTORS: CONSTANT;SHARP

## 2019-04-13 ASSESSMENT — PAIN DESCRIPTION - ORIENTATION
ORIENTATION: RIGHT;MID
ORIENTATION: RIGHT;MID

## 2019-04-13 ASSESSMENT — PAIN DESCRIPTION - FREQUENCY: FREQUENCY: CONTINUOUS

## 2019-04-13 ASSESSMENT — PAIN DESCRIPTION - PAIN TYPE
TYPE: ACUTE PAIN
TYPE: ACUTE PAIN

## 2019-04-13 NOTE — ED PROVIDER NOTES
[facility]     Emergency Department     Faculty Attestation    I performed a history and physical examination of the patient and discussed management with the resident. I reviewed the residents note and agree with the documented findings including all diagnostic interpretations and plan of care. Any areas of disagreement are noted on the chart. I was personally present for the key portions of any procedures. I have documented in the chart those procedures where I was not present during the key portions. I have reviewed the emergency nurses triage note. I agree with the chief complaint, past medical history, past surgical history, allergies, medications, social and family history as documented unless otherwise noted below. Documentation of the HPI, Physical Exam and Medical Decision Making performed by [email or web address] is based on my personal performance of the HPI, PE and MDM. For Physician Assistant/ Nurse Practitioner cases/documentation I have personally evaluated this patient and have completed at least one if not all key elements of the E/M (history, physical exam, and MDM). Additional findings are as noted. Primary Care Physician: Steve Guerrero MD    History: This is a 72 y.o. female who presents to the Emergency Department with complaint of fatigue, nausea, right upper quadrant pain. 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 No fevers. Has not started chemotherapy yet. Physical:     oral temperature is 98.1 °F (36.7 °C). Her blood pressure is 105/71 and her pulse is 93. Her respiration is 26 and oxygen saturation is 96%.    72 y.o. female no acute distress, appears tired, cardiac exam regular rate and rhythm no murmurs rubs gallops, pulmonary clear bilaterally, mild tachypnea, abdomen soft, there is some mild right upper quadrant tenderness no rebound no guarding no distention.   Port site in the left anterior chest is well healed with no evidence of infection    Impression: Fatigue and nausea    Plan: Septic and abdominal labs, fluids, nausea medication, reassess    EKG Interpretation  EKG Interpretation    Interpreted by emergency department physician    Rhythm: normal sinus   Rate: normal  Axis: normal  Ectopy: premature ventricular contractions (frequent)  Conduction: normal  ST Segments: normal  T Waves: normal  Q Waves: none    EKG  Impression: Frequent PVCs    Beti Ny MD      Interpreted by me    Nora Lizarraga MD  Attending Emergency Physician        Beti Ny MD  04/13/19 [phone]

## 2019-04-13 NOTE — ED NOTES
Pt. Resting on stretcher, NAD, breathing unlabored, IV antibiotics and fluids infusing, full cardiac monitor on, pt reports no needs at this time, will continue to monitor     Grayson Whatley RN  04/13/19 1185

## 2019-04-13 NOTE — ED NOTES
Pt. To room 34 by wheelchair from triage; pt. Complaining of loss of appetite, vomiting, weakness and fatigue; pt. Denies chest pain, shortness of breath and neuro deficits; pt. Was diagnosed with colon cancer in January and has recently had a port placed. Pt has not received chemo or radiation at this time due to feeling generally ill. Pt. Reports feeling this way \"for a while,\" and came in due to limited ADLs.  Pt. Placed in gown, placed on full cardiac monitor, EKG obtained, IV access obtained and labs drawn, no needs expressed at this time, awaiting orders, will continue to monitor     Leslie Drake RN  04/13/19 [id number]

## 2019-04-13 NOTE — PROGRESS NOTES
Pharmacy Note  Vancomycin Consult    Dami Barajas is a 72 y.o. female started on Vancomycin for sepsis; consult received from Dr. Noemi Dey to manage therapy. Also receiving the following antibiotics: aztreonam.    Patient Active Problem List   Diagnosis    Essential hypertension    Mitral valve prolapse    Stage 3 chronic kidney disease (HCC)    Mixed hyperlipidemia    Chronic bronchitis (HCC)    Stopped smoking    Abdominal pain    Duodenal mass    Abnormal findings on diagnostic imaging of digestive system    Hepatic lesion    Ischemic colitis ([facility] [address]    Liver metastasis (HCC)    Duodenal adenocarcinoma (HCC)    Lactic acid acidosis    SIRS (systemic inflammatory response syndrome) (HCC)       Allergies:  Aspirin; Penicillins; and Iv dye [iodides]     Temp max: 36.7    Recent Labs     04/13/19  1303   BUN 27*       Recent Labs     04/13/19  1241 04/13/19  1303   CREATININE 1.23* 1.20*       Recent Labs     04/13/19  1303   WBC 18.8*         Intake/Output Summary (Last 24 hours) at 4/13/2019 1730  Last data filed at 4/13/2019 1638  Gross per 24 hour   Intake 1250 ml   Output --   Net 1250 ml       Culture Date      Source                       Results  4/13                     blood                              Ht Readings from Last 1 Encounters:   03/07/19 5' 3\" (1.6 m)        Wt Readings from Last 1 Encounters:   03/07/19 171 lb (77.6 kg)         There is no height or weight on file to calculate BMI. CrCl cannot be calculated (Unknown ideal weight.). Goal Trough Level: 14-17 mcg/mL    Assessment/Plan:  Will initiate vancomycin 1250 mg IV every 24 hours. Timing of trough level will be determined based on culture results, renal function, and clinical response. Thank you for the consult. Will continue to follow.     Cyrena Nutley PharmD, BCPS 4/13/2019 5:50 PM

## 2019-04-13 NOTE — DISCHARGE INSTR - COC
Continuity of Care Form    Patient Name: Dami Barajas   :  1945  MRN:  [id number]    Admit date:  2019  Discharge date:  ***    Code Status Order: Prior   Advance Directives:     Admitting Physician:  No admitting provider for patient encounter. PCP: Chris Song MD    Discharging Nurse: [facility] Unit/Room#:   Discharging Unit Phone Number: [phone]    Emergency Contact:   Extended Emergency Contact Information  Primary Emergency Contact: Pamela Gonzalez  Address: N/A   Karen Chambers [address] Phone: [phone]  Work Phone: [phone]  Mobile Phone: [phone]  Relation: Other  Hearing or visual needs: None  Other needs: None  Preferred language: English   needed?  No    Past Surgical History:  Past Surgical History:   Procedure Laterality Date    HYSTERECTOMY      PANCREAS SURGERY      SUSAN AND BSO      UPPER GASTROINTESTINAL ENDOSCOPY N/A 2019    EGD BIOPSY performed by Jeanne Mcgraw MD at [address] [place] [address] ENDOSCOPY  2019    EGD CONTROL HEMORRHAGE performed by Jeanne Mcgraw MD at [address] [place] [address] N/A 2019    EGD BIOPSY performed by Jeanne Mcgraw MD at [facility] Endoscopy       Immunization History:   Immunization History   Administered Date(s) Administered    Influenza Virus Vaccine 10/07/2014, 10/19/2015    Influenza, Zoraida Gamma, 3 Years and older, IM (Fluzone 3 yrs and older or Afluria 5 yrs and older) 2016, 2018    Pneumococcal 13-valent Conjugate ([id number]) 2015    Pneumococcal Polysaccharide ([id number]) 10/07/2014    Tdap (Boostrix, Adacel) 2017    Zoster Live (Zostavax) 2018       Active Problems:  Patient Active Problem List   Diagnosis Code    Essential hypertension I10    Mitral valve prolapse I34.1    Stage 3 chronic kidney disease (HCC) N18.3    Mixed hyperlipidemia E78.2    Chronic bronchitis ([facility] [address] J42    Stopped smoking P10.568    Abdominal pain R10.9    Duodenal mass K31.89    Abnormal findings on diagnostic imaging of digestive system R93.3    Hepatic lesion K76.9    Ischemic colitis (HCC) K55.9    Liver metastasis (HCC) C78.7    Duodenal adenocarcinoma (HCC) C17.0    Lactic acid acidosis E87.2    SIRS (systemic inflammatory response syndrome) (HCC) R65.10       Isolation/Infection:   Isolation          No Isolation            Nurse Assessment:  Last Vital Signs: /71   Pulse 93   Temp 98.1 °F (36.7 °C) (Oral)   Resp 26   SpO2 96%     Last documented pain score (0-10 scale): Pain Level: 9  Last Weight:   Wt Readings from Last 1 Encounters:   03/07/19 171 lb (77.6 kg)     Mental Status:  oriented and alert    IV Access:  - None    Nursing Mobility/ADLs:  Walking   Independent  Transfer  Independent  Bathing  independent  Dressing  independent  [address] Delivery   whole    Wound Care Documentation and Therapy:        Elimination:  Continence:   · Bowel: Yes  · Bladder: Yes  Urinary Catheter: None   Colostomy/Ileostomy/Ileal Conduit: No       Date of Last BM: ***  No intake or output data in the 24 hours ending 04/13/19 1638  No intake/output data recorded. Safety Concerns:     History of Falls (last 30 days) and At Risk for Falls    Impairments/Disabilities:      None    Nutrition Therapy:  Current Nutrition Therapy:   - Oral Diet:  General    Routes of Feeding: Oral  Liquids: No Restrictions  Daily Fluid Restriction: no  Last Modified Barium Swallow with Video (Video Swallowing Test): not done    Treatments at the Time of Hospital Discharge:   Respiratory Treatments: none  Oxygen Therapy:  is not on home oxygen therapy.   Ventilator:    - No ventilator support    Rehab Therapies: Physical Therapy and Occupational Therapy  Weight Bearing Status/Restrictions: No weight bearing restirctions  Other Medical Equipment (for information only, NOT a DME order):  walker  Other Treatments: skilled nursing assessment    Patient's personal belongings (please select all that are sent with patient):  Glasses    RN SIGNATURE:  Electronically signed by Vera Rangel RN on 4/20/19 at 11:05 AM    CASE MANAGEMENT/SOCIAL WORK SECTION    Inpatient Status Date: ***    Readmission Risk Assessment Score:  Readmission Risk              Risk of Unplanned Readmission:        0           Discharging to Facility/ Agency   · Name: [address] [place] [address] [facility] [address]        Phone: [phone]       Fax: [phone]          Dialysis Facility (if applicable)   · Name:  · Address:  · Dialysis Schedule:  · Phone:  · Fax:    / signature: Electronically signed by Rakesh Smyth RN on 4/19/19 at 4:34 PM    PHYSICIAN SECTION    Prognosis: guarded    Condition at Discharge: Stable    Rehab Potential (if transferring to Rehab): Guarded    Recommended Labs or Other Treatments After Discharge:     Physician Certification: I certify the above information and transfer of Eric Segundo  is necessary for the continuing treatment of the diagnosis listed and that she requires Home Care for greater 30 days.      Update Admission H&P: No change in H&P    PHYSICIAN SIGNATURE:  Electronically signed by Selina Quiñonez MD on 4/20/19 at 11:06 AM

## 2019-04-13 NOTE — PLAN OF CARE
Problem: Falls - Risk of:  Goal: Will remain free from falls  Description  Will remain free from falls  Outcome: Met This Shift  Goal: Absence of physical injury  Description  Absence of physical injury  Outcome: Met This Shift     Problem: Discharge Planning:  Goal: Discharged to appropriate level of care  Description  Discharged to appropriate level of care  Outcome: Ongoing     Problem: Gas Exchange - Impaired:  Goal: Levels of oxygenation will improve  Description  Levels of oxygenation will improve  Outcome: Ongoing     Problem: Tissue Perfusion, Altered:  Goal: Circulatory function within specified parameters  Description  Circulatory function within specified parameters  Outcome: Ongoing     Problem: Pain:  Goal: Pain level will decrease  Description  Pain level will decrease  Outcome: Ongoing  Goal: Control of acute pain  Description  Control of acute pain  Outcome: Ongoing  Goal: Control of chronic pain  Description  Control of chronic pain  Outcome: Ongoing

## 2019-04-13 NOTE — ED PROVIDER NOTES
pantoprazole (PROTONIX) 40 MG tablet Take 1 tablet by mouth daily for 7 days 2/26/19 3/5/19  Giuliana Monsivais MD   nicotine polacrilex (RA NICOTINE) 2 MG gum CHEW 1 PIECE OF GUM FOUR TIMES DAILY AS NEEDED FOR SMOKING CESSATION 2/5/19   Ksenia Gonzales MD   valsartan-hydrochlorothiazide (DIOVAN-HCT) 160-25 MG per tablet take 1 tablet by mouth once daily 1/22/19   Stephani Powers MD   albuterol sulfate HFA (PROAIR HFA) 108 (90 Base) MCG/ACT inhaler Inhale 2 puffs into the lungs every 6 hours as needed for Wheezing 10/19/18   Flex Cabral MD   budesonide-formoterol (SYMBICORT) 160-4.5 MCG/ACT AERO Inhale 2 puffs into the lungs 2 times daily 10/19/18   Flex Cabral MD   fluticasone (FLONASE) 50 MCG/ACT nasal spray 1 spray by Nasal route daily 10/19/18   Flex Cabral MD   fexofenadine (ALLEGRA ALLERGY) 60 MG tablet Take 1 tablet by mouth daily 10/19/18   Flex Cabral MD   cromolyn (OPTICROM) 4 % ophthalmic solution Place 1 drop into both eyes 4 times daily 10/19/18   Flex Cabral MD   sodium chloride (ALTAMIST SPRAY) 0.65 % nasal spray 1 spray by Nasal route as needed for Congestion 10/19/18   Flex Cabral MD   zoster recombinant adjuvanted vaccine (SHINGRIX) 50 MCG SUSR injection 50 MCG IM then repeat 2-6 months. 7/17/18 7/17/19  Josh Rushing MD   triamcinolone (KENALOG) 0.1 % cream Apply topically 2 times daily.  6/4/18   Nathaniel Mcdonnell MD       REVIEW OF SYSTEMS    (2-9 systems for level 4, 10 or more for level 5)      Constitutional ROS - No recent fevers, + recent chills  Neurological ROS - No Headache, No Syncope  Opthalmologic ROS- No eye pain, No vision changes   ENT ROS - No sore throat, No congestion  Respiratory ROS - No cough, No shortness of breath  Cardiovascular ROS - No chest pain, No palpitations   Gastrointestinal ROS - + abdominal pain, + nausea, + vomiting  Genito-Urinary ROS - No dysuria, No hematuria  Musculoskeletal ROS - No back pain, No neck pain  Dermatological ROS - No wound, No rash      PHYSICAL EXAM   (up to 7 for level 4, 8 or more for level 5)      VITALS:   /68   Pulse 92   Temp 97.6 °F (36.4 °C) (Oral)   Resp 26   Ht 5' 3\" (1.6 m)   Wt 154 lb 1.6 oz (69.9 kg)   SpO2 99%   BMI 27.30 kg/m²     CONSTITUTIONAL: AOx4, no apparent distress, appears stated age   HEAD: normocephalic, atraumatic   EYES: PERRL, EOMI    ENT: moist mucous membranes, uvula midline   NECK: supple, symmetric   BACK: symmetric   LUNGS: clear to auscultation bilaterally   CARDIOVASCULAR: regular rate and rhythm, no murmurs, rubs or gallops   ABDOMEN: soft, moderate right upper quadrant and epigastric tenderness to palpation, non-distended   : deferred     NEUROLOGIC:  MAEx4, no focal sensory or motor deficits   MUSCULOSKELETAL: no clubbing, cyanosis or edema   SKIN: no exposed rash     DIFFERENTIAL  DIAGNOSIS     DDX: Sepsis, UTI, pneumonia, septicemia, dehydration, metastatic cancer    DIAGNOSTIC RESULTS / EMERGENCY DEPARTMENT COURSE / MDM     LABS:  Results for orders placed or performed during the hospital encounter of 04/13/19   Lactate, Sepsis   Result Value Ref Range    Lactic Acid, Sepsis NOT REPORTED 0.5 - 1.9 mmol/L    Lactic Acid, Sepsis, Whole Blood 3.3 (H) 0.5 - 1.9 mmol/L   Lactate, Sepsis   Result Value Ref Range    Lactic Acid, Sepsis NOT REPORTED 0.5 - 1.9 mmol/L    Lactic Acid, Sepsis, Whole Blood 1.5 0.5 - 1.9 mmol/L   CBC WITH AUTO DIFFERENTIAL   Result Value Ref Range    WBC 18.8 (H) 3.5 - 11.3 k/uL    RBC 3.60 (L) 3.95 - 5.11 m/uL    Hemoglobin 8.8 (L) 11.9 - 15.1 g/dL    Hematocrit 29.9 (L) 36.3 - 47.1 %    MCV 83.1 82.6 - 102.9 fL    MCH 24.4 (L) 25.2 - 33.5 pg    MCHC 29.4 28.4 - 34.8 g/dL    RDW 18.1 (H) 11.8 - 14.4 %    Platelets 269 (H) 220 - 453 k/uL    MPV 9.4 8.1 - 13.5 fL    NRBC Automated 0.6 (H) 0.0 per 100 WBC    Differential Type NOT REPORTED     Seg Neutrophils 72 (H) 36 - 65 %    Lymphocytes 19 (L) 24 - 43 %    Monocytes 6 3 - 12 %    Eosinophils % 1 1 - 4 %    Basophils 0 0 - 2 %    Immature Granulocytes 2 (H) 0 %    Segs Absolute 13.64 (H) 1.50 - 8.10 k/uL    Absolute Lymph # 3.54 1.10 - 3.70 k/uL    Absolute Mono # 1.09 0.10 - 1.20 k/uL    Absolute Eos # 0.13 0.00 - 0.44 k/uL    Basophils # 0.08 0.00 - 0.20 k/uL    Absolute Immature Granulocyte 0.34 (H) 0.00 - 0.30 k/uL    WBC Morphology NOT REPORTED     RBC Morphology ANISOCYTOSIS PRESENT     Platelet Estimate NOT REPORTED    Comprehensive Metabolic Panel   Result Value Ref Range    Glucose 106 (H) 70 - 99 mg/dL    BUN 27 (H) 8 - 23 mg/dL    CREATININE 1.20 (H) 0.50 - 0.90 mg/dL    Bun/Cre Ratio NOT REPORTED 9 - 20    Calcium 9.6 8.6 - 10.4 mg/dL    Sodium 142 135 - 144 mmol/L    Potassium 4.0 3.7 - 5.3 mmol/L    Chloride 99 98 - 107 mmol/L    CO2 23 20 - 31 mmol/L    Anion Gap 20 (H) 9 - 17 mmol/L    Alkaline Phosphatase 668 (H) 35 - 104 U/L    ALT 58 (H) 5 - 33 U/L     (H) <32 U/L    Total Bilirubin 0.90 0.3 - 1.2 mg/dL    Total Protein 7.4 6.4 - 8.3 g/dL    Alb 2.8 (L) 3.5 - 5.2 g/dL    Albumin/Globulin Ratio 0.6 (L) 1.0 - 2.5    GFR Non- 44 (L) >60 mL/min    GFR  54 (L) >60 mL/min    GFR Comment          GFR Staging NOT REPORTED    LIPASE   Result Value Ref Range    Lipase 22 13 - 60 U/L   Troponin   Result Value Ref Range    Troponin, High Sensitivity 16 (H) 0 - 14 ng/L    Troponin T NOT REPORTED <0.03 ng/mL    Troponin Interp NOT REPORTED    Troponin   Result Value Ref Range    Troponin, High Sensitivity 12 0 - 14 ng/L    Troponin T NOT REPORTED <0.03 ng/mL    Troponin Interp NOT REPORTED    Hemoglobin and hematocrit, blood   Result Value Ref Range    POC Hemoglobin 10.8 (L) 12.0 - 16.0 g/dL    POC Hematocrit 32 (L) 36 - 46 %   SODIUM (POC)   Result Value Ref Range    POC Sodium 144 138 - 146 mmol/L   POTASSIUM (POC)   Result Value Ref Range    POC Potassium 3.9 3.5 - 4.5 mmol/L   CHLORIDE (POC)   Result Value Ref Range    POC Chloride 103 98 - 107 mmol/L   CALCIUM, IONIC (POC)   Result Value Ref Range    POC Ionized Calcium 1.16 1.15 - 1.33 mmol/L   Protime-INR   Result Value Ref Range    Protime 12.5 (H) 9.0 - 12.0 sec    INR 1.2    APTT   Result Value Ref Range    PTT 24.7 20.5 - 30.5 sec   Venous Blood Gas, POC   Result Value Ref Range    pH, Yovani 7.466 (H) 7.320 - 7.430    pCO2, Yovani 39.7 (L) 41.0 - 51.0 mm Hg    pO2, Yovani 10.5 (L) 30.0 - 50.0 mm Hg    HCO3, Venous 28.6 22.0 - 29.0 mmol/L    Total CO2, Venous 30 23.0 - 30.0 mmol/L    Negative Base Excess, Yovani NOT REPORTED 0.0 - 2.0    Positive Base Excess, Yovani 4 (H) 0.0 - 3.0    O2 Sat, Yovani 12 (L) 60.0 - 85.0 %    O2 Device/Flow/% NOT REPORTED     Bon Test NOT REPORTED     Sample Site NOT REPORTED     Mode NOT REPORTED     FIO2 NOT REPORTED     Pt Temp NOT REPORTED     POC pH Temp NOT REPORTED     POC pCO2 Temp NOT REPORTED mm Hg    POC pO2 Temp NOT REPORTED mm Hg   Creatinine W/GFR Point of Care   Result Value Ref Range    POC Creatinine 1.23 (H) 0.51 - 1.19 mg/dL    GFR Comment 52 (L) >60 mL/min    GFR Non- 43 (L) >60 mL/min    GFR Comment         Lactic Acid, POC   Result Value Ref Range    POC Lactic Acid 2.53 (H) 0.56 - 1.39 mmol/L   POCT Glucose   Result Value Ref Range    POC Glucose 100 74 - 100 mg/dL   Anion Gap (Calc) POC   Result Value Ref Range    Anion Gap 12 7 - 16 mmol/L   EKG 12 Lead   Result Value Ref Range    Ventricular Rate 98 BPM    Atrial Rate 94 BPM    P-R Interval 134 ms    QRS Duration 86 ms    Q-T Interval 358 ms    QTc Calculation (Bazett) 457 ms    P Axis 61 degrees    R Axis 37 degrees    T Axis 93 degrees       IMPRESSION: 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   Xr Chest Standard (2 Vw)    Result Date: 4/13/2019  EXAMINATION: TWO VIEWS OF THE CHEST 4/13/2019 12:52 pm COMPARISON: 02/26/2019 HISTORY: Ordering Physician Provided Reason for Exam: r/o infectious process FINDINGS: The lungs are without acute focal process. No effusion or pneumothorax. The cardiomediastinal silhouette is normal.  Interval left-sided Port-A-Cath placement. The osseous structures are intact without acute process. No acute process. EKG  Normal sinus rhythm, no acute ST or T-wave abnormalities, occasional PVCs    All EKG's are interpreted by the Emergency Department Physician who either signs or Co-signs this chart in the absence of a cardiologist.    EMERGENCY DEPARTMENT COURSE:  72 y.o. 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 Patient states she's had very decreased by mouth intake. Patient also states unable to take narcotics at home due to nausea and vomiting. Patient did have port placed in left chest wall for chemotherapy to start on 4/17. She found to have elevated white blood cell count and lactate. Patient also with pulse 93 but afebrile. No productive cough. Patient has abdominal pain. Patient with recent abdominal CT so was not repeated. Patient treated as sepsis of unknown origin. Patient was unable to provide urine sample while in the ED. Patient given 2 L of fluid and lactic acidosis resolved. Patient started on vancomycin and aztreonam.  Patient has penicillin allergy. Spoke with [facility] who are agreeable to admit the patient.       Sepsis Times and Checklist  Vital Signs: BP: 130/68  Pulse: 92  Resp: 26  Temp: 97.6 °F (36.4 °C) SpO2: 99 %  SIRS (>2)   Temp > 38.3C or < 36C   HR > 90   RR > 20   WBC > 12 or < 4 or >10% bands  SIRS (>2) and confirmed or suspected source of infection = Sepsis    Sepsis Identified- Unknown Source  Date: 4/13/19  Time: 1418   Sepsis Orders:   CBC: Yes   CMP: Yes   PT/PTT: Yes   Blood Cultures x2: Yes   Urinalysis and Urine Culture: Pending   Lactate: Yes   Broad Spectrum Antibiotics Given (within 3 hours of sepsis identification, after blood cultures): Yes              IV Crystalloid given: Yes    If lactate >2.0 MUST repeat within 6 hours    Is lactate > 4.0:  No  If lactate >4.0 OR hypotension 30ml/kg crystalloid MUST be ordered. Fluids must be completed within 3 hours of sepsis identification.     PLAN (LABS / IMAGING / EKG):  Orders Placed This Encounter   Procedures    Culture Blood #1    Culture Blood #1    XR CHEST STANDARD (2 VW)    Lactate, Sepsis    CBC WITH AUTO DIFFERENTIAL    Comprehensive Metabolic Panel    LIPASE    Troponin    Urinalysis Reflex to Culture    Hemoglobin and hematocrit, blood    SODIUM (POC)    POTASSIUM (POC)    CHLORIDE (POC)    CALCIUM, IONIC (POC)    Protime-INR    APTT    Comprehensive Metabolic Panel w/ Reflex to MG    Lactate, Sepsis    CBC    Protime-INR    Ionized Calcium    Magnesium    Phosphorus    Lactic Acid, Whole Blood    DIET FULL LIQUID;    Vital signs per unit routine    Telemetry monitoring    Notify physician    Up as tolerated    Up with assistance    Daily weights    Intake and output    Tobacco cessation education    Place intermittent pneumatic compression device    Full Code    Inpatient consult to Internal Medicine    Pharmacy to Dose: Vancomycin    OT eval and treat    PT evaluation and treat    Initiate Oxygen Therapy Protocol    POC Blood Gas and Chemistry    Venous Blood Gas, POC    Creatinine W/GFR Point of Care    Lactic Acid, POC    POCT Glucose    Anion Gap (Calc) POC    EKG 12 Lead    PATIENT STATUS (FROM ED OR OR/PROCEDURAL) Inpatient       The patient was given the following medications:  Orders Placed This Encounter   Medications    0.9 % sodium chloride bolus    ondansetron (ZOFRAN) injection 4 mg    fentaNYL (SUBLIMAZE) injection 25 mcg    0.9 % sodium chloride bolus    ondansetron (ZOFRAN) injection 4 mg    fentaNYL (SUBLIMAZE) injection 25 mcg    vancomycin (VANCOCIN) 1250 mg in dextrose 5 % 250 mL IVPB    aztreonam (AZACTAM) 2 g IVPB mini-bag    albuterol sulfate  (90 Base) MCG/ACT inhaler 2 puff    mometasone-formoterol (DULERA) 200-5 MCG/ACT inhaler 2 puff    fluticasone (FLONASE) 50 MCG/ACT nasal spray 1 spray    cetirizine (ZYRTEC) tablet 10 mg    cromolyn (OPTICROM) 4 % ophthalmic solution 1 drop    sodium chloride (OCEAN) 0.65 % nasal spray 1 spray    nicotine polacrilex (NICORETTE) gum 2 mg    pantoprazole (PROTONIX) tablet 40 mg    amLODIPine (NORVASC) tablet 5 mg    DISCONTD: ENSURE ORIGINAL LIQD 1 Can    atorvastatin (LIPITOR) tablet 10 mg    0.9 % sodium chloride infusion    sodium chloride flush 0.9 % injection 10 mL    sodium chloride flush 0.9 % injection 10 mL    enoxaparin (LOVENOX) injection 40 mg     May adjust as needed for renal function.  acetaminophen (TYLENOL) tablet 650 mg    piperacillin-tazobactam (ZOSYN) 3.375 g in dextrose 5 % 50 mL IVPB extended infusion (mini-bag)    DISCONTD: vancomycin (VANCOCIN) 15 mg/kg in dextrose 5 % 250 mL IVPB    vancomycin (VANCOCIN) intermittent dosing (placeholder)    vancomycin (VANCOCIN) 1250 mg in dextrose 5 % 250 mL IVPB        Vitals:    Vitals:    04/13/19 1250 04/13/19 1631 04/13/19 1632 04/13/19 1730   BP:  94/62  130/68   Pulse:  85  92   Resp:  22  26   Temp: 98.1 °F (36.7 °C)   97.6 °F (36.4 °C)   TempSrc: Oral   Oral   SpO2:   99% 99%   Weight:    154 lb 1.6 oz (69.9 kg)   Height:    5' 3\" (1.6 m)     -------------------------  BP: 130/68, Temp: 97.6 °F (36.4 °C), Pulse: 92, Resp: 26    PROCEDURES:  None    CONSULTS:  IP CONSULT TO INTERNAL MEDICINE  PHARMACY TO DOSE VANCOMYCIN    CRITICAL CARE:  None    FINAL IMPRESSION      1. Septicemia ([address]    2. Nausea and vomiting, intractability of vomiting not specified, unspecified vomiting type    3. Dehydration    4. Right upper quadrant abdominal pain    5. Liver metastasis ([facility] [address]    6.  Duodenal adenocarcinoma ([facility] [address]          DISPOSITION / PLAN     DISPOSITION Admitted 04/13/2019 02:49:59 PM      PATIENT REFERRED TO:  Marshal Halsted, MD  [address] [facility] [address]          [address] Daniella Mckee MD  [address] [id number]            DISCHARGE MEDICATIONS:  Current Discharge Medication List          Aj Gage DO  Emergency Medicine Resident    (Please note that portions of this note were completed with a voice recognition program.  Efforts were made to edit the dictations but occasionally words are mis-transcribed.  Whenever words are used in this note in any gender, they shall be construed as though they were used in the gender appropriate to the circumstances; and whenever words are used in this note in the singular or plural form, they shall be construed as though they were used in the form appropriate to the circumstances.)       Aj Gage DO  Resident  04/13/19 2023

## 2019-04-13 NOTE — CARE COORDINATION
Case Management Initial Discharge Plan  Abelardo Gonzales,             Met with:patient or friend to discuss discharge plans. Information verified: address, contacts, phone number, , insurance Yes  PCP: Miranda Panda MD  Date of last visit: 3 weeks ago     Insurance Provider: Amna Garcia    Discharge Planning    Living Arrangements:  Other (Comment)(currently staying with Javier Shock. Dayanara Bonds assessment is based on his home)   Support Systems:  Friends/Neighbors    Patient has been staying with her friend Ninoska Rosas while she has been sick. Address is [address] [place] [address]  Home has 1 stories  0 stairs to climb to get into front door, na stairs to climb to reach second floor  Location of bedroom/bathroom in home first floor     Patient able to perform ADL's:Independent    Current Services (outpatient & in home) none   DME equipment: none   DME provider: none     Pharmacy: Meds to beds    Potential Assistance Purchasing Medications:  No  Does patient want to participate in local refill/ meds to beds program?  Yes    Potential Assistance Needed:  N/A    Patient agreeable to home care: Yes  Freedom of choice provided:  yes, Patient would like [facility] from provided list     Prior SNF/Rehab Placement and Facility: no  Agreeable to SNF/Rehab: No  [place] of choice provided: n/a   Evaluation: no    Expected Discharge date:     Patient expects to be discharged to: Follow Up Appointment: Best Day/ Time:      Transportation provider: friend   Transportation arrangements needed for discharge: No    Readmission Risk              Risk of Unplanned Readmission:        0             Does patient have a readmission risk score greater than 14?: No  If yes, follow-up appointment must be made within 7 days of discharge. Discharge Plan: Home with [facility]. Patient planning on going to friend's home at discharge. [place] [address] [id number].           Electronically signed by Monse Buckley Katharina Carrillo RN on 4/13/19 at 4:33 PM

## 2019-04-13 NOTE — H&P
[facility]      HISTORY AND PHYSICAL EXAMINATION            Date:   4/13/2019  Patient name:  Eva Graff  Date of admission:  4/13/2019 12:21 PM  MRN:   [id number]  Account:  [de-identified]  YOB: 1945  PCP:    Marcelline Claude, MD  Room:   4285/1658-63  Code Status:    Full Code    Chief Complaint:     Chief Complaint   Patient presents with    Fatigue    Emesis       History Obtained From:     Patient, Electronic medical record    History of Present Illness: The patient is a 72 y.o. Non-/non  female who presents with Fatigue and Emesis   and she is admitted to the hospital for the management of  SIRS (systemic inflammatory response syndrome) ([facility] Patient was brought to emergency room by friend for right upper quadrant pain, poor oral intake, loss of appetite, weight loss. 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 Patient had recent port placement and is waiting for chemotherapy. Abdominal pain was severe, right upper quadrant, nonradiating, no worsening or relieving factors. She had poor appetite, easy satiety, nausea persistent along with nonbloody nonbilious emesis. Patient denies any fever, chills, difficulty breathing, wheezing, sputum production, dysuria, diarrhea. She had significant weight loss in last few months. Initial evaluation showed pulse 96, blood pressure 105/71, temperature 98.1. Lab work showed BUN 27, creatinine 1.2, lactic acid 3.3, albumin 2.8, bilirubin 0.9, , ALT 58, alk phos 668, WBC of 18.8, hemoglobin 8.8, platelet 050. Chest x-ray showed no acute cardiac pulmonary process. UA is pending.     Past Medical History:     Past Medical History:   Diagnosis Date    Alcohol abuse 7/20/2012    Duodenal adenocarcinoma ([facility]     Hyperlipidemia 6/9/2015    Hypertension     Liver metastasis ([address]     Liver metastasis ([address]     Pancreatitis     Stone, kidney Past Surgical History:     Past Surgical History:   Procedure Laterality Date    HYSTERECTOMY      PANCREAS SURGERY      SUSAN AND BSO      UPPER GASTROINTESTINAL ENDOSCOPY N/A 2/20/2019    EGD BIOPSY performed by Andra Chino MD at [address] [place] [address]  2/20/2019    EGD CONTROL HEMORRHAGE performed by Andra Chino MD at [address] [place] [address] N/A 2/22/2019    EGD BIOPSY performed by Andra Chino MD at [facility]        Medications Prior to Admission:     Prior to Admission medications    Medication Sig Start Date End Date Taking? Authorizing Provider   oxyCODONE-acetaminophen (PERCOCET) 5-325 MG per tablet Take 1 tablet by mouth every 6 hours as needed for Pain.    Yes Historical Provider, MD   atorvastatin (LIPITOR) 40 MG tablet take 1 tablet by mouth once daily 4/5/19   Al Tomlinson MD   Nutritional Supplements (ENSURE ORIGINAL) LIQD Take 1 Can by mouth 3 times daily 3/7/19   Brandi Guido MD   amLODIPine (NORVASC) 5 MG tablet take 1 tablet by mouth once daily 3/5/19   Al Tomlinson MD   pantoprazole (PROTONIX) 40 MG tablet Take 1 tablet by mouth daily for 7 days 2/26/19 3/5/19  Keyla Lopez MD   nicotine polacrilex (RA NICOTINE) 2 MG gum CHEW 1 PIECE OF GUM FOUR TIMES DAILY AS NEEDED FOR SMOKING CESSATION 2/5/19   Flo Easton MD   valsartan-hydrochlorothiazide (DIOVAN-HCT) 160-25 MG per tablet take 1 tablet by mouth once daily 1/22/19   Al Tomlinson MD   albuterol sulfate HFA (PROAIR HFA) 108 (90 Base) MCG/ACT inhaler Inhale 2 puffs into the lungs every 6 hours as needed for Wheezing 10/19/18   Nina Padilla MD   budesonide-formoterol (SYMBICORT) 160-4.5 MCG/ACT AERO Inhale 2 puffs into the lungs 2 times daily 10/19/18   Nina Padilla MD   fluticasone (FLONASE) 50 MCG/ACT nasal spray 1 spray by Nasal route daily 10/19/18   Nina Padilla MD   fexofenadine (ALLEGRA ALLERGY) 60 MG tablet Take 1 tablet by mouth daily 10/19/18   Radha Amor MD   cromolyn (OPTICROM) 4 % ophthalmic solution Place 1 drop into both eyes 4 times daily 10/19/18   Radha Amor MD   sodium chloride (ALTAMIST SPRAY) 0.65 % nasal spray 1 spray by Nasal route as needed for Congestion 10/19/18   Radha Amor MD   zoster recombinant adjuvanted vaccine (SHINGRIX) 50 MCG SUSR injection 50 MCG IM then repeat 2-6 months. 7/17/18 7/17/19  Yinka Rodriguez MD        Allergies:     Aspirin; Penicillins; and Iv dye [iodides]    Social History:     Tobacco:    reports that she quit smoking about 4 years ago. Her smoking use included cigarettes. She has a 9.00 pack-year smoking history. She has never used smokeless tobacco.  Alcohol:      reports that she drinks about 0.6 oz of alcohol per week. Drug Use:  reports that she does not use drugs. Family History:     History reviewed. No pertinent family history. Review of Systems:     Positive and Negative as described in HPI. Review of Systems   Constitutional: Positive for activity change, appetite change, fatigue and unexpected weight change. Negative for chills and fever. HENT: Negative for congestion, mouth sores, postnasal drip, sinus pressure, sore throat and voice change. Eyes: Negative for visual disturbance. Respiratory: Negative for cough, shortness of breath and wheezing. Cardiovascular: Negative for chest pain and palpitations. Gastrointestinal: Positive for abdominal pain, nausea and vomiting. Negative for blood in stool, constipation and diarrhea. Endocrine: Negative for polyuria. Genitourinary: Negative for difficulty urinating, dysuria, frequency and urgency. Musculoskeletal: Negative for arthralgias, joint swelling and myalgias. Neurological: Negative for dizziness, tremors, speech difficulty, light-headedness and headaches.        Physical Exam:   /68   Pulse 92   Temp 97.6 °F (36.4 °C) (Oral)   Resp 26   Ht 5' 3\" (1.6 m)   Wt 154 lb 1.6 oz (69.9 kg) SpO2 99%   BMI 27.30 kg/m²   Temp (24hrs), Av.9 °F (36.6 °C), Min:97.6 °F (36.4 °C), Max:98.1 °F (36.7 °C)    Recent Labs     19  1241   POCGLU 100       Intake/Output Summary (Last 24 hours) at 2019 1751  Last data filed at 2019 1638  Gross per 24 hour   Intake 1250 ml   Output --   Net 1250 ml     Physical Exam   Constitutional: She is oriented to person, place, and time. She appears well-developed. She appears ill. No distress. HENT:   Mouth/Throat: No oropharyngeal exudate. Eyes: Pupils are equal, round, and reactive to light. Conjunctivae are normal. No scleral icterus. Neck: No JVD present. No thyromegaly present. Cardiovascular: Normal rate, regular rhythm and normal heart sounds. Exam reveals no gallop and no friction rub. No murmur heard. Pulmonary/Chest: Effort normal. No respiratory distress. She has no wheezes. She has no rales. Left sided Port in place    Abdominal: Soft. Bowel sounds are normal. She exhibits no distension and no mass. There is tenderness in the right upper quadrant. There is no rebound and no guarding. Lymphadenopathy:     She has no cervical adenopathy. Neurological: She is alert and oriented to person, place, and time. No cranial nerve deficit. She exhibits normal muscle tone. Skin: She is not diaphoretic. Nursing note and vitals reviewed.       Investigations:      Laboratory Testing:  Recent Results (from the past 24 hour(s))   EKG 12 Lead    Collection Time: 19 12:27 PM   Result Value Ref Range    Ventricular Rate 98 BPM    Atrial Rate 94 BPM    P-R Interval 134 ms    QRS Duration 86 ms    Q-T Interval 358 ms    QTc Calculation (Bazett) 457 ms    P Axis 61 degrees    R Axis 37 degrees    T Axis 93 degrees   Venous Blood Gas, POC    Collection Time: 19 12:41 PM   Result Value Ref Range    pH, Yovani 7.466 (H) 7.320 - 7.430    pCO2, Yovani 39.7 (L) 41.0 - 51.0 mm Hg    pO2, Yovani 10.5 (L) 30.0 - 50.0 mm Hg    HCO3, Venous 28.6 22.0 - 04/13/19  1:03 PM   Result Value Ref Range    WBC 18.8 (H) 3.5 - 11.3 k/uL    RBC 3.60 (L) 3.95 - 5.11 m/uL    Hemoglobin 8.8 (L) 11.9 - 15.1 g/dL    Hematocrit 29.9 (L) 36.3 - 47.1 %    MCV 83.1 82.6 - 102.9 fL    MCH 24.4 (L) 25.2 - 33.5 pg    MCHC 29.4 28.4 - 34.8 g/dL    RDW 18.1 (H) 11.8 - 14.4 %    Platelets 486 (H) 697 - 453 k/uL    MPV 9.4 8.1 - 13.5 fL    NRBC Automated 0.6 (H) 0.0 per 100 WBC    Differential Type NOT REPORTED     Seg Neutrophils 72 (H) 36 - 65 %    Lymphocytes 19 (L) 24 - 43 %    Monocytes 6 3 - 12 %    Eosinophils % 1 1 - 4 %    Basophils 0 0 - 2 %    Immature Granulocytes 2 (H) 0 %    Segs Absolute 13.64 (H) 1.50 - 8.10 k/uL    Absolute Lymph # 3.54 1.10 - 3.70 k/uL    Absolute Mono # 1.09 0.10 - 1.20 k/uL    Absolute Eos # 0.13 0.00 - 0.44 k/uL    Basophils # 0.08 0.00 - 0.20 k/uL    Absolute Immature Granulocyte 0.34 (H) 0.00 - 0.30 k/uL    WBC Morphology NOT REPORTED     RBC Morphology ANISOCYTOSIS PRESENT     Platelet Estimate NOT REPORTED    Comprehensive Metabolic Panel    Collection Time: 04/13/19  1:03 PM   Result Value Ref Range    Glucose 106 (H) 70 - 99 mg/dL    BUN 27 (H) 8 - 23 mg/dL    CREATININE 1.20 (H) 0.50 - 0.90 mg/dL    Bun/Cre Ratio NOT REPORTED 9 - 20    Calcium 9.6 8.6 - 10.4 mg/dL    Sodium 142 135 - 144 mmol/L    Potassium 4.0 3.7 - 5.3 mmol/L    Chloride 99 98 - 107 mmol/L    CO2 23 20 - 31 mmol/L    Anion Gap 20 (H) 9 - 17 mmol/L    Alkaline Phosphatase 668 (H) 35 - 104 U/L    ALT 58 (H) 5 - 33 U/L     (H) <32 U/L    Total Bilirubin 0.90 0.3 - 1.2 mg/dL    Total Protein 7.4 6.4 - 8.3 g/dL    Alb 2.8 (L) 3.5 - 5.2 g/dL    Albumin/Globulin Ratio 0.6 (L) 1.0 - 2.5    GFR Non- 44 (L) >60 mL/min    GFR  54 (L) >60 mL/min    GFR Comment          GFR Staging NOT REPORTED    LIPASE    Collection Time: 04/13/19  1:03 PM   Result Value Ref Range    Lipase 22 13 - 60 U/L   Troponin    Collection Time: 04/13/19  1:03 PM   Result Value Ref Range    Troponin, High Sensitivity 16 (H) 0 - 14 ng/L    Troponin T NOT REPORTED <0.03 ng/mL    Troponin Interp NOT REPORTED    Protime-INR    Collection Time: 04/13/19  1:03 PM   Result Value Ref Range    Protime 12.5 (H) 9.0 - 12.0 sec    INR 1.2    APTT    Collection Time: 04/13/19  1:03 PM   Result Value Ref Range    PTT 24.7 20.5 - 30.5 sec   Troponin    Collection Time: 04/13/19  2:50 PM   Result Value Ref Range    Troponin, High Sensitivity 12 0 - 14 ng/L    Troponin T NOT REPORTED <0.03 ng/mL    Troponin Interp NOT REPORTED    Lactate, Sepsis    Collection Time: 04/13/19  3:55 PM   Result Value Ref Range    Lactic Acid, Sepsis NOT REPORTED 0.5 - 1.9 mmol/L    Lactic Acid, Sepsis, Whole Blood 1.5 0.5 - 1.9 mmol/L       Imaging/Diagonstics:    CXR 4/13/19 - no acute process     CT abdomen pelvis with contrast on 2/19/19  Irregular masslike wall thickening of the duodenum concerning for malignancy. Numerous hypodense mass lesions within the liver, concerning for metastatic   disease.       Hyperdense, presumed necrotic lymph node adjacent to the aorta measuring 3.2   x 1.7 cm.       No bowel obstruction or inflammation.  No free intraperitoneal air or fluid. NM SPECT 6/29/17-no reversible ischemia or infarct.   Assessment :      Primary Problem  SIRS (systemic inflammatory response syndrome) ([facility]    Active Hospital Problems    Diagnosis Date Noted    Lactic acid acidosis [E87.2] 04/13/2019    SIRS (systemic inflammatory response syndrome) (HCC) [R65.10] 04/13/2019    Dehydration [E86.0] 04/13/2019    Moderate malnutrition (HCC) [E44.0] 04/13/2019    Loss of appetite [R63.0] 04/13/2019    Right upper quadrant pain [R10.11] 04/13/2019    Liver metastasis (HCC) [C78.7]     Duodenal adenocarcinoma (HCC) [C17.0]     [facility] [J42] 05/25/2017    Stage 3 chronic kidney disease ([facility] [N18.3] 06/09/2015    Essential hypertension [I10] 01/27/2012    Mitral valve prolapse [I34.1] 01/27/2012       Plan:     Patient status Admit as inpatient in the  Med/Surge    1. SIRS - unlikely sepsis as no focus of action present. Follow urinalysis, urine culture sensitivity, blood culture. Empiric Vanco, Zosyn. Port does not look infected. Symptoms likely secondary to dehydration from poor oral intake and nausea and vomiting. Continue fluid resuscitation. Check right upper quadrant ultrasound given elevated LFT, alk phos. Monitor LFT. 2. Lactic acidosis-continue IV fluid resuscitation. Monitor lactic acid. 3. 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 4. Essential hypertension-hold antihypertensives for now. 5. Chronic bronchitis-stable. Albuterol as needed   6. Loss of appetite-start Megace  7. Moderate protein calorie malnutrition- supplements. Consultations:   IP CONSULT TO INTERNAL MEDICINE  PHARMACY TO DOSE VANCOMYCIN    Patient is admitted as inpatient status because of co-morbidities listed above, severity of signs and symptoms as outlined, requirement for current medical therapies and most importantly because of direct risk to patient if care not provided in a hospital setting.     Colette Milligan MD  4/13/2019    Copy sent to Dr. Al Tomlinson MD    (Please note that portions of this note were completed with a voice recognition program. Efforts were made to edit the dictations but occasionally words are mis-transcribed.)

## 2019-04-14 LAB
ABSOLUTE RETIC #: 0.07 M/UL (ref 0.03–0.08)
ALBUMIN SERPL-MCNC: 2.2 G/DL (ref 3.5–5.2)
ALBUMIN/GLOBULIN RATIO: 0.6 (ref 1–2.5)
ALP BLD-CCNC: 549 U/L (ref 35–104)
ALT SERPL-CCNC: 45 U/L (ref 5–33)
ANION GAP SERPL CALCULATED.3IONS-SCNC: 12 MMOL/L (ref 9–17)
AST SERPL-CCNC: 154 U/L
BILIRUB SERPL-MCNC: 0.79 MG/DL (ref 0.3–1.2)
BUN BLDV-MCNC: 19 MG/DL (ref 8–23)
BUN/CREAT BLD: ABNORMAL (ref 9–20)
CALCIUM IONIZED: 1.11 MMOL/L (ref 1.13–1.33)
CALCIUM SERPL-MCNC: 8.1 MG/DL (ref 8.6–10.4)
CHLORIDE BLD-SCNC: 107 MMOL/L (ref 98–107)
CO2: 22 MMOL/L (ref 20–31)
CREAT SERPL-MCNC: 0.9 MG/DL (ref 0.5–0.9)
CULTURE: NORMAL
FERRITIN: 3965 UG/L (ref 13–150)
FOLATE: 10.9 NG/ML
GFR AFRICAN AMERICAN: >60 ML/MIN
GFR NON-AFRICAN AMERICAN: >60 ML/MIN
GFR SERPL CREATININE-BSD FRML MDRD: ABNORMAL ML/MIN/{1.73_M2}
GFR SERPL CREATININE-BSD FRML MDRD: ABNORMAL ML/MIN/{1.73_M2}
GLUCOSE BLD-MCNC: 107 MG/DL (ref 70–99)
HCT VFR BLD CALC: 24.5 % (ref 36.3–47.1)
HEMOGLOBIN: 7.1 G/DL (ref 11.9–15.1)
IMMATURE RETIC FRACT: 22.4 % (ref 2.7–18.3)
INR BLD: 1.4
IRON SATURATION: 11 % (ref 20–55)
IRON: 15 UG/DL (ref 37–145)
LACTIC ACID, WHOLE BLOOD: 1.4 MMOL/L (ref 0.7–2.1)
Lab: NORMAL
MAGNESIUM: 2.2 MG/DL (ref 1.6–2.6)
MCH RBC QN AUTO: 24.5 PG (ref 25.2–33.5)
MCHC RBC AUTO-ENTMCNC: 29 G/DL (ref 28.4–34.8)
MCV RBC AUTO: 84.5 FL (ref 82.6–102.9)
NRBC AUTOMATED: 0.2 PER 100 WBC
PDW BLD-RTO: 18 % (ref 11.8–14.4)
PHOSPHORUS: 3.1 MG/DL (ref 2.6–4.5)
PLATELET # BLD: 442 K/UL (ref 138–453)
PMV BLD AUTO: 9.2 FL (ref 8.1–13.5)
POTASSIUM SERPL-SCNC: 3.9 MMOL/L (ref 3.7–5.3)
PROTHROMBIN TIME: 14.3 SEC (ref 9–12)
RBC # BLD: 2.9 M/UL (ref 3.95–5.11)
RETIC %: 2.6 % (ref 0.5–1.9)
RETIC HEMOGLOBIN: 23.1 PG (ref 28.2–35.7)
SODIUM BLD-SCNC: 141 MMOL/L (ref 135–144)
SPECIMEN DESCRIPTION: NORMAL
TOTAL IRON BINDING CAPACITY: 142 UG/DL (ref 250–450)
TOTAL PROTEIN: 5.9 G/DL (ref 6.4–8.3)
UNSATURATED IRON BINDING CAPACITY: 127 UG/DL (ref 112–347)
VITAMIN B-12: >2000 PG/ML (ref 232–1245)
WBC # BLD: 16.9 K/UL (ref 3.5–11.3)

## 2019-04-14 PROCEDURE — 2500000003 HC RX 250 WO HCPCS: Performed by: FAMILY MEDICINE

## 2019-04-14 PROCEDURE — 99232 SBSQ HOSP IP/OBS MODERATE 35: CPT | Performed by: FAMILY MEDICINE

## 2019-04-14 PROCEDURE — 97530 THERAPEUTIC ACTIVITIES: CPT

## 2019-04-14 PROCEDURE — 36415 COLL VENOUS BLD VENIPUNCTURE: CPT

## 2019-04-14 PROCEDURE — 83605 ASSAY OF LACTIC ACID: CPT

## 2019-04-14 PROCEDURE — 85610 PROTHROMBIN TIME: CPT

## 2019-04-14 PROCEDURE — 6370000000 HC RX 637 (ALT 250 FOR IP): Performed by: FAMILY MEDICINE

## 2019-04-14 PROCEDURE — 83540 ASSAY OF IRON: CPT

## 2019-04-14 PROCEDURE — 85045 AUTOMATED RETICULOCYTE COUNT: CPT

## 2019-04-14 PROCEDURE — 80053 COMPREHEN METABOLIC PANEL: CPT

## 2019-04-14 PROCEDURE — 1200000000 HC SEMI PRIVATE

## 2019-04-14 PROCEDURE — 83735 ASSAY OF MAGNESIUM: CPT

## 2019-04-14 PROCEDURE — 94760 N-INVAS EAR/PLS OXIMETRY 1: CPT

## 2019-04-14 PROCEDURE — 84100 ASSAY OF PHOSPHORUS: CPT

## 2019-04-14 PROCEDURE — 94640 AIRWAY INHALATION TREATMENT: CPT

## 2019-04-14 PROCEDURE — 97162 PT EVAL MOD COMPLEX 30 MIN: CPT

## 2019-04-14 PROCEDURE — 83550 IRON BINDING TEST: CPT

## 2019-04-14 PROCEDURE — 82330 ASSAY OF CALCIUM: CPT

## 2019-04-14 PROCEDURE — 82728 ASSAY OF FERRITIN: CPT

## 2019-04-14 PROCEDURE — 85027 COMPLETE CBC AUTOMATED: CPT

## 2019-04-14 PROCEDURE — 2580000003 HC RX 258: Performed by: FAMILY MEDICINE

## 2019-04-14 PROCEDURE — 6360000002 HC RX W HCPCS: Performed by: FAMILY MEDICINE

## 2019-04-14 RX ORDER — IRON POLYSACCHARIDE COMPLEX 150 MG
150 CAPSULE ORAL 2 TIMES DAILY
Status: DISCONTINUED | OUTPATIENT
Start: 2019-04-14 | End: 2019-04-20 | Stop reason: HOSPADM

## 2019-04-14 RX ORDER — DOCUSATE SODIUM 100 MG/1
100 CAPSULE, LIQUID FILLED ORAL DAILY
Status: DISCONTINUED | OUTPATIENT
Start: 2019-04-14 | End: 2019-04-20 | Stop reason: HOSPADM

## 2019-04-14 RX ORDER — SENNA PLUS 8.6 MG/1
1 TABLET ORAL NIGHTLY
Status: DISCONTINUED | OUTPATIENT
Start: 2019-04-14 | End: 2019-04-20 | Stop reason: HOSPADM

## 2019-04-14 RX ADMIN — IRON SUCROSE 200 MG: 20 INJECTION, SOLUTION INTRAVENOUS at 11:55

## 2019-04-14 RX ADMIN — DOCUSATE SODIUM 100 MG: 100 CAPSULE, LIQUID FILLED ORAL at 15:19

## 2019-04-14 RX ADMIN — MOMETASONE FUROATE AND FORMOTEROL FUMARATE DIHYDRATE 2 PUFF: 200; 5 AEROSOL RESPIRATORY (INHALATION) at 19:49

## 2019-04-14 RX ADMIN — MOMETASONE FUROATE AND FORMOTEROL FUMARATE DIHYDRATE 2 PUFF: 200; 5 AEROSOL RESPIRATORY (INHALATION) at 08:30

## 2019-04-14 RX ADMIN — MEGESTROL ACETATE 200 MG: 40 SUSPENSION ORAL at 09:35

## 2019-04-14 RX ADMIN — ENOXAPARIN SODIUM 40 MG: 40 INJECTION SUBCUTANEOUS at 09:36

## 2019-04-14 RX ADMIN — Medication 150 MG: at 20:16

## 2019-04-14 RX ADMIN — OXYCODONE HYDROCHLORIDE 10 MG: 10 TABLET, FILM COATED, EXTENDED RELEASE ORAL at 20:16

## 2019-04-14 RX ADMIN — OXYCODONE HYDROCHLORIDE 10 MG: 10 TABLET, FILM COATED, EXTENDED RELEASE ORAL at 09:37

## 2019-04-14 RX ADMIN — PANTOPRAZOLE SODIUM 40 MG: 40 TABLET, DELAYED RELEASE ORAL at 09:36

## 2019-04-14 RX ADMIN — Medication 150 MG: at 09:37

## 2019-04-14 RX ADMIN — AZTREONAM 1 G: 1 INJECTION, POWDER, LYOPHILIZED, FOR SOLUTION INTRAMUSCULAR; INTRAVENOUS at 02:06

## 2019-04-14 RX ADMIN — SODIUM CHLORIDE: 9 INJECTION, SOLUTION INTRAVENOUS at 09:35

## 2019-04-14 RX ADMIN — AZTREONAM 1 G: 1 INJECTION, POWDER, LYOPHILIZED, FOR SOLUTION INTRAMUSCULAR; INTRAVENOUS at 09:37

## 2019-04-14 RX ADMIN — SENNOSIDES 8.6 MG: 8.6 TABLET, FILM COATED ORAL at 20:16

## 2019-04-14 RX ADMIN — ATORVASTATIN CALCIUM 10 MG: 10 TABLET, FILM COATED ORAL at 09:36

## 2019-04-14 RX ADMIN — CETIRIZINE HYDROCHLORIDE 10 MG: 10 TABLET ORAL at 09:36

## 2019-04-14 ASSESSMENT — PAIN SCALES - GENERAL
PAINLEVEL_OUTOF10: 0
PAINLEVEL_OUTOF10: 4
PAINLEVEL_OUTOF10: 7
PAINLEVEL_OUTOF10: 5
PAINLEVEL_OUTOF10: 6
PAINLEVEL_OUTOF10: 7
PAINLEVEL_OUTOF10: 7

## 2019-04-14 ASSESSMENT — PAIN DESCRIPTION - ORIENTATION
ORIENTATION: RIGHT;MID
ORIENTATION: RIGHT;MID
ORIENTATION: RIGHT

## 2019-04-14 ASSESSMENT — ENCOUNTER SYMPTOMS
COUGH: 0
DIARRHEA: 0
WHEEZING: 0
SHORTNESS OF BREATH: 0
SINUS PRESSURE: 0
NAUSEA: 0
CONSTIPATION: 0
VOMITING: 0
VOICE CHANGE: 0
ABDOMINAL PAIN: 1
SORE THROAT: 1
BLOOD IN STOOL: 0

## 2019-04-14 ASSESSMENT — PAIN DESCRIPTION - FREQUENCY
FREQUENCY: INTERMITTENT
FREQUENCY: INTERMITTENT

## 2019-04-14 ASSESSMENT — PAIN DESCRIPTION - DESCRIPTORS
DESCRIPTORS: ACHING
DESCRIPTORS: SHARP

## 2019-04-14 ASSESSMENT — PAIN DESCRIPTION - PAIN TYPE: TYPE: ACUTE PAIN

## 2019-04-14 ASSESSMENT — PAIN DESCRIPTION - LOCATION
LOCATION: BACK
LOCATION: BACK
LOCATION: ABDOMEN

## 2019-04-14 ASSESSMENT — PAIN DESCRIPTION - ONSET
ONSET: PROGRESSIVE
ONSET: PROGRESSIVE

## 2019-04-14 NOTE — PLAN OF CARE
Problem: Discharge Planning:  Goal: Discharged to appropriate level of care  Description  Discharged to appropriate level of care  4/14/2019 1005 by Brittani Florentino  Outcome: Ongoing  4/14/2019 0356 by Brooklynn Hawk RN  Outcome: Ongoing     Problem: Gas Exchange - Impaired:  Goal: Levels of oxygenation will improve  Description  Levels of oxygenation will improve  4/14/2019 1005 by Brittani Florentino  Outcome: Ongoing  4/14/2019 0356 by Brooklynn Hawk RN  Outcome: Ongoing     Problem: Tissue Perfusion, Altered:  Goal: Circulatory function within specified parameters  Description  Circulatory function within specified parameters  4/14/2019 1005 by Brittani Florentino  Outcome: Ongoing  4/14/2019 0356 by Brooklynn Hawk RN  Outcome: Ongoing     Problem: Falls - Risk of:  Goal: Will remain free from falls  Description  Will remain free from falls  4/14/2019 1005 by Brittani Florentino  Outcome: Ongoing  4/14/2019 0356 by Brooklynn Hawk RN  Outcome: Ongoing  Goal: Absence of physical injury  Description  Absence of physical injury  4/14/2019 1005 by Brittani Florentino  Outcome: Ongoing  4/14/2019 0356 by Brooklynn Hawk RN  Outcome: Ongoing     Problem: Pain:  Goal: Pain level will decrease  Description  Pain level will decrease  4/14/2019 1005 by Brittani Florentino  Outcome: Ongoing  4/14/2019 0356 by Brooklynn Hawk RN  Outcome: Ongoing  Goal: Control of acute pain  Description  Control of acute pain  4/14/2019 1005 by Brittani Florentino  Outcome: Ongoing  4/14/2019 0356 by Brooklynn Hawk RN  Outcome: Ongoing  Goal: Control of chronic pain  Description  Control of chronic pain  4/14/2019 1005 by Brittani Florentino  Outcome: Ongoing  4/14/2019 0356 by Brooklynn Hawk RN  Outcome: Ongoing

## 2019-04-14 NOTE — PROGRESS NOTES
[facility]      Daily Progress Note     Admit Date: 4/13/2019  Bed/Room No.  8823/6082-66  Admitting Physician : Kendy Maria MD  Code Status :Full Code  Hospital Day:  LOS: 1 day   Chief Complaint:     Chief Complaint   Patient presents with    Fatigue    Emesis     Principal Problem:    SIRS (systemic inflammatory response syndrome) ([facility]  Active Problems:    Essential hypertension    Mitral valve prolapse    Stage 3 chronic kidney disease (HCC)    Chronic bronchitis (HCC)    Liver metastasis (HCC)    Duodenal adenocarcinoma (HCC)    Lactic acid acidosis    Dehydration    Moderate malnutrition (HCC)    Loss of appetite    Right upper quadrant pain  Resolved Problems:    * No resolved hospital problems. *    Subjective : Interval History/Significant events :  04/14/19    Patient reports improvement in pain control . Nausea is better , appetite is improving . Still rates pain as 8/10 in RUQ . Breathing is stable , no bowel movement. She is c/o sore throat. Patient denies any fevers, chills, sinus congestion, headaches, or wheezing, cough, sputum, abdominal pain, diarrhea. Vitals - Stable afebrile  Labs - decreasing creatinine, low albumin. White count still high . Bili normal .     Nursing notes , Consults notes reviewed. Overnight events and updates discussed with Nursing staff . Background History:         Kemi Donohue is 72 y.o. female  Who was admitted to the hospital on 4/13/2019 for treatment of SIRS (systemic inflammatory response syndrome) ([email or web address] Patient was brought to emergency room by friend for right upper quadrant pain, poor oral intake, loss of appetite, weight loss. 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 Patient had recent port placement and is waiting for chemotherapy. Abdominal pain was severe, right upper quadrant, nonradiating, no worsening or relieving factors.   She had poor appetite, easy satiety, nausea persistent along with nonbloody nonbilious emesis. Patient denies any fever, chills, difficulty breathing, wheezing, sputum production, dysuria, diarrhea. She had significant weight loss in last few months. Initial evaluation showed pulse 96, blood pressure 105/71, temperature 98.1. Lab work showed BUN 27, creatinine 1.2, lactic acid 3.3, albumin 2.8, bilirubin 0.9, , ALT 58, alk phos 668, WBC of 18.8, hemoglobin 8.8, platelet 728. Chest x-ray showed no acute cardiac pulmonary process. UA was negative for UTI . Patient was treated with empiric aztreonam and vanco for possible sepsis which was ruled out with negative blood cultures , CXR, UA and absence of symptoms for infection. Patient was treated with aggressive fluid resuscitation and lactic acidosis resolved            PMH:  Past Medical History:   Diagnosis Date    Alcohol abuse 7/20/2012    Du[facility] ([facility]     Hyperlipidemia 6/9/2015    Hypertension     Liver metastasis ([facility]     Liver metastasis ([facility]     Pancreatitis     Stone, kidney       Allergies: Allergies   Allergen Reactions    Aspirin Anaphylaxis    Penicillins     Iv Dye [Iodides] Hives      Medications :    mometasone-formoterol 2 puff Inhalation BID   fluticasone 1 spray Nasal Daily   cetirizine 10 mg Oral Daily   pantoprazole 40 mg Oral Daily   amLODIPine 5 mg Oral Nightly   atorvastatin 10 mg Oral Daily   sodium chloride flush 10 mL Intravenous 2 times per day   enoxaparin 40 mg Subcutaneous Daily   vancomycin (VANCOCIN) intermittent dosing (placeholder)  Other RX Placeholder   vancomycin 1,250 mg Intravenous Q24H   aztreonam 1 g Intravenous Q8H   megestrol 200 mg Oral Daily   oxyCODONE 10 mg Oral 2 times per day       Review of Systems   Review of Systems   Constitutional: Positive for appetite change and fatigue. Negative for activity change, chills, fever and unexpected weight change. HENT: Positive for sore throat.  Negative for congestion, mouth sores, postnasal drip, sinus pressure and voice change. Eyes: Negative for visual disturbance. Respiratory: Negative for cough, shortness of breath and wheezing. Cardiovascular: Negative for chest pain and palpitations. Gastrointestinal: Positive for abdominal pain (RUQ pain). Negative for blood in stool, constipation, diarrhea, nausea and vomiting. Endocrine: Negative for polyuria. Genitourinary: Negative for difficulty urinating, dysuria, frequency and urgency. Musculoskeletal: Negative for arthralgias, joint swelling and myalgias. Neurological: Negative for dizziness, tremors, speech difficulty, light-headedness and headaches. Objective :      Current Vitals : Temp: 98 °F (36.7 °C),  Pulse: 73, Resp: 18, BP: (!) 99/58, SpO2: 97 %  Last 24 Hrs Vitals   Patient Vitals for the past 24 hrs:   BP Temp Temp src Pulse Resp SpO2 Height Weight   04/14/19 0700 (!) 99/58 98 °F (36.7 °C) Oral 73 18 97 % -- --   04/13/19 2008 -- -- -- -- -- 96 % -- --   04/13/19 1954 114/60 98.3 °F (36.8 °C) Oral 85 22 95 % -- --   04/13/19 1730 130/68 97.6 °F (36.4 °C) Oral 92 26 99 % 5' 3\" (1.6 m) 154 lb 1.6 oz (69.9 kg)   04/13/19 1632 -- -- -- -- -- 99 % -- --   04/13/19 1631 94/62 -- -- 85 22 -- -- --   04/13/19 1250 -- 98.1 °F (36.7 °C) Oral -- -- -- -- --   04/13/19 1246 105/71 -- -- 93 26 96 % -- --   04/13/19 1233 114/71 -- -- 96 22 97 % -- --     Intake / output   04/13 0701 - 04/14 0700  In: 8608 [P.O.:240; I.V.:3925]  Out: 200 [Urine:200]  Physical Exam:  Physical Exam   Constitutional: She is oriented to person, place, and time. She appears well-developed and well-nourished. No distress. HENT:   Mouth/Throat: No oropharyngeal exudate. Eyes: Pupils are equal, round, and reactive to light. Conjunctivae are normal. No scleral icterus. Neck: No JVD present. No thyromegaly present. Cardiovascular: Normal rate, regular rhythm and normal heart sounds. Exam reveals no gallop and no friction rub.    No murmur heard. Pulmonary/Chest: Effort normal. No respiratory distress. She has no wheezes. She has no rales. PORT in place    Abdominal: Soft. Bowel sounds are normal. She exhibits no distension and no mass. There is no tenderness. There is no rebound and no guarding. Lymphadenopathy:     She has no cervical adenopathy. Neurological: She is alert and oriented to person, place, and time. No cranial nerve deficit. She exhibits normal muscle tone. Skin: She is not diaphoretic. Nursing note and vitals reviewed. Lower Extremities : No ankle Edema , No calf Tenderness     Laboratory findings:    Recent Labs     04/13/19  1303 04/14/19  0517   WBC 18.8* 16.9*   HGB 8.8* 7.1*   HCT 29.9* 24.5*   * 442   INR 1.2 1.4     Recent Labs     04/13/19  1241 04/13/19  1303 04/14/19  0517   NA  --  142 141   K  --  4.0 3.9   CL  --  99 107   CO2  --  23 22   GLUCOSE  --  106* 107*   BUN  --  27* 19   CREATININE 1.23* 1.20* 0.90   MG  --   --  2.2   CALCIUM  --  9.6 8.1*   CAION  --   --  1.11*   PHOS  --   --  3.1     Recent Labs     04/13/19  1303 04/14/19  0517   PROT 7.4 5.9*   LABALBU 2.8* 2.2*   * 154*   ALT 58* 45*   ALKPHOS 668* 549*   BILITOT 0.90 0.79   LIPASE 22  --           Specific Gravity, UA   Date Value Ref Range Status   04/13/2019 1.015 1.005 - 1.030 Final     Protein, UA   Date Value Ref Range Status   04/13/2019 TRACE (A) NEGATIVE Final     RBC, UA   Date Value Ref Range Status   04/13/2019 2 TO 5 0 - 4 /HPF Final     Comment:     Reference range defined for non-centrifuged specimen.      Bacteria, UA   Date Value Ref Range Status   04/13/2019 FEW (A) None Final     Nitrite, Urine   Date Value Ref Range Status   04/13/2019 NEGATIVE NEGATIVE Final     WBC, UA   Date Value Ref Range Status   04/13/2019 5 TO 10 0 - 5 /HPF Final     Leukocyte Esterase, Urine   Date Value Ref Range Status   04/13/2019 NEGATIVE NEGATIVE Final     Imaging/Diagonstics:     CXR 4/13/19 - no acute process      CT abdomen pelvis with contrast on 2/19/19  Irregular masslike wall thickening of the duodenum concerning for malignancy. Numerous hypodense mass lesions within the liver, concerning for metastatic   disease.       Hyperdense, presumed necrotic lymph node adjacent to the aorta measuring 3.2   x 1.7 cm.       No bowel obstruction or inflammation.  No free intraperitoneal air or fluid. No results found for: IRON, TIBC, FERRITIN      Clinical Course : gradually improving  Assessment and Plan  :        1. SIRS - unlikely sepsis as no focus of action present. urinalysis, urine culture sensitivity, blood culture is negative . discontinue  Vanco, Aztreonam. Reactive leucocytosis  Symptoms likely secondary to dehydration from poor oral intake and nausea and vomiting. Continue fluid resuscitation. s/p cholecystectomy. 2. Lactic acidosis- improved with IV hydration. 3. MARIA VICTORIA - secondary to dehydration - resolved   4. 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 Start OxyContin, percocet as needed . Bowel regimen senna and Colace. Has appointnet for outpatient chemo on 4/17/19 .   5. Essential hypertension-hold antihypertensives for now due to hypotension. 6. Chronic bronchitis-stable. Albuterol as needed   7. Loss of appetite-started Megace  8. Moderate protein calorie malnutrition- supplements. 9. Anemia from chronic blood loss -      Monitor off antibiotics for tonight and likely discharge in 24 hours if stable. Continue to monitor vitals , Intake / output ,  Cell count , HGB , Kidney function, oxygenation  as indicated . Plan and updates discussed with patient ,  answers  explained to satisfaction.    Plan discussed with Staff Josseline Wright RN     (Please note that portions of this note were completed with a voice recognition program. Efforts were made to edit the dictations but occasionally words are mis-transcribed.)      Brennan Joe MD  4/14/2019

## 2019-04-14 NOTE — PROGRESS NOTES
Smoking Cessation - topics covered   []  Health Risks  []  Benefits of Quitting   []  Smoking Cessation  []  Patient has no history of tobacco use  [x]  Patient is former smoker. [x]  No need for tobacco cessation education. []  Booklet given  []  Patient verbalizes understanding. []  Patient denies need for tobacco cessation education. []  Unable to meet with patient today. Will follow up as able.   Micah Alatorre  10:21 AM

## 2019-04-14 NOTE — PROGRESS NOTES
Physical Therapy    Facility/Department: [address] ORTHO/MED SURG  Initial Assessment    NAME: David Le  : 1945  MRN: [id number]    Date of Service: 2019    Discharge Recommendations:    Further therapy recommended at discharge. Assessment   Body structures, Functions, Activity limitations: Decreased functional mobility ; Decreased endurance;Decreased strength  Assessment: The pt demonstrated a significant amount of fatigue with only a minimal increase in activity. Prognosis: Good  Decision Making: Medium Complexity  Patient Education: PT  POC, Goals  REQUIRES PT FOLLOW UP: Yes  Activity Tolerance  Activity Tolerance: Patient limited by fatigue       Patient Diagnosis(es): The primary encounter diagnosis was Septicemia ([address] Diagnoses of Nausea and vomiting, intractability of vomiting not specified, unspecified vomiting type, Dehydration, Right upper quadrant abdominal pain, Liver metastasis ([address] and Duodenal adenocarcinoma ([address] were also pertinent to this visit. 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 has a past surgical history that includes Hysterectomy; syl and bso (cervix removed); Pancreas surgery; Upper gastrointestinal endoscopy (N/A, 2019); Upper gastrointestinal endoscopy (2019); and Upper gastrointestinal endoscopy (N/A, 2019).     Restrictions  Restrictions/Precautions  Restrictions/Precautions: Up as Tolerated  Vision/Hearing  Vision: Impaired  Vision Exceptions: Wears glasses at all times  Hearing: Within functional limits     Subjective  General  Patient assessed for rehabilitation services?: Yes  Response To Previous Treatment: Not applicable  Family / Caregiver Present: No  Follows Commands: Within Functional Limits  Pain Screening  Patient Currently in Pain: Yes  Pain Assessment  Pain Assessment: 0-10  Pain Level: reach, Patient at risk for falls, Left in bed, Bed alarm in place, Nurse notified    G-Code    OutComes Score     AM-PAC Score  AM-PAC Inpatient Mobility Raw Score : 18  AM-PAC Inpatient T-Scale Score : 43.63  Mobility Inpatient CMS 0-100% Score: 46.58  Mobility Inpatient CMS G-Code Modifier : CK     Goals  Short term goals  Time Frame for Short term goals: 10 visits  Short term goal 1: Transfers with SBA  Short term goal 2: amb 125 ft with a RW x SBA  Short term goal 3: ascend/descend 4 steps with SBA  Short term goal 4: 20-30 min exercise programx SBA  Patient Goals   Patient goals : Return home       Therapy Time   Individual Concurrent Group Co-treatment   Time In 5146         Time Out 0800         Minutes 25              1 of [address], PT

## 2019-04-14 NOTE — PLAN OF CARE
Problem: Discharge Planning:  Goal: Discharged to appropriate level of care  Description  Discharged to appropriate level of care  4/14/2019 0356 by Tan Contreras RN  Outcome: Ongoing     Problem: Gas Exchange - Impaired:  Goal: Levels of oxygenation will improve  Description  Levels of oxygenation will improve  4/14/2019 0356 by Tan Contreras RN  Outcome: Ongoing     Problem: Tissue Perfusion, Altered:  Goal: Circulatory function within specified parameters  Description  Circulatory function within specified parameters  4/14/2019 0356 by Tan Contreras RN  Outcome: Ongoing     Problem: Falls - Risk of:  Goal: Will remain free from falls  Description  Will remain free from falls  4/14/2019 0356 by Tan Contreras RN  Outcome: Ongoing     Problem: Falls - Risk of:  Goal: Absence of physical injury  Description  Absence of physical injury  4/14/2019 0356 by Tan Contreras RN  Outcome: Ongoing     Problem: Pain:  Goal: Pain level will decrease  Description  Pain level will decrease  4/14/2019 0356 by Tan Contreras RN  Outcome: Ongoing     Problem: Pain:  Goal: Control of acute pain  Description  Control of acute pain  4/14/2019 0356 by Tan Contreras RN  Outcome: Ongoing     Problem: Pain:  Goal: Control of chronic pain  Description  Control of chronic pain  4/14/2019 0356 by Tan Contreras RN  Outcome: Ongoing

## 2019-04-14 NOTE — PROGRESS NOTES
Informed Dr. Lyndsey Samson of patients heart rate of 167, patient asymptomatic, blood pressure at this time 94/62, patient converted back to normal sinus rhythm

## 2019-04-15 LAB
ABSOLUTE EOS #: 0.3 K/UL (ref 0–0.44)
ABSOLUTE IMMATURE GRANULOCYTE: 0.3 K/UL (ref 0–0.3)
ABSOLUTE LYMPH #: 2.37 K/UL (ref 1.1–3.7)
ABSOLUTE MONO #: 0.89 K/UL (ref 0.1–1.2)
ALBUMIN SERPL-MCNC: 1.9 G/DL (ref 3.5–5.2)
ALBUMIN/GLOBULIN RATIO: 0.5 (ref 1–2.5)
ALP BLD-CCNC: 501 U/L (ref 35–104)
ALT SERPL-CCNC: 46 U/L (ref 5–33)
ANION GAP SERPL CALCULATED.3IONS-SCNC: 13 MMOL/L (ref 9–17)
AST SERPL-CCNC: 140 U/L
BASOPHILS # BLD: 0 % (ref 0–2)
BASOPHILS ABSOLUTE: 0 K/UL (ref 0–0.2)
BILIRUB SERPL-MCNC: 0.69 MG/DL (ref 0.3–1.2)
BUN BLDV-MCNC: 9 MG/DL (ref 8–23)
BUN/CREAT BLD: ABNORMAL (ref 9–20)
CALCIUM SERPL-MCNC: 7.9 MG/DL (ref 8.6–10.4)
CHLORIDE BLD-SCNC: 111 MMOL/L (ref 98–107)
CO2: 17 MMOL/L (ref 20–31)
CREAT SERPL-MCNC: 0.71 MG/DL (ref 0.5–0.9)
DIFFERENTIAL TYPE: ABNORMAL
EKG ATRIAL RATE: 94 BPM
EKG P AXIS: 61 DEGREES
EKG P-R INTERVAL: 134 MS
EKG Q-T INTERVAL: 358 MS
EKG QRS DURATION: 86 MS
EKG QTC CALCULATION (BAZETT): 457 MS
EKG R AXIS: 37 DEGREES
EKG T AXIS: 93 DEGREES
EKG VENTRICULAR RATE: 98 BPM
EOSINOPHILS RELATIVE PERCENT: 2 % (ref 1–4)
GFR AFRICAN AMERICAN: >60 ML/MIN
GFR NON-AFRICAN AMERICAN: >60 ML/MIN
GFR SERPL CREATININE-BSD FRML MDRD: ABNORMAL ML/MIN/{1.73_M2}
GFR SERPL CREATININE-BSD FRML MDRD: ABNORMAL ML/MIN/{1.73_M2}
GLUCOSE BLD-MCNC: 88 MG/DL (ref 70–99)
HCT VFR BLD CALC: 26.4 % (ref 36.3–47.1)
HEMOGLOBIN: 7.2 G/DL (ref 11.9–15.1)
IMMATURE GRANULOCYTES: 2 %
LYMPHOCYTES # BLD: 16 % (ref 24–43)
MCH RBC QN AUTO: 24.1 PG (ref 25.2–33.5)
MCHC RBC AUTO-ENTMCNC: 27.3 G/DL (ref 28.4–34.8)
MCV RBC AUTO: 88.3 FL (ref 82.6–102.9)
MONOCYTES # BLD: 6 % (ref 3–12)
MORPHOLOGY: ABNORMAL
NRBC AUTOMATED: 0.6 PER 100 WBC
PDW BLD-RTO: 18.5 % (ref 11.8–14.4)
PLATELET # BLD: 442 K/UL (ref 138–453)
PLATELET ESTIMATE: ABNORMAL
PMV BLD AUTO: 9.5 FL (ref 8.1–13.5)
POTASSIUM SERPL-SCNC: 3.5 MMOL/L (ref 3.7–5.3)
RBC # BLD: 2.99 M/UL (ref 3.95–5.11)
RBC # BLD: ABNORMAL 10*6/UL
SEG NEUTROPHILS: 74 % (ref 36–65)
SEGMENTED NEUTROPHILS ABSOLUTE COUNT: 10.94 K/UL (ref 1.5–8.1)
SODIUM BLD-SCNC: 141 MMOL/L (ref 135–144)
TOTAL PROTEIN: 5.7 G/DL (ref 6.4–8.3)
WBC # BLD: 14.8 K/UL (ref 3.5–11.3)
WBC # BLD: ABNORMAL 10*3/UL

## 2019-04-15 PROCEDURE — 85025 COMPLETE CBC W/AUTO DIFF WBC: CPT

## 2019-04-15 PROCEDURE — 80053 COMPREHEN METABOLIC PANEL: CPT

## 2019-04-15 PROCEDURE — 97535 SELF CARE MNGMENT TRAINING: CPT

## 2019-04-15 PROCEDURE — 6370000000 HC RX 637 (ALT 250 FOR IP): Performed by: FAMILY MEDICINE

## 2019-04-15 PROCEDURE — 94640 AIRWAY INHALATION TREATMENT: CPT

## 2019-04-15 PROCEDURE — 76937 US GUIDE VASCULAR ACCESS: CPT

## 2019-04-15 PROCEDURE — 6360000002 HC RX W HCPCS: Performed by: FAMILY MEDICINE

## 2019-04-15 PROCEDURE — 99232 SBSQ HOSP IP/OBS MODERATE 35: CPT | Performed by: FAMILY MEDICINE

## 2019-04-15 PROCEDURE — 36415 COLL VENOUS BLD VENIPUNCTURE: CPT

## 2019-04-15 PROCEDURE — 97166 OT EVAL MOD COMPLEX 45 MIN: CPT

## 2019-04-15 PROCEDURE — 1200000000 HC SEMI PRIVATE

## 2019-04-15 PROCEDURE — 97530 THERAPEUTIC ACTIVITIES: CPT

## 2019-04-15 PROCEDURE — 2580000003 HC RX 258: Performed by: FAMILY MEDICINE

## 2019-04-15 RX ORDER — POTASSIUM CHLORIDE 20 MEQ/1
40 TABLET, EXTENDED RELEASE ORAL ONCE
Status: COMPLETED | OUTPATIENT
Start: 2019-04-15 | End: 2019-04-15

## 2019-04-15 RX ADMIN — Medication 150 MG: at 08:26

## 2019-04-15 RX ADMIN — ENOXAPARIN SODIUM 40 MG: 40 INJECTION SUBCUTANEOUS at 08:26

## 2019-04-15 RX ADMIN — ATORVASTATIN CALCIUM 10 MG: 10 TABLET, FILM COATED ORAL at 08:26

## 2019-04-15 RX ADMIN — MOMETASONE FUROATE AND FORMOTEROL FUMARATE DIHYDRATE 2 PUFF: 200; 5 AEROSOL RESPIRATORY (INHALATION) at 07:51

## 2019-04-15 RX ADMIN — IRON SUCROSE 200 MG: 20 INJECTION, SOLUTION INTRAVENOUS at 11:11

## 2019-04-15 RX ADMIN — SENNOSIDES 8.6 MG: 8.6 TABLET, FILM COATED ORAL at 20:30

## 2019-04-15 RX ADMIN — OXYCODONE HYDROCHLORIDE 10 MG: 10 TABLET, FILM COATED, EXTENDED RELEASE ORAL at 08:26

## 2019-04-15 RX ADMIN — PANTOPRAZOLE SODIUM 40 MG: 40 TABLET, DELAYED RELEASE ORAL at 08:26

## 2019-04-15 RX ADMIN — DOCUSATE SODIUM 100 MG: 100 CAPSULE, LIQUID FILLED ORAL at 08:26

## 2019-04-15 RX ADMIN — Medication 150 MG: at 20:30

## 2019-04-15 RX ADMIN — POTASSIUM CHLORIDE 40 MEQ: 20 TABLET, EXTENDED RELEASE ORAL at 14:09

## 2019-04-15 RX ADMIN — MOMETASONE FUROATE AND FORMOTEROL FUMARATE DIHYDRATE 2 PUFF: 200; 5 AEROSOL RESPIRATORY (INHALATION) at 19:45

## 2019-04-15 RX ADMIN — Medication 10 ML: at 20:31

## 2019-04-15 RX ADMIN — OXYCODONE HYDROCHLORIDE 10 MG: 10 TABLET, FILM COATED, EXTENDED RELEASE ORAL at 20:31

## 2019-04-15 RX ADMIN — MEGESTROL ACETATE 200 MG: 40 SUSPENSION ORAL at 08:26

## 2019-04-15 ASSESSMENT — PAIN DESCRIPTION - ONSET
ONSET: ON-GOING
ONSET: ON-GOING

## 2019-04-15 ASSESSMENT — PAIN DESCRIPTION - FREQUENCY
FREQUENCY: CONTINUOUS
FREQUENCY: INTERMITTENT

## 2019-04-15 ASSESSMENT — PAIN DESCRIPTION - PROGRESSION
CLINICAL_PROGRESSION: GRADUALLY IMPROVING
CLINICAL_PROGRESSION: NOT CHANGED
CLINICAL_PROGRESSION: GRADUALLY IMPROVING

## 2019-04-15 ASSESSMENT — PAIN DESCRIPTION - DESCRIPTORS
DESCRIPTORS: ACHING;CONSTANT
DESCRIPTORS: ACHING;DISCOMFORT

## 2019-04-15 ASSESSMENT — PAIN DESCRIPTION - LOCATION
LOCATION: ABDOMEN
LOCATION: ABDOMEN
LOCATION: ABDOMEN;BACK
LOCATION: ABDOMEN

## 2019-04-15 ASSESSMENT — PAIN SCALES - GENERAL
PAINLEVEL_OUTOF10: 4
PAINLEVEL_OUTOF10: 2
PAINLEVEL_OUTOF10: 4
PAINLEVEL_OUTOF10: 3
PAINLEVEL_OUTOF10: 4

## 2019-04-15 ASSESSMENT — PAIN DESCRIPTION - ORIENTATION
ORIENTATION: RIGHT
ORIENTATION: RIGHT
ORIENTATION: RIGHT;OUTER;UPPER

## 2019-04-15 ASSESSMENT — PAIN DESCRIPTION - PAIN TYPE
TYPE: ACUTE PAIN

## 2019-04-15 NOTE — CARE COORDINATION
hygiene needs (bathing/dressing/grooming): Independent  Ability to manage medications: Independent  Ability to prepare food:  Independent  Ability to maintain home (clean home, laundry):  Needs Assistance  Ability to drive and/or has transportation:  Needs Assistance  Ability to do shopping:  Needs Assistance  Ability to manage finances: Independent  Is patient able to live independently?:  Yes  Hearing and Vision  Visual Impairment:  None  Hearing Impairment:  None  Care Transitions Interventions         Follow Up  Future Appointments   Date Time Provider Suresh Powell   6/28/2019 10:35 AM Negra Holland MD [address] [place] [address]  There are no preventive care reminders to display for this patient.     Lizabeth Vera RN

## 2019-04-15 NOTE — PROGRESS NOTES
[facility]    Progress Note    4/15/2019    8:06 AM    Name:   Isidro Mortensen  MRN:     [id number]     [place]:      [de-identified]   Room:   [address] [place] [address] Day:  2  Admit Date:  4/13/2019 12:21 PM    PCP:   Zane Chirinos MD  Code Status:  Full Code    Subjective:     C/C:   Chief Complaint   Patient presents with    Fatigue    Emesis     Interval History Status: improved. Patient seen and examined at bedside, no acute events overnight. Continue to improve overall , wants to eat  Patient denies any chest pain, shortness of breath, chills, fevers, nausea or vomiting. Patient vitals, labs  were reviewed,from overnight shift and morning updates were noted and discussed with the nurse  Brief History:     Isidro Mortensen is 72 y.o. female  Who was admitted to the hospital on 4/13/2019 for treatment of SIRS (systemic inflammatory response syndrome) ([facility] Patient was brought to emergency room by friend for right upper quadrant pain, poor oral intake, loss of appetite, weight loss. 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  Patient had recent port placement and is waiting for chemotherapy.  Abdominal pain was severe, right upper quadrant, nonradiating, no worsening or relieving factors.  She had poor appetite, easy satiety, nausea persistent along with nonbloody nonbilious emesis.  Patient denies any fever, chills, difficulty breathing, wheezing, sputum production, dysuria, diarrhea.  She had significant weight loss in last few months.  Initial evaluation showed pulse 96, blood pressure 105/71, temperature 98. 1.  Lab work showed BUN 27, creatinine 1.2, lactic acid 3.3, albumin 2.8, bilirubin 0.9, , ALT 58, alk phos 668, WBC of 18.8, hemoglobin 8.8, platelet 040.  Chest x-ray showed no acute cardiac pulmonary process.  UA was negative for UTI .    Patient was treated with empiric aztreonam and vanco for possible sepsis which was ruled out with negative blood cultures , CXR, UA and absence of symptoms for infection. Patient was treated with aggressive fluid resuscitation and lactic acidosis resolved     Review of Systems:     Constitutional:  negative for chills, fevers, sweats. Chronic fatigue and decreased appetite  Respiratory:  negative for cough, dyspnea on exertion, hemoptysis, shortness of breath, wheezing  Cardiovascular:  negative for chest pain, chest pressure/discomfort, lower extremity edema, palpitations  Gastrointestinal:  negative for abdominal pain, constipation, diarrhea, nausea, vomiting  Neurological:  negative for dizziness, headache    Medications: Allergies:     Allergies   Allergen Reactions    Aspirin Anaphylaxis    Penicillins     Iv Dye [Iodides] Hives       Current Meds:   Scheduled Meds:    iron polysaccharides  150 mg Oral BID    iron sucrose  200 mg Intravenous Q24H    senna  1 tablet Oral Nightly    docusate sodium  100 mg Oral Daily    mometasone-formoterol  2 puff Inhalation BID    fluticasone  1 spray Nasal Daily    cetirizine  10 mg Oral Daily    pantoprazole  40 mg Oral Daily    amLODIPine  5 mg Oral Nightly    atorvastatin  10 mg Oral Daily    sodium chloride flush  10 mL Intravenous 2 times per day    enoxaparin  40 mg Subcutaneous Daily    vancomycin (VANCOCIN) intermittent dosing (placeholder)   Other RX Placeholder    megestrol  200 mg Oral Daily    oxyCODONE  10 mg Oral 2 times per day     Continuous Infusions:    sodium chloride 150 mL/hr at 04/14/19 0935     PRN Meds: albuterol sulfate HFA, cromolyn, sodium chloride, nicotine polacrilex, sodium chloride flush, acetaminophen, oxyCODONE-acetaminophen, ondansetron    Data:     Past Medical History:   has a past medical history of Alcohol abuse, Duodenal adenocarcinoma ([facility] Hyperlipidemia, Hypertension, Liver metastasis ([facility] Liver metastasis ([facility] Pancreatitis, and Stone, kidney. Social History:   reports that she quit smoking about 4 years ago. Her smoking use included cigarettes. She has a 9.00 pack-year smoking history. She has never used smokeless tobacco. She reports that she drinks about 0.6 oz of alcohol per week. She reports that she does not use drugs. Family History: History reviewed. No pertinent family history. Vitals:  BP (!) 103/56   Pulse 85   Temp 97.5 °F (36.4 °C) (Oral)   Resp 16   Ht 5' 3\" (1.6 m)   Wt 154 lb 1.6 oz (69.9 kg)   SpO2 97%   BMI 27.30 kg/m²   Temp (24hrs), Av.7 °F (36.5 °C), Min:97.5 °F (36.4 °C), Max:97.9 °F (36.6 °C)    Recent Labs     19  1241   POCGLU 100       I/O (24Hr):     Intake/Output Summary (Last 24 hours) at 4/15/2019 0806  Last data filed at 4/15/2019 0600  Gross per 24 hour   Intake 3520 ml   Output --   Net 3520 ml       Labs:    Hematology:  Recent Labs     19  1303 19  0517   WBC 18.8* 16.9*   RBC 3.60* 2.90*   HGB 8.8* 7.1*   HCT 29.9* 24.5*   MCV 83.1 84.5   MCH 24.4* 24.5*   MCHC 29.4 29.0   RDW 18.1* 18.0*   * 442   MPV 9.4 9.2   INR 1.2 1.4     Chemistry:  Recent Labs     19  1241 19  1303 19  1450 19  1734 19  0028 19  0517   NA  --  142  --   --   --  141   K  --  4.0  --   --   --  3.9   CL  --  99  --   --   --  107   CO2  --  23  --   --   --  22   GLUCOSE  --  106*  --   --   --  107*   BUN  --  27*  --   --   --  19   CREATININE 1.23* 1.20*  --   --   --  0.90   MG  --   --   --   --   --  2.2   ANIONGAP  --  20*  --   --   --  12   LABGLOM 43* 44*  --   --   --  >60   GFRAA  --  54*  --   --   --  >60   CALCIUM  --  9.6  --   --   --  8.1*   CAION  --   --   --   --   --  1.11*   PHOS  --   --   --   --   --  3.1   TROPHS  --  16* 12  --   --   --    LACTACIDWB  --   --   --  2.3* 1.4  --      Recent Labs     19  1241 19  1303 19  0517   PROT  --  7.4 5.9*   LABALBU  --  2.8* 2.2*   AST  --  223* 154*   ALT  --  58* 45* ALKPHOS  --  668* 549*   BILITOT  --  0.90 0.79   LIPASE  --  22  --    POCGLU 100  --   --          Lab Results   Component Value Date/Time    SPECIAL NOT REPORTED 04/13/2019 07:16 PM     Lab Results   Component Value Date/Time    CULTURE NO SIGNIFICANT GROWTH 04/13/2019 07:16 PM       Lab Results   Component Value Date    FIO2 NOT REPORTED 04/13/2019       Radiology:        Physical Examination:        General appearance:  alert, cooperative and no distress  Mental Status:  oriented to person, place and time and normal affect  Lungs:  clear to auscultation bilaterally, normal effort, port noted  Heart:  regular rate and rhythm, no murmur  Abdomen:  soft, nontender, nondistended, normal bowel sounds, no masses, hepatomegaly, splenomegaly  Extremities:  no edema, redness, tenderness in the calves  Skin:  no gross lesions, rashes, induration    Assessment:        Primary Problem  SIRS (systemic inflammatory response syndrome) ([facility]    Active Hospital Problems    Diagnosis Date Noted    Lactic acid acidosis [E87.2] 04/13/2019    SIRS (systemic inflammatory response syndrome) (HCC) [R65.10] 04/13/2019    Dehydration [E86.0] 04/13/2019    Moderate malnutrition (HCC) [E44.0] 04/13/2019    Loss of appetite [R63.0] 04/13/2019    Right upper quadrant pain [R10.11] 04/13/2019    Liver metastasis (HCC) [C78.7]     Duodenal adenocarcinoma (HCC) [C17.0]     Chronic [facility] ([facility] [J42] 05/25/2017    Stage 3 chronic kidney disease ([facility] [N18.3] 06/09/2015    Essential hypertension [I10] 01/27/2012    Mitral valve prolapse [I34.1] 01/27/2012       Plan:        SIRS: roled out    Lactic acidosis : resolved with hydration, DC fluids     Dehydration: resolved, advance diet    MARIA VICTORIA: resolved    Hypokalemia: replace    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

## 2019-04-15 NOTE — PROGRESS NOTES
Walker  Assistance: Contact guard assistance  Distance: amb 35 ft with a RW x CGA   AROM x 4 ext in all planes x 10  Balance  Posture: Good  Sitting - Static: Good  Sitting - Dynamic: Fair  Standing - Static: Fair  Standing - Dynamic: Fair     Assessment   Body structures, Functions, Activity limitations: Decreased functional mobility ; Decreased endurance;Decreased strength  Assessment: The pt again demonstrated a significant amount of fatigue with only a minimal increase in activity.   Prognosis: Good  Decision Making: Medium Complexity  Patient Education: PT  POC, Goals  REQUIRES PT FOLLOW UP: Yes  Activity Tolerance  Activity Tolerance: Patient limited by fatigue     G-Code     OutComes Score  AM-PAC Score  AM-PAC Inpatient Mobility Raw Score : 18  AM-PAC Inpatient T-Scale Score : 43.63  Mobility Inpatient CMS 0-100% Score: 46.58  Mobility Inpatient CMS G-Code Modifier : CK     Goals  Short term goals  Time Frame for Short term goals: 10 visits  Short term goal 1: Transfers with SBA  Short term goal 2: amb 125 ft with a RW x SBA  Short term goal 3: ascend/descend 4 steps with SBA  Short term goal 4: 20-30 min exercise programx SBA  Patient Goals   Patient goals : Return home    Plan    Plan  Times per week: 5-6x wk  Current Treatment Recommendations: Strengthening, Transfer Training, Endurance Training, Gait Training, Functional Mobility Training, Stair training, Safety Education & Training  Safety Devices  Type of devices: Call light within reach, Patient at risk for falls, Left in bed, Bed alarm in place, Nurse notified     Therapy Time   Individual Concurrent Group Co-treatment   Time In 0825         Time Out 0848         Minutes 795 Kim Joseph, PT

## 2019-04-15 NOTE — FLOWSHEET NOTE
 Spiritual Assessment:   Patient is a 72year old female who is in the hospital because of shortness of breath and emesis. Patient was sitting in the recliner next to her bed and she was breathing pretty hard. She was not feeling well enough to have a conversation and admitted to feeling badly today. She was receptive to spiritual care and accepted prayer.  Intervention:   I spoke to the patient but was sensitive to her struggles to breathe and did not push her to talk. I spoke words of encouragement to her and assured her of the love and joel of God to sustain her during these difficult days. I expressed my confidence that her family and friends are praying for her. I asked if she has a Rastafarian affiliation and she nodded her head. I told her that I believed that her  is praying for her and she nodded her head. I asked if a prayer would be helpful for her and she nodded her head. I prayed and wished her well. Outcome:   Patient did not say for very much but I told her that she can call for a  any time of day or night for a prayer or if she wanted to talk. She nodded her head. 04/15/19 0904   Encounter Summary   Services provided to: Patient   Place [facility] [address] No   Continue Visiting   (4/15/19)   Complexity of Encounter Low   Length of Encounter 15 minutes   Spiritual Assessment Completed Yes   Routine   Type Initial   Assessment Approachable;Passive;Coping   Intervention Active listening;Nurtured hope;Prayer;Sustaining presence/ Ministry of presence; Discussed belief system/[facility] practices/justin   Outcome Acceptance;Comfort;Coping;Receptive

## 2019-04-15 NOTE — CARE COORDINATION
Transitional Planning  Spoke with pt about possibly going to [facility] short term to get stronger for home. She states she is not going to a nursing home. Explained the reasons I feel she would benefit from short term rehab. She continues to refused any SNF. She does agree to [facility] and have home PT OT.   Her plan continues to be to go to her friend [facility] [id number] # [phone]

## 2019-04-15 NOTE — PROGRESS NOTES
Occupational Therapy   Occupational Therapy Initial Assessment  Date: 4/15/2019   Patient Name: Eva Graff  MRN: [id number]     : 1945    Date of Service: 4/15/2019    Discharge Recommendations:    Further therapy recommended at discharge. Assessment   Performance deficits / Impairments: Decreased functional mobility ; Decreased strength;Decreased endurance;Decreased ADL status; Decreased high-level IADLs  Treatment Diagnosis: septicemia   Prognosis: Good  Decision Making: Medium Complexity  Patient Education: pt ed on POc, purpose of eval, importance of  continued movement, benefits of therapy and safety during functional mobility. good return   REQUIRES OT FOLLOW UP: Yes  Activity Tolerance  Activity Tolerance: Patient Tolerated treatment well;Patient limited by fatigue  Safety Devices  Safety Devices in place: Yes  Type of devices: Call light within reach; Left in chair;Chair alarm in place; Patient at risk for falls  Restraints  Initially in place: No        Patient Diagnosis(es): The primary encounter diagnosis was Septicemia ([address] Diagnoses of Nausea and vomiting, intractability of vomiting not specified, unspecified vomiting type, Dehydration, Right upper quadrant abdominal pain, Liver metastasis ([address] and Duodenal adenocarcinoma ([address] were also pertinent to this visit. 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 has a past surgical history that includes Hysterectomy; syl and bso (cervix removed); Pancreas surgery; Upper gastrointestinal endoscopy (N/A, 2019); Upper gastrointestinal endoscopy (2019); and Upper gastrointestinal endoscopy (N/A, 2019).     Treatment Diagnosis: septicemia       Restrictions  Restrictions/Precautions  Restrictions/Precautions: Fall Risk  Required Braces or Orthoses?: No  Position Activity Restriction  Other position/activity restrictions: up with assist, up as tolerated     Subjective   General  Chart Reviewed: No  Patient assessed for rehabilitation services?: Yes  Family / Caregiver Present: No  Diagnosis: septicemia   General Comment  Comments: Rn ok'd for therapy this morning. Pt agreeable to participate in session and cooperative throughout   Pain Assessment  Pain Assessment: 0-10  Pain Level: 4  Pain Type: Acute pain  Pain Location: Abdomen  Pain Orientation: Right  Non-Pharmaceutical Pain Intervention(s): Distraction; Therapeutic presence; Ambulation/Increased Activity  Response to Pain Intervention: Patient Satisfied    Oxygen Therapy  O2 Device: None (Room air)  Social/Functional History  Social/Functional History  Lives With: Friend(s)  Type of Home: House  Home Layout: One level  Home Access: Stairs to enter with rails  Entrance Stairs - Number of Steps: 3  Bathroom Shower/Tub: Tub/Shower unit  Bathroom Toilet: Standard  Home Equipment: (pt reported no use of DME at baseline )  Receives Help From: Friend(s)  ADL Assistance: [address] [place] [address] Avenue: Needs assistance  Homemaking Responsibilities: No(pt reported friend completes majority of IADLs )  Ambulation Assistance: Independent  Transfer Assistance: Independent  Active : No  Mode of Transportation: Friends  Occupation: Retired  Leisure & Hobbies: listen to music, read     Objective   Vision: Impaired  Vision Exceptions: Wears glasses at all times  Hearing: Within functional limits    Orientation  Overall Orientation Status: Within Functional Limits     Balance  Sitting Balance: Supervision(~20 minutes in chair )  Standing Balance: Stand by assistance(with RW )  Standing Balance  Time: ~3 minutes  Activity: pt completed functional mobility within hospital room in order to transfer to chair  Sit to stand: Contact guard assistance  Stand to sit: Contact guard assistance  Comment: pt slow to complete and fatigued quickly during functional mobility      ADL  Feeding: Modified independent   Grooming: Modified independent (pt washed face and put on deoderantwhile sitting in chair with set up provided )  UE Bathing: Increased time to complete;Stand by assistance(pt able to complete UB bathing with SBA. Pt fatigued by the end of task )  LE Bathing: Moderate assistance; Increased time to complete(pt required mod A to complete LB bathing due to fatigue after completing UB bathing )  UE Dressing: Stand by assistance; Increased time to complete(pt able to don gown with SBA while sitting in chair )  LE Dressing: Moderate assistance; Increased time to complete(writer assisted pt with donning/doffing bilateral socks due to fatigue )  Toileting: Stand by assistance  Tone RUE  RUE Tone: Normotonic  Tone LUE  LUE Tone: Normotonic  Coordination  Movements Are Fluid And Coordinated: Yes     Bed mobility  Supine to Sit: (pt on EOB with PT upon arrival )  Sit to Supine: (pt retired to chair at end of session)  Scooting: Stand by assistance  Transfers  Sit to stand: Contact guard assistance  Stand to sit: Contact guard assistance  Transfer Comments: with RW      Cognition  Overall Cognitive Status: WFL     Sensation  Overall Sensation Status: WFL      LUE AROM : WFL  Left Hand AROM: WFL  RUE AROM : WFL  Right Hand AROM: WFL    LUE Strength  Gross LUE Strength: Exceptions to [facility]  L Hand Grasp: 4-/5  LUE Strength Comment: overall muscle strength 4-/5   RUE Strength  Gross RUE Strength: Exceptions to [facility]  R Hand Grasp: 4-/5  RUE Strength Comment: overall muscle strength 4-/5      Plan   Plan  Times per week: 3-4x/wk         AM-PAC Score   AM-[facility] Inpatient Daily Activity Raw Score: 18  AM-PAC Inpatient ADL T-Scale Score : 38.66  ADL Inpatient CMS 0-100% Score: 46.65  ADL Inpatient CMS G-Code Modifier : CK    Goals  Short term goals  Time Frame for Short term goals: pt will, by discharge  Short term goal 1: dem supervision during functional transfers/functional mobility with LRD, as needed  Short term goal 2: dem 5+ minutes dynamic standing tolerance with supervision in order to complete functional tasks  Short term goal 3: increase activity tolerance to 25+ minutes in order to participate in ADLs  Short term goal 4: complete LB ADLs with min A, set up and Ae, as needed  Short term goal 5: complete UB ADLs and grooming tasks with mod I and set up  Short term goal 6: dem understanding and ind initiate use of 2-3 energy conservation tech      Therapy Time   Individual Concurrent Group Co-treatment   Time In 0836         Time Out 0904         Minutes 14 Jeanine Lira OTR/L

## 2019-04-16 LAB
ANION GAP SERPL CALCULATED.3IONS-SCNC: 10 MMOL/L (ref 9–17)
BLOOD BANK SPECIMEN: NORMAL
BUN BLDV-MCNC: 8 MG/DL (ref 8–23)
BUN/CREAT BLD: ABNORMAL (ref 9–20)
CALCIUM SERPL-MCNC: 8.3 MG/DL (ref 8.6–10.4)
CHLORIDE BLD-SCNC: 109 MMOL/L (ref 98–107)
CO2: 20 MMOL/L (ref 20–31)
CREAT SERPL-MCNC: 0.69 MG/DL (ref 0.5–0.9)
GFR AFRICAN AMERICAN: >60 ML/MIN
GFR NON-AFRICAN AMERICAN: >60 ML/MIN
GFR SERPL CREATININE-BSD FRML MDRD: ABNORMAL ML/MIN/{1.73_M2}
GFR SERPL CREATININE-BSD FRML MDRD: ABNORMAL ML/MIN/{1.73_M2}
GLUCOSE BLD-MCNC: 89 MG/DL (ref 70–99)
HCT VFR BLD CALC: 23.9 % (ref 36.3–47.1)
HEMOGLOBIN: 6.9 G/DL (ref 11.9–15.1)
MCH RBC QN AUTO: 24.1 PG (ref 25.2–33.5)
MCHC RBC AUTO-ENTMCNC: 28.9 G/DL (ref 28.4–34.8)
MCV RBC AUTO: 83.6 FL (ref 82.6–102.9)
NRBC AUTOMATED: 1.2 PER 100 WBC
PDW BLD-RTO: 18.3 % (ref 11.8–14.4)
PLATELET # BLD: 434 K/UL (ref 138–453)
PMV BLD AUTO: 9.2 FL (ref 8.1–13.5)
POTASSIUM SERPL-SCNC: 4.4 MMOL/L (ref 3.7–5.3)
RBC # BLD: 2.86 M/UL (ref 3.95–5.11)
SODIUM BLD-SCNC: 139 MMOL/L (ref 135–144)
WBC # BLD: 15.3 K/UL (ref 3.5–11.3)

## 2019-04-16 PROCEDURE — 80048 BASIC METABOLIC PNL TOTAL CA: CPT

## 2019-04-16 PROCEDURE — 94640 AIRWAY INHALATION TREATMENT: CPT

## 2019-04-16 PROCEDURE — 36415 COLL VENOUS BLD VENIPUNCTURE: CPT

## 2019-04-16 PROCEDURE — 99222 1ST HOSP IP/OBS MODERATE 55: CPT | Performed by: FAMILY MEDICINE

## 2019-04-16 PROCEDURE — 86901 BLOOD TYPING SEROLOGIC RH(D): CPT

## 2019-04-16 PROCEDURE — 36430 TRANSFUSION BLD/BLD COMPNT: CPT

## 2019-04-16 PROCEDURE — 94760 N-INVAS EAR/PLS OXIMETRY 1: CPT

## 2019-04-16 PROCEDURE — 99223 1ST HOSP IP/OBS HIGH 75: CPT | Performed by: INTERNAL MEDICINE

## 2019-04-16 PROCEDURE — 86920 COMPATIBILITY TEST SPIN: CPT

## 2019-04-16 PROCEDURE — 86850 RBC ANTIBODY SCREEN: CPT

## 2019-04-16 PROCEDURE — 6360000002 HC RX W HCPCS: Performed by: FAMILY MEDICINE

## 2019-04-16 PROCEDURE — 1200000000 HC SEMI PRIVATE

## 2019-04-16 PROCEDURE — 85027 COMPLETE CBC AUTOMATED: CPT

## 2019-04-16 PROCEDURE — 99232 SBSQ HOSP IP/OBS MODERATE 35: CPT | Performed by: FAMILY MEDICINE

## 2019-04-16 PROCEDURE — P9016 RBC LEUKOCYTES REDUCED: HCPCS

## 2019-04-16 PROCEDURE — 86900 BLOOD TYPING SEROLOGIC ABO: CPT

## 2019-04-16 PROCEDURE — 6370000000 HC RX 637 (ALT 250 FOR IP): Performed by: FAMILY MEDICINE

## 2019-04-16 PROCEDURE — 2580000003 HC RX 258: Performed by: FAMILY MEDICINE

## 2019-04-16 RX ORDER — 0.9 % SODIUM CHLORIDE 0.9 %
10 VIAL (ML) INJECTION DAILY
Status: DISCONTINUED | OUTPATIENT
Start: 2019-04-16 | End: 2019-04-18

## 2019-04-16 RX ORDER — PANTOPRAZOLE SODIUM 40 MG/10ML
40 INJECTION, POWDER, LYOPHILIZED, FOR SOLUTION INTRAVENOUS DAILY
Status: DISCONTINUED | OUTPATIENT
Start: 2019-04-16 | End: 2019-04-18

## 2019-04-16 RX ORDER — 0.9 % SODIUM CHLORIDE 0.9 %
250 INTRAVENOUS SOLUTION INTRAVENOUS ONCE
Status: DISCONTINUED | OUTPATIENT
Start: 2019-04-16 | End: 2019-04-20 | Stop reason: HOSPADM

## 2019-04-16 RX ADMIN — PANTOPRAZOLE SODIUM 40 MG: 40 TABLET, DELAYED RELEASE ORAL at 08:12

## 2019-04-16 RX ADMIN — OXYCODONE HYDROCHLORIDE 10 MG: 10 TABLET, FILM COATED, EXTENDED RELEASE ORAL at 21:40

## 2019-04-16 RX ADMIN — SENNOSIDES 8.6 MG: 8.6 TABLET, FILM COATED ORAL at 21:40

## 2019-04-16 RX ADMIN — ATORVASTATIN CALCIUM 10 MG: 10 TABLET, FILM COATED ORAL at 08:13

## 2019-04-16 RX ADMIN — MOMETASONE FUROATE AND FORMOTEROL FUMARATE DIHYDRATE 2 PUFF: 200; 5 AEROSOL RESPIRATORY (INHALATION) at 08:48

## 2019-04-16 RX ADMIN — MEGESTROL ACETATE 200 MG: 40 SUSPENSION ORAL at 08:12

## 2019-04-16 RX ADMIN — Medication 10 ML: at 21:41

## 2019-04-16 RX ADMIN — MOMETASONE FUROATE AND FORMOTEROL FUMARATE DIHYDRATE 2 PUFF: 200; 5 AEROSOL RESPIRATORY (INHALATION) at 20:03

## 2019-04-16 RX ADMIN — OXYCODONE HYDROCHLORIDE 10 MG: 10 TABLET, FILM COATED, EXTENDED RELEASE ORAL at 08:12

## 2019-04-16 RX ADMIN — Medication 150 MG: at 21:39

## 2019-04-16 ASSESSMENT — PAIN DESCRIPTION - PROGRESSION

## 2019-04-16 ASSESSMENT — PAIN DESCRIPTION - PAIN TYPE: TYPE: ACUTE PAIN

## 2019-04-16 ASSESSMENT — PAIN DESCRIPTION - ORIENTATION: ORIENTATION: RIGHT;UPPER

## 2019-04-16 ASSESSMENT — PAIN SCALES - GENERAL
PAINLEVEL_OUTOF10: 4
PAINLEVEL_OUTOF10: 2
PAINLEVEL_OUTOF10: 4
PAINLEVEL_OUTOF10: 2
PAINLEVEL_OUTOF10: 2
PAINLEVEL_OUTOF10: 3

## 2019-04-16 ASSESSMENT — PAIN DESCRIPTION - DESCRIPTORS: DESCRIPTORS: ACHING;CONSTANT

## 2019-04-16 ASSESSMENT — PAIN DESCRIPTION - LOCATION: LOCATION: ABDOMEN

## 2019-04-16 ASSESSMENT — PAIN DESCRIPTION - FREQUENCY: FREQUENCY: CONTINUOUS

## 2019-04-16 ASSESSMENT — PAIN DESCRIPTION - ONSET: ONSET: ON-GOING

## 2019-04-16 NOTE — PLAN OF CARE
Problem: Falls - Risk of:  Goal: Will remain free from falls  Description  Will remain free from falls  4/16/2019 1050 by Shiv Zhou RN  Outcome: Met This Shift  4/16/2019 0458 by Roderick Winston RN  Outcome: Met This Shift  Goal: Absence of physical injury  Description  Absence of physical injury  4/16/2019 1050 by Shiv Zhou RN  Outcome: Met This Shift  4/16/2019 0458 by Roderick Winston RN  Outcome: Met This Shift     Problem: Discharge Planning:  Goal: Discharged to appropriate level of care  Description  Discharged to appropriate level of care  4/16/2019 1050 by Shiv Zhou RN  Outcome: Ongoing  4/16/2019 0458 by Roderick Winsotn RN  Outcome: Ongoing     Problem: Gas Exchange - Impaired:  Goal: Levels of oxygenation will improve  Description  Levels of oxygenation will improve  4/16/2019 1050 by Shiv Zhou RN  Outcome: Ongoing  4/16/2019 0458 by Roderick Winston RN  Outcome: Ongoing     Problem: Tissue Perfusion, Altered:  Goal: Circulatory function within specified parameters  Description  Circulatory function within specified parameters  4/16/2019 1050 by Shiv Zhou RN  Outcome: Ongoing  4/16/2019 0458 by Roderick Winston RN  Outcome: Ongoing     Problem: Pain:  Goal: Pain level will decrease  Description  Pain level will decrease  4/16/2019 1050 by Shiv Zhou RN  Outcome: Ongoing  4/16/2019 0458 by Roderick Winston RN  Outcome: Ongoing  Goal: Control of acute pain  Description  Control of acute pain  4/16/2019 1050 by Shiv Zhou RN  Outcome: Ongoing  4/16/2019 0458 by Roderick Winston RN  Outcome: Ongoing  Goal: Control of chronic pain  Description  Control of chronic pain  4/16/2019 1050 by Shiv Zhou RN  Outcome: Ongoing  4/16/2019 0458 by Roderick Winston RN  Outcome: Ongoing     Problem: Musculor/Skeletal Functional Status  Goal: Highest potential functional level  4/16/2019 1050 by Shiv Zhou RN  Outcome: Ongoing  4/16/2019 0458 by Conchis James RN  Outcome: Ongoing

## 2019-04-16 NOTE — PROGRESS NOTES
Pt's niece her and significant other her they want to have family meeting tomorrow at 3pm with MD Peterson Bejarano served Dr about above  Dr Catarina Decker MD stated ok for three [facility] [place] tomorrow  He stated he might be a little late also I perfect [facility] Idalia Callahan about meeting tomorrow

## 2019-04-16 NOTE — CONSULTS
Palliative Care Inpatient Consult    NAME:  Kati Rivera  MEDICAL RECORD NUMBER:  [id number]  AGE: 72 y.o. GENDER: female  : 1945  TODAY'S DATE:  2019    Reasons for Consultation:    Symptom and/or pain management  Provision of information regarding PC and/or hospice philosophies  Complex, time-intensive communication and interdisciplinary psychosocial support  Clarification of goals of care and/or assistance with difficult decision-making  Guidance in regards to resources and transition(s)    Members of PC team contributing to this consultation are :  Dr. Linette Hathaway palliative care attending  History of Present Illness     The patient is a 72 y.o. Non-/non  female who presents with Fatigue and Emesis      Referred to Palliative Care by   ?x Physician   ? Nursing  ? Family Request   ? Other:       She was admitted to the internal medicine service for SIRS (systemic inflammatory response syndrome) ([facility] [R65.10]. Her hospital course has been associated with SIRS (systemic inflammatory response syndrome) ([facility] The patient has a complicated medical history and has been hospitalized since 2019 12:21 PM. 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 Patient recently had port placement and is waiting for chemotherapy. Patient was brought to the ER by friend for right upper quadrant pain, increased fatigue, nausea persistent along with nonbloody nonbilious emesis. Patient also has lost significant weight in the last few months. Patient has been admitted patient has been admitted for the management of SIRS (systemic inflammatory response syndrome). Chest x-ray showed no acute cardiopulmonary process. Oncology has been consulted. Palliative care consulted for goals of care and code status.     Active Hospital Problems    Diagnosis Date Noted    Lactic acid acidosis [E87.2] 2019    SIRS (systemic inflammatory response syndrome) (HCC) [R65.10] Oral Nightly    docusate sodium  100 mg Oral Daily    mometasone-formoterol  2 puff Inhalation BID    fluticasone  1 spray Nasal Daily    cetirizine  10 mg Oral Daily    [Held by provider] amLODIPine  5 mg Oral Nightly    atorvastatin  10 mg Oral Daily    sodium chloride flush  10 mL Intravenous 2 times per day    megestrol  200 mg Oral Daily    oxyCODONE  10 mg Oral 2 times per day     albuterol sulfate HFA, cromolyn, sodium chloride, nicotine polacrilex, sodium chloride flush, acetaminophen, oxyCODONE-acetaminophen, ondansetron  IV Drips/Infusions    Home Medications  No current facility-administered medications on file prior to encounter. Current Outpatient Medications on File Prior to Encounter   Medication Sig Dispense Refill    oxyCODONE-acetaminophen (PERCOCET) 5-325 MG per tablet Take 1 tablet by mouth every 6 hours as needed for Pain.  atorvastatin (LIPITOR) 40 MG tablet take 1 tablet by mouth once daily 30 tablet 2    Nutritional Supplements (ENSURE ORIGINAL) LIQD Take 1 Can by mouth 3 times daily 90 Bottle 5    amLODIPine (NORVASC) 5 MG tablet take 1 tablet by mouth once daily 30 tablet 3    valsartan-hydrochlorothiazide (DIOVAN-HCT) 160-25 MG per tablet take 1 tablet by mouth once daily 30 tablet 2    albuterol sulfate HFA (PROAIR HFA) 108 (90 Base) MCG/ACT inhaler Inhale 2 puffs into the lungs every 6 hours as needed for Wheezing 2 Inhaler 2    budesonide-formoterol (SYMBICORT) 160-4.5 MCG/ACT AERO Inhale 2 puffs into the lungs 2 times daily 1 Inhaler 3    cromolyn (OPTICROM) 4 % ophthalmic solution Place 1 drop into both eyes 4 times daily 1 Bottle 2    zoster recombinant adjuvanted vaccine (SHINGRIX) 50 MCG SUSR injection 50 MCG IM then repeat 2-6 months.  0.5 mL 1       Data         BP (!) 97/59 Comment: rn jenny notified  Pulse 102   Temp 97.7 °F (36.5 °C) (Oral)   Resp 16   Ht 5' 3\" (1.6 m)   Wt 168 lb 6.4 oz (76.4 kg)   SpO2 99%   BMI 29.83 kg/m²     Wt Readings Scale:  _x__60%  Ambulation reduced; Significant disease; Can't do hobbies/housework; intake normal or reduced; occasional assist; LOC full/confusion  ___50%  Mainly sit/lie; Extensive disease; Can't do any work; Considerable assist; intake normal or reduced; LOC full/confusion  ___40%  Mainly in bed; Extensive disease; Mainly assist; intake normal or reduced; LOC full/confusion   ___30%  Bed Bound; Extensive disease; Total care; intake reduced; LOCfull/confusion  ___20%  Bed Bound; Extensive disease; Total care; intake minimal; Drowsy/coma  ___10%  Bed Bound; Extensive disease;  Total care; Mouth care only; Drowsy/coma  ___0       Death      Plan      Palliative Interaction:  - The patient was seen today and was lying comfortably in the bed getting blood transfusion    - Patient's friend Wilman Grullon was also present in patient's room    - I discussed patient's current medical conditions and patient seems to be aware that she has cancer which has spread to her liver    - Patient told that she is not  but has 1 legally adopted daughter who lives in [facility] but the patient has not been in contact with her since many years    - I explained the significance of POA paperwork for health to the patient and she stated that she wanted her niece named Miranda Odonnell to be her POA for health    - I extends to the patient that spiritual care team can be consulted for completing the POA paperwork and patient wanted them to be contacted    - I explained the different types of code status to the patient and patient as well as her Wilman Yadi told that she wanted everything to be done to save her life    - I told the patient that her code status will remain as full code unless she wanted  It to be changed    - I explained the role of palliative care to the patient    - I offered comfort and emotional support to the patient    Education/support to staff  Education/support to family  Education/support to patient  Discharge planning/helping to you for allowing Palliative Care to participate in the care of Ms. GALVEZ . This note has been dictated by dragon, typing errors may be a possibility.   The total time I spent in seeing the patient and discussing the goals of care, code status, advance directives and other major issues was more than 60 minutes    Electronically signed by   Dilip Acosta MD  Palliative Care Team  on 4/16/2019 at 9:23 AM    Palliative care office: [phone]

## 2019-04-16 NOTE — PROGRESS NOTES
or hearing problem, no dysphagia or sore throat   Respiratory: No chest pain, no shortness of breath, no cough or hemoptysis. Cardiovascular: Denies chest pain, PND or orthopnea. No L E swelling or palpitations. Gastrointestinal:    abd pain, nausea and vomiting. Genitourinary: Denies dysuria, hematuria, frequency, urgency or incontinence. Neurological: Denies headaches, decreased LOC, no sensory or motor focal deficits. Musculoskeletal:  No arthralgia no back pain or joint swelling. Skin: There are no rashes or bleeding. Psychiatric:  No anxiety, no depression. Endocrine: no diabetes or thyroid disease. Hematologic: no bleeding , no adenopathy. PHYSICAL EXAM:      /69   Pulse 95   Temp 98.6 °F (37 °C) (Oral)   Resp 14   Ht 5' 3\" (1.6 m)   Wt 168 lb 6.4 oz (76.4 kg)   SpO2 98%   BMI 29.83 kg/m²    Temp (24hrs), Av °F (36.7 °C), Min:97.7 °F (36.5 °C), Max:98.6 °F (37 °C)      Physical Exam  Gen. Exam, showed a well-appearing patient without evidence of distress or pain, cachectic, weak   HEENT, normocephalic and atraumatic, PERRLA extraocular muscles are intact  Neck showed no JVD no carotid bruit, no cervical adenopathy  Chest is clear to auscultation bilaterally  Heart is regular without any murmur  Abdomen mild diffuse tenderness   Lower extremities showed no edema clubbing or cyanosis  Neurological examination was nonfocal, with intact cranial nerves  Skin  No rashes or bruising appreciated          DATA:      Labs:       CBC:   Recent Labs     04/15/19  1126 19  0713   WBC 14.8* 15.3*   HGB 7.2* 6.9*   HCT 26.4* 23.9*    434     BMP:   Recent Labs     04/15/19  1126 19  0713    139   K 3.5* 4.4   CO2 17* 20   BUN 9 8   CREATININE 0.71 0.69   LABGLOM >60 >60   GLUCOSE 88 89     PT/INR:   Recent Labs     19  0517   PROTIME 14.3*   INR 1.4     APTT:No results for input(s): APTT in the last 72 hours.   LIVER PROFILE:  Recent Labs

## 2019-04-16 NOTE — PROGRESS NOTES
Mahsa Bowling 21    Progress Note    4/16/2019    7:44 AM    Name:   Phani Lora  MRN:     [id number]     [place]:      [de-identified]   Room:   [address] [place] [address] Day:  3  Admit Date:  4/13/2019 12:21 PM    PCP:   Steve Guerrero MD  Code Status:  Full Code    Subjective:     C/C:   Chief Complaint   Patient presents with    Fatigue    Emesis     Interval History Status: improved. Patient seen and examined at bedside, no acute events overnight. Continue to improve overall , low appetite   Hb is low this am  Patient denies any chest pain, shortness of breath, chills, fevers, nausea or vomiting. Patient vitals, labs  were reviewed,from overnight shift and morning updates were noted and discussed with the nurse    Brief History:     Phani Lora is 72 y.o. female  Who was admitted to the hospital on 4/13/2019 for treatment of SIRS (systemic inflammatory response syndrome) ([facility] [address] Patient was brought to emergency room by friend for right upper quadrant pain, poor oral intake, loss of appetite, weight loss. 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  Patient had recent port placement and is waiting for chemotherapy.  Abdominal pain was severe, right upper quadrant, nonradiating, no worsening or relieving factors.  She had poor appetite, easy satiety, nausea persistent along with nonbloody nonbilious emesis.  Patient denies any fever, chills, difficulty breathing, wheezing, sputum production, dysuria, diarrhea.  She had significant weight loss in last few months.  Initial evaluation showed pulse 96, blood pressure 105/71, temperature 98. 1.  Lab work showed BUN 27, creatinine 1.2, lactic acid 3.3, albumin 2.8, bilirubin 0.9, , ALT 58, alk phos 668, WBC of 18.8, hemoglobin 8.8, platelet 637.  Chest x-ray showed no acute cardiac pulmonary process.  UA was negative for UTI .    Patient was treated Recent Labs     04/13/19  1241 04/13/19  1303 04/14/19  0517 04/15/19  1126   PROT  --  7.4 5.9* 5.7*   LABALBU  --  2.8* 2.2* 1.9*   AST  --  223* 154* 140*   ALT  --  58* 45* 46*   ALKPHOS  --  668* 549* 501*   BILITOT  --  0.90 0.79 0.69   LIPASE  --  22  --   --    POCGLU 100  --   --   --          Lab Results   Component Value Date/Time    SPECIAL NOT REPORTED 04/13/2019 07:16 PM     Lab Results   Component Value Date/Time    CULTURE NO SIGNIFICANT GROWTH 04/13/2019 07:16 PM       Lab Results   Component Value Date    FIO2 NOT REPORTED 04/13/2019       Radiology:        Physical Examination:        General appearance:  alert, cooperative and no distress  Mental Status:  oriented to person, place and time and normal affect  Lungs:  clear to auscultation bilaterally, normal effort, port noted  Heart:  regular rate and rhythm, no murmur  Abdomen:  soft, nontender, nondistended, normal bowel sounds, no masses, hepatomegaly, splenomegaly  Extremities:  no edema, redness, tenderness in the calves  Skin:  no gross lesions, rashes, induration    Assessment:        Primary Problem  S[facility] (systemic inflammatory response syndrome) ([facility]    Active Hospital Problems    Diagnosis Date Noted    Lactic acid acidosis [E87.2] 04/13/2019    SIRS (systemic inflammatory response syndrome) (HCC) [R65.10] 04/13/2019    Dehydration [E86.0] 04/13/2019    Moderate malnutrition (HCC) [E44.0] 04/13/2019    Loss of appetite [R63.0] 04/13/2019    Right upper quadrant pain [R10.11] 04/13/2019    Liver metastasis (HCC) [C78.7]     Duodenal adenocarcinoma (HCC) [C17.0]     Chronic bronchitis (HCC) [J42] 05/25/2017    Stage 3 chronic kidney disease ([facility] [N18.3] 06/09/2015    Essential hypertension [I10] 01/27/2012    Mitral valve prolapse [I34.1] 01/27/2012       Plan:        Acute on chronic anemia : Hb 6.9, Tx 1 U PRBCs, get FOBT and given her adenocarcinoma concerns of GI bleed is ongoing, will involve GI and start PPI IV twice    SIRS: roled out    Lactic acidosis : resolved with hydration, DC fluids     Dehydration: resolved, advance diet    MARIA VICTORIA: resolved    Hypokalemia: replace    Metastatic duodenal adenocarcinoma with metastases to liver: Plan for outpatient chemo on 4/17, given the update will involve oncology here     Moderate protein calorie malnutrition secondary to decreased appetite: Continue on boost supplements, continue megace    DVT and GI prophylaxis    Discharge planning      Mira Miramontes MD  4/16/2019  7:44 AM

## 2019-04-16 NOTE — CONSULTS
THE [facility]  Consultation Note     . REASON FOR CONSULTATION:    Acute on chronic anemia, duodenal adenocarcinoma  HISTORY OF PRESENT ILLNESS:     This is a 72 y.o. female who presents with SIRS 4/13/2019 with additional complaints of poor appetite, unintentional weight loss and RUQ abdominal pain-non-radiating, dull , and constant that has been present for approx 2 months. 2/2/2019 Pt had EGD per Dr. Mireya Oconnor that  Revealed invasive adenocarcinoma of the duodenum with bx indicating duodenum as primary source with mets to liver after liver bx were also obtained. It was noted that this mass was visibly friable and may ooze. Upon arrival her Hgb was  8.8 and slowly down trended to 6.9 this am. She is to receive 1 unit PRBC's. Rectal exam at bedside this am only revealed brown stool. , INR 1.4,   No hematemesis, melena or hematochezia reported per pt or staff since admission. Pt was seen per HemOnc on last admission in [facility] is she followed up as OP, but does now have port in place. No NSAIDs use, smoking or ETOH currently per pt. Previous GI history:     2/22/2019 EGD per Dr. Mireay Oconnor:  POSTOPERATIVE DIAGNOSIS:      1-Large flat carpet like duodenal mass, circumferential extending from the D1 to D2, non-obstructive lesions, friable with polypoid features. Several biopsies taken for histopath. 2-Area of necrotic appearing ulcerative mass with dense base in D2 that was splayed open using an endoscopic cap to obtain better tissue acquisition. Duodenal biopsies:  -- Diagnosis --   DUODENUM, MASS, BIOPSIES:             -  WELL TO MODERATELY DIFFERENTIATED INVASIVE ADENOCARCINOMA   ARISING IN A BACKGROUND OF VILLOUS ADENOMA. Stacy Blackwell M.D.   **Electronically Signed Out**         jet/2/25/2019     Pathology:  -- Diagnosis --   LIVER, CT-GUIDED CORE NEEDLE BIOPSIES:   - METASTATIC ADENOCARCINOMA, CONSISTENT WITH DUODENAL PRIMARY.    - SEE COMMENT. -- Diagnosis Comment --   RECENT DUODENAL BIOPSIES DEMONSTRATED INVASIVE ADENOCARCINOMA ARISING   IN A BACKGROUND OF VILLOUS ADENOMA.  THE METASTATIC ADENOCARCINOMA IN   THE LIVER BIOPSIES IS CONSISTENT WITH THIS DUODENAL PRIMARY.      Berhane Espinosa,   **Electronically Signed Out**         sls/2/26/2019   PAST MEDICAL HISTORY:  Past Medical History:   Diagnosis Date    Alcohol abuse 7/20/2012    Duodenal adenocarcinoma ([facility]     Hyperlipidemia 6/9/2015    Hypertension     Liver metastasis ([address]     Liver metastasis ([address]     Pancreatitis     Stone, kidney      Past Surgical History:   Procedure Laterality Date    HYSTERECTOMY      PANCREAS SURGERY      SUSAN AND BSO      UPPER GASTROINTESTINAL ENDOSCOPY N/A 2/20/2019    EGD BIOPSY performed by Ropoa Elizabeth MD at [facility]  2/20/2019    EGD CONTROL HEMORRHAGE performed by Roopa Elizabeth MD at [address] [place] [address] N/A 2/22/2019    EGD BIOPSY performed by Roopa Elizabeth MD at [facility]       ALLERGIES:  Allergies   Allergen Reactions    Aspirin Anaphylaxis    Penicillins     Iv Dye [Iodides] Hives       HOME MEDICATIONS:  Prior to Admission medications    Medication Sig Start Date End Date Taking? Authorizing Provider   oxyCODONE-acetaminophen (PERCOCET) 5-325 MG per tablet Take 1 tablet by mouth every 6 hours as needed for Pain.    Yes Historical Provider, MD   atorvastatin (LIPITOR) 40 MG tablet take 1 tablet by mouth once daily 4/5/19   Jose G Williamson MD   Nutritional Supplements (ENSURE ORIGINAL) LIQD Take 1 Can by mouth 3 times daily 3/7/19   Brandi Adam MD   amLODIPine (NORVASC) 5 MG tablet take 1 tablet by mouth once daily 3/5/19   Jose G Williamson MD   valsartan-hydrochlorothiazide (DIOVAN-HCT) 160-25 MG per tablet take 1 tablet by mouth once daily 1/22/19   Jose G Williamson MD   albuterol sulfate HFA (PROAIR HFA) 108 (90 Base) MCG/ACT inhaler Inhale 2 puffs into the lungs every 6 hours as needed for Wheezing 10/19/18   Raffy Tellez MD   budesonide-formoterol (SYMBICORT) 160-4.5 MCG/ACT AERO Inhale 2 puffs into the lungs 2 times daily 10/19/18   Raffy Tellez MD   cromolyn (OPTICROM) 4 % ophthalmic solution Place 1 drop into both eyes 4 times daily 10/19/18   Raffy Tellez MD   zoster recombinant adjuvanted vaccine (SHINGRIX) 50 MCG SUSR injection 50 MCG IM then repeat 2-6 months. 7/17/18 7/17/19  Juan Quiros MD     .  CURRENT MEDICATIONS:  Scheduled Meds:   sodium chloride  250 mL Intravenous Once    pantoprazole  40 mg Intravenous Daily    And    sodium chloride (PF)  10 mL Intravenous Daily    iron polysaccharides  150 mg Oral BID    senna  1 tablet Oral Nightly    docusate sodium  100 mg Oral Daily    mometasone-formoterol  2 puff Inhalation BID    fluticasone  1 spray Nasal Daily    cetirizine  10 mg Oral Daily    [Held by provider] amLODIPine  5 mg Oral Nightly    atorvastatin  10 mg Oral Daily    sodium chloride flush  10 mL Intravenous 2 times per day    megestrol  200 mg Oral Daily    oxyCODONE  10 mg Oral 2 times per day     . Continuous Infusions:  . PRN Meds:albuterol sulfate HFA, cromolyn, sodium chloride, nicotine polacrilex, sodium chloride flush, acetaminophen, oxyCODONE-acetaminophen, ondansetron  .   SOCIAL HISTORY:     Social History     Socioeconomic History    Marital status: Single     Spouse name: Not on file    Number of children: Not on file    Years of education: Not on file    Highest education level: Not on file   Occupational History    Not on file   Social Needs    Financial resource strain: Not on file    Food insecurity:     Worry: Not on file     Inability: Not on file    Transportation needs:     Medical: Not on file     Non-medical: Not on file   Tobacco Use    Smoking status: Former Smoker     Packs/day: 0.20     Years: 45.00     Pack years: 9.00     Types: Cigarettes     Last attempt to quit: 2014     Years since quittin.7    Smokeless tobacco: Never Used   Substance and Sexual Activity    Alcohol use: Yes     Alcohol/week: 0.6 oz     Types: 1 Cans of beer per week    Drug use: No    Sexual activity: Not on file   Lifestyle    Physical activity:     Days per week: Not on file     Minutes per session: Not on file    Stress: Not on file   Relationships    Social connections:     Talks on phone: Not on file     Gets together: Not on file     Attends Evangelical service: Not on file     Active member of club or organization: Not on file     Attends meetings of clubs or organizations: Not on file     Relationship status: Not on file    Intimate partner violence:     Fear of current or ex partner: Not on file     Emotionally abused: Not on file     Physically abused: Not on file     Forced sexual activity: Not on file   Other Topics Concern    Not on file   Social History Narrative    Not on file       FAMILY HISTORY:   History reviewed. No pertinent family history. REVIEW OF SYSTEMS:     Constitutional: poor appetite, unintentional weight loss over 3 months  HEENT:  No headache, otalgia, itchy eyes, nasal discharge or sore throat. Cardiac:  No chest pain, dyspnea, orthopnea or PND. Chest:   No cough, phlegm or wheezing. Abdomen:  Mid abdominal pain-dull, constant, no nausea, no vomiting, occasional constipation, no diarrhea, no hematochezia/melena  Neuro:  No focal weakness, abnormal movements or seizure like activity. Skin:   No rashes, no itching. :   No hematuria, no pyuria, no dysuria, no flank pain. Extremities:  No swelling or joint pains. ROS was otherwise negative except as mentioned in the [address] PHYSICAL EXAM:    BP 99/63   Pulse 92   Temp 98.1 °F (36.7 °C) (Oral)   Resp 18   Ht 5' 3\" (1.6 m)   Wt 168 lb 6.4 oz (76.4 kg)   SpO2 98%   BMI 29.83 kg/m²   . TMAX[24]    General: Well developed, Well nourished, flat affect  Head:  Normocephalic, Atraumatic  EENT: EOMI, Sclera not icteric, Oropharynx moist  Neck:  Supple, Trachea midline  Lungs:CTA Bilaterally  Heart: RRR, No murmur, No rub, No gallop, PMI nondisplaced. Abdomen:Soft, slightly tender in mid abdomen, Not distended, BS WNL,  No masses. No hepatomegalia   Ext:No clubbing. No cyanosis. No edema. Skin: No rashes. No jaundice. No stigmata of liver disease. Neuro:  A&O x Three, No focal neurological deficits    LABS and IMAGING:     Hemotological labs:   ANEMIA STUDIES  Recent Labs     04/13/19  1303 04/14/19  0517   LABIRON  --  11*   TIBC  --  142*   FERRITIN  --  3,965*   XYSBEDLC24 >2000*  --    FOLATE 10.9  --        CBC  Recent Labs     04/13/19  1303 04/14/19  0517 04/15/19  1126 04/16/19  0713   WBC 18.8* 16.9* 14.8* 15.3*   HGB 8.8* 7.1* 7.2* 6.9*   MCV 83.1 84.5 88.3 83.6   RDW 18.1* 18.0* 18.5* 18.3*   * 442 442 434       PT/INR  Recent Labs     04/13/19  1303 04/14/19  0517   PROTIME 12.5* 14.3*   INR 1.2 1.4       BMP  Recent Labs     04/14/19  0517 04/15/19  1126 04/16/19  0713    141 139   K 3.9 3.5* 4.4    111* 109*   CO2 22 17* 20   BUN 19 9 8   CREATININE 0.90 0.71 0.69   GLUCOSE 107* 88 89   CALCIUM 8.1* 7.9* 8.3*       LIVER WORK UP  Hepatitis Functional Panel:  Recent Labs     04/15/19  1126   ALKPHOS 501*   ALT 46*   *   PROT 5.7*   BILITOT 0.69   LABALBU 1.9*       AMYLASE/LIPASE/AMMONIA  Recent Labs     04/13/19  1303   LIPASE 22       Acute Hepatitis Panel   Lab Results   Component Value Date    HEPBSAG NONREACTIVE 07/01/2015    HEPCAB NONREACTIVE 07/01/2015       Cancer Markers:  CEA:    No results for input(s): CEA in the last 72 hours. Ca 125:   No results for input(s):  in the last 72 hours. Ca 19-9:     Invalid input(s):   AFP: No results for input(s): AFP in the last 72 hours.      Principal Problem:    SIRS (systemic inflammatory response syndrome) (HCC)  Active Problems:    Essential hypertension    Mitral valve prolapse    Stage 3 chronic kidney disease (HCC)    Chronic bronchitis (HCC)    Liver metastasis (HCC)    Duodenal adenocarcinoma (HCC)    Lactic acid acidosis    Dehydration    Moderate malnutrition (HCC)    Loss of appetite    Right upper quadrant pain  Resolved Problems:    * No resolved hospital problems. *       Assessment:  72 yr old female admitted with SIRS 4/13/2019 with additional complaints of poor appetite, unintentional weight loss and RUQ abdominal pain-non-radiating, dull , and constant that has been present for approx 2 months. 2/2/2019 Pt had EGD per Dr. Gaby Singh that  Revealed invasive adenocarcinoma of the duodenum with bx indicating duodenum as primary source with mets to liver after liver bx were also obtained. It was noted that this mass was visibly friable and may ooze.  -No overt s/s of bleeding. Acute drop in Hgb level most likely related to friable mass. Unfortunately, repeat endoscopy unlikely to stop oozing. Plan of care:  1. Will maximize PPI to Protonix 40 mg po 30 minutes prior to meals  2. Will add Carafte 1 gr every six hrs  3. Rec dietician consult to maximize oral intake for strength and over all nutritional status  4. Rec avoiding all NSAIDS,   5. Cont medical mgt for SIRS per primary  6. Cont supplemental iron  7. Rec HemOnc consult inpatient for additional recs  8. Diet as tolerated-no role for endoscopy at this time  9.  Will cont to follow    This plan was formulated in collaboration with Dr. Gaby Singh MD    Electronically signed by:  Liyah Lauren CNP, [facility]  [phone]  4/16/2019    12:01 PM

## 2019-04-16 NOTE — CARE COORDINATION
Palliative Care spoke with patient, remains a full code, [facility] Gerald Guevara  [phone], fax [phone] is asking to please [facility] paperwork and to call [facility]. LM for Marbella Combs to update her.

## 2019-04-17 LAB
ABO/RH: NORMAL
ANTIBODY SCREEN: NEGATIVE
ARM BAND NUMBER: NORMAL
BLD PROD TYP BPU: NORMAL
CROSSMATCH RESULT: NORMAL
DISPENSE STATUS BLOOD BANK: NORMAL
EXPIRATION DATE: NORMAL
HCT VFR BLD CALC: 26.1 % (ref 36.3–47.1)
HEMOGLOBIN: 7.8 G/DL (ref 11.9–15.1)
MCH RBC QN AUTO: 24.8 PG (ref 25.2–33.5)
MCHC RBC AUTO-ENTMCNC: 29.9 G/DL (ref 28.4–34.8)
MCV RBC AUTO: 83.1 FL (ref 82.6–102.9)
NRBC AUTOMATED: 0.9 PER 100 WBC
PDW BLD-RTO: 17.9 % (ref 11.8–14.4)
PLATELET # BLD: 373 K/UL (ref 138–453)
PMV BLD AUTO: 9.3 FL (ref 8.1–13.5)
RBC # BLD: 3.14 M/UL (ref 3.95–5.11)
TRANSFUSION STATUS: NORMAL
UNIT DIVISION: 0
UNIT NUMBER: NORMAL
WBC # BLD: 15.2 K/UL (ref 3.5–11.3)

## 2019-04-17 PROCEDURE — 99233 SBSQ HOSP IP/OBS HIGH 50: CPT | Performed by: INTERNAL MEDICINE

## 2019-04-17 PROCEDURE — C9113 INJ PANTOPRAZOLE SODIUM, VIA: HCPCS | Performed by: FAMILY MEDICINE

## 2019-04-17 PROCEDURE — 94762 N-INVAS EAR/PLS OXIMTRY CONT: CPT

## 2019-04-17 PROCEDURE — 6370000000 HC RX 637 (ALT 250 FOR IP): Performed by: FAMILY MEDICINE

## 2019-04-17 PROCEDURE — 99232 SBSQ HOSP IP/OBS MODERATE 35: CPT | Performed by: FAMILY MEDICINE

## 2019-04-17 PROCEDURE — 1200000000 HC SEMI PRIVATE

## 2019-04-17 PROCEDURE — 85027 COMPLETE CBC AUTOMATED: CPT

## 2019-04-17 PROCEDURE — 2580000003 HC RX 258: Performed by: FAMILY MEDICINE

## 2019-04-17 PROCEDURE — APPNB45 APP NON BILLABLE 31-45 MINUTES: Performed by: INTERNAL MEDICINE

## 2019-04-17 PROCEDURE — 94640 AIRWAY INHALATION TREATMENT: CPT

## 2019-04-17 PROCEDURE — 6360000002 HC RX W HCPCS: Performed by: FAMILY MEDICINE

## 2019-04-17 PROCEDURE — 97530 THERAPEUTIC ACTIVITIES: CPT

## 2019-04-17 PROCEDURE — 36415 COLL VENOUS BLD VENIPUNCTURE: CPT

## 2019-04-17 PROCEDURE — 99497 ADVNCD CARE PLAN 30 MIN: CPT | Performed by: FAMILY MEDICINE

## 2019-04-17 RX ADMIN — Medication 150 MG: at 09:19

## 2019-04-17 RX ADMIN — MOMETASONE FUROATE AND FORMOTEROL FUMARATE DIHYDRATE 2 PUFF: 200; 5 AEROSOL RESPIRATORY (INHALATION) at 21:08

## 2019-04-17 RX ADMIN — CETIRIZINE HYDROCHLORIDE 10 MG: 10 TABLET ORAL at 09:18

## 2019-04-17 RX ADMIN — Medication 150 MG: at 20:28

## 2019-04-17 RX ADMIN — OXYCODONE HYDROCHLORIDE 10 MG: 10 TABLET, FILM COATED, EXTENDED RELEASE ORAL at 09:20

## 2019-04-17 RX ADMIN — DOCUSATE SODIUM 100 MG: 100 CAPSULE, LIQUID FILLED ORAL at 09:18

## 2019-04-17 RX ADMIN — OXYCODONE HYDROCHLORIDE 10 MG: 10 TABLET, FILM COATED, EXTENDED RELEASE ORAL at 20:28

## 2019-04-17 RX ADMIN — PANTOPRAZOLE SODIUM 40 MG: 40 INJECTION, POWDER, LYOPHILIZED, FOR SOLUTION INTRAVENOUS at 09:20

## 2019-04-17 RX ADMIN — MEGESTROL ACETATE 200 MG: 40 SUSPENSION ORAL at 09:20

## 2019-04-17 RX ADMIN — Medication 10 ML: at 20:29

## 2019-04-17 RX ADMIN — Medication 10 ML: at 09:37

## 2019-04-17 RX ADMIN — ATORVASTATIN CALCIUM 10 MG: 10 TABLET, FILM COATED ORAL at 09:18

## 2019-04-17 RX ADMIN — SENNOSIDES 8.6 MG: 8.6 TABLET, FILM COATED ORAL at 20:28

## 2019-04-17 RX ADMIN — Medication 10 ML: at 09:22

## 2019-04-17 ASSESSMENT — PAIN DESCRIPTION - PAIN TYPE
TYPE: ACUTE PAIN

## 2019-04-17 ASSESSMENT — PAIN DESCRIPTION - ORIENTATION
ORIENTATION: ANTERIOR;RIGHT
ORIENTATION: RIGHT
ORIENTATION: ANTERIOR;RIGHT

## 2019-04-17 ASSESSMENT — PAIN SCALES - GENERAL
PAINLEVEL_OUTOF10: 0
PAINLEVEL_OUTOF10: 0
PAINLEVEL_OUTOF10: 3
PAINLEVEL_OUTOF10: 0
PAINLEVEL_OUTOF10: 3
PAINLEVEL_OUTOF10: 3
PAINLEVEL_OUTOF10: 0
PAINLEVEL_OUTOF10: 2
PAINLEVEL_OUTOF10: 3
PAINLEVEL_OUTOF10: 3

## 2019-04-17 ASSESSMENT — PAIN DESCRIPTION - PROGRESSION

## 2019-04-17 ASSESSMENT — PAIN DESCRIPTION - ONSET
ONSET: ON-GOING

## 2019-04-17 ASSESSMENT — PAIN DESCRIPTION - FREQUENCY
FREQUENCY: INTERMITTENT

## 2019-04-17 ASSESSMENT — PAIN DESCRIPTION - DESCRIPTORS
DESCRIPTORS: ACHING

## 2019-04-17 ASSESSMENT — PAIN DESCRIPTION - LOCATION
LOCATION: ABDOMEN

## 2019-04-17 ASSESSMENT — PAIN - FUNCTIONAL ASSESSMENT: PAIN_FUNCTIONAL_ASSESSMENT: PREVENTS OR INTERFERES SOME ACTIVE ACTIVITIES AND ADLS

## 2019-04-17 NOTE — PROGRESS NOTES
Physical Therapy  Facility/Department: [address] ORTHO/MED SURG  Daily Treatment Note  NAME: Kemi Donohue  : 1945  MRN: [id number]    Date of Service: 2019    Discharge Recommendations:    Further therapy recommended at discharge. Patient Diagnosis(es): The primary encounter diagnosis was Septicemia ([facility] Diagnoses of Nausea and vomiting, intractability of vomiting not specified, unspecified vomiting type, Dehydration, Right upper quadrant abdominal pain, Liver metastasis ([address] and Duodenal adenocarcinoma ([address] were also pertinent to this visit. 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 has a past surgical history that includes Hysterectomy; syl and bso (cervix removed); Pancreas surgery; Upper gastrointestinal endoscopy (N/A, 2019); Upper gastrointestinal endoscopy (2019); and Upper gastrointestinal endoscopy (N/A, 2019). Restrictions  Restrictions/Precautions  Restrictions/Precautions: Fall Risk  Required Braces or Orthoses?: No  Position Activity Restriction  Other position/activity restrictions: up with assist, up as tolerated   Subjective   General  Chart Reviewed: Yes  Response To Previous Treatment: Patient with no complaints from previous session. Family / Caregiver Present: No  Subjective  Subjective: Pt alert in bed; reports 3/10 pain \"in the same place as always\". Agreeable to therapy  General Comment  Comments: Pt left in chair with alarm activated; call light in reach.    Pain Screening  Patient Currently in Pain: Yes  Pain Assessment  Pain Assessment: 0-10  Pain Level: 3  Pain Type: Acute pain  Pain Location: Abdomen  Pain Orientation: Right  Pain Descriptors: Aching  Functional Pain Assessment: Prevents or interferes some active activities and ADLs  Vital Signs  Patient Currently in Pain: Yes Orientation  Orientation  Overall Orientation Status: Within Normal Limits  Cognition      Objective   Bed mobility  Supine to Sit: Supervision  Sit to Supine: Supervision  Scooting: Supervision  Transfers  Sit to Stand: Stand by assistance  Stand to sit: Contact guard assistance(difficulty taking steps back towards chair; had to give mod VC for safety)  Bed to Chair: Minimal assistance  Ambulation  Ambulation?: Yes  Ambulation 1  Surface: level tile  Device: No Device(pt declining use of RW- states \"I dont use one at home\")  Assistance: Minimal assistance;Contact guard assistance  Quality of Gait: widened FLOR, reaching for handrail, shuffling gaits, decreased step length bilaterally, very slow pace  Distance: 40ft  Comments: 1 brief standing rest break  Stairs/Curb  Stairs?: No     Balance  Posture: Good  Sitting - Static: Good  Sitting - Dynamic: Good;-  Standing - Static: Fair  Standing - Dynamic: Fair;-       Exercises:  Seated LE exercise program: Long Arc Quads, hip abduction/adduction, heel/toe raises, and marches. Reps: 10x each, fatigued very quickly     Assessment   Body structures, Functions, Activity limitations: Decreased functional mobility ; Decreased strength;Decreased endurance;Decreased safe awareness;Decreased balance  Assessment: Pt unsafe to ambulate without assistance. Very decreased endurance and significant balance deficits. Prognosis: Good  Patient Education: HEP, safety with mobility, importance of utilizing RW to decrease fall risk- pt did not appear receptive to education  REQUIRES PT FOLLOW UP: Yes  Activity Tolerance  Activity Tolerance: Patient limited by endurance; Patient limited by fatigue      Goals  Short term goals  Time Frame for Short term goals: 10 visits  Short term goal 1: Transfers with SBA  Short term goal 2: amb 125 ft with a RW x SBA  Short term goal 3: ascend/descend 4 steps with SBA  Short term goal 4: 20-30 min exercise programx SBA  Patient Goals   Patient goals : Return home    Plan    Plan  Times per week: 5-6x wk  Current Treatment Recommendations: Strengthening, Transfer Training, Endurance Training, Gait Training, Functional Mobility Training, Stair training, Safety Education & Training  Safety Devices  Type of devices:  All fall risk precautions in place, Gait belt, Patient at risk for falls, Call light within reach, Chair alarm in place, Left in chair, Nurse notified  Restraints  Initially in place: No     Therapy Time   Individual Concurrent Group Co-treatment   Time In [address]         Time Out 0211         Minutes 1800 [facility], [facility]

## 2019-04-17 NOTE — PATIENT CARE CONFERENCE
Palliative Care Family Conference    Patient: Shelton Rothman  Room: 9451/9567-82    Attended By: Dr. Fany Carrillo care attending, patient herself, patient's friend Janet Longoria, patient's niece Spike Johnsont, patient's sister and other family members     Reason for meeting:Routine meeting  Discuss goals of care  Provide clinical updates and answer questions  Share information and provider education for the family  Listen to patient/family concerns  Assess family understanding, concerns, and coping  Provide emotional support to the family  Other major care decisions  Code status     Conference Summary:  - The meeting was held in the patient's room on the 2nd floor    - The meeting was conducted by me and attended by patient herself, patient's friend Janetramon Longoria, patient's niece Spike Johnsont who also is patient's POA for health, patient's sister and other family members    - Patient's current medical conditions were discussed and I informed the patient and family that their oncology doctor Dr. Palomo Brown will be meeting with them soon to update them regarding patient's current medical condition    - I explained the significance of POA paperwork to patient's niece Spike Jones who has been made as patient's POA for health by the patient herself    - Spike Jones told that she has the copy of the patient's POA paperwork for health with her at home    - I explained the different types of code status to the patient as well as to Spike Jones and informed Spike Jones that this will give her a better understanding about the codes    - I offered comfort and emotional support to the patient and family      Conclusion/Plan:  - We will continue to provide comfort and support for the patient and family    The total time I spent in face-to-face family meeting discussing goals of care, advanced directives and code status was more than 30 minutes  The note has been dictated by dragon, typing errors may be a possibility.        Electronically signed by   Anuj Still MD  Palliative Care Team  on 4/17/2019 at 5:05 PM

## 2019-04-17 NOTE — PLAN OF CARE
Problem: Discharge Planning:  Goal: Discharged to appropriate level of care  Description  Discharged to appropriate level of care  4/17/2019 1251 by Edu Savage RN  Outcome: Met This Shift  4/17/2019 0442 by Lena Lynch RN  Outcome: Not Met This Shift     Problem: Gas Exchange - Impaired:  Goal: Levels of oxygenation will improve  Description  Levels of oxygenation will improve  4/17/2019 1251 by Edu Savage RN  Outcome: Met This Shift  4/17/2019 0442 by Lena Lynch RN  Outcome: Ongoing     Problem: Tissue Perfusion, Altered:  Goal: Circulatory function within specified parameters  Description  Circulatory function within specified parameters  4/17/2019 1251 by Edu Savage RN  Outcome: Met This Shift  4/17/2019 0442 by Lena Lynch RN  Outcome: Ongoing     Problem: Falls - Risk of:  Goal: Will remain free from falls  Description  Will remain free from falls  4/17/2019 1251 by Edu Savage RN  Outcome: Met This Shift  4/17/2019 0442 by Lena Lynch RN  Outcome: Met This Shift  Goal: Absence of physical injury  Description  Absence of physical injury  4/17/2019 1251 by Edu Savage RN  Outcome: Met This Shift  4/17/2019 0442 by Lena Lynch RN  Outcome: Met This Shift     Problem: Pain:  Goal: Pain level will decrease  Description  Pain level will decrease  4/17/2019 1251 by Edu Savage RN  Outcome: Met This Shift  4/17/2019 0442 by Lena Lynch RN  Outcome: Ongoing  Goal: Control of acute pain  Description  Control of acute pain  4/17/2019 1251 by Edu Savage RN  Outcome: Met This Shift  4/17/2019 0442 by Lena Lynch RN  Outcome: Ongoing  Goal: Control of chronic pain  Description  Control of chronic pain  4/17/2019 1251 by Edu Savage RN  Outcome: Met This Shift  4/17/2019 0442 by Lena Lynch RN  Outcome: Ongoing     Problem: Musculor/Skeletal Functional Status  Goal: Highest potential functional level  4/17/2019 1251 by Edu Savage RN  Outcome: Met This Shift  4/17/2019 0442 by Anupama Pardo RN  Outcome: Ongoing

## 2019-04-17 NOTE — PLAN OF CARE
Problem: Falls - Risk of:  Goal: Will remain free from falls  Description  Will remain free from falls  Outcome: Met This Shift  Goal: Absence of physical injury  Description  Absence of physical injury  Outcome: Met This Shift     Problem: Gas Exchange - Impaired:  Goal: Levels of oxygenation will improve  Description  Levels of oxygenation will improve  Outcome: Ongoing     Problem: Tissue Perfusion, Altered:  Goal: Circulatory function within specified parameters  Description  Circulatory function within specified parameters  Outcome: Ongoing     Problem: Pain:  Goal: Pain level will decrease  Description  Pain level will decrease  Outcome: Ongoing  Goal: Control of acute pain  Description  Control of acute pain  Outcome: Ongoing  Goal: Control of chronic pain  Description  Control of chronic pain  Outcome: Ongoing     Problem: Musculor/Skeletal Functional Status  Goal: Highest potential functional level  Outcome: Ongoing     Problem: Respiratory  Intervention: Respiratory assessment  4/16/2019 1957 by Marine Layne RCP  Note:   BRONCHOSPASM/BRONCHOCONSTRICTION     ? IMPROVE AERATION/BREATH SOUNDS  ? ADMINISTER BRONCHODILATOR THERAPY AS APPROPRIATE  ? ASSESS BREATH SOUNDS  ? IMPLEMENT AEROSOL/MDI PROTOCOL  ?    PATIENT EDUCATION AS NEEDED

## 2019-04-17 NOTE — PROGRESS NOTES
[facility]    Progress Note    4/17/2019    12:40 PM    Name:   Kemi Donohue  MRN:     [id number]     [place]:      [de-identified]   Room:   [address] [place] [address] Day:  4  Admit Date:  4/13/2019 12:21 PM    PCP:   Allison Andersen MD  Code Status:  Full Code    Subjective:     C/C:   Chief Complaint   Patient presents with    Fatigue    Emesis     Interval History Status: improved. Patient seen and examined at bedside, no acute events overnight. Continue to improve overall , low appetite   Hb is better   Patient denies any chest pain, shortness of breath, chills, fevers, nausea or vomiting. Seen by GI, no need for endoscope given known pathology   Plan family meeting for code status discussion this afternoon  Patient vitals, labs  were reviewed,from overnight shift and morning updates were noted and discussed with the nurse    Brief History:     Kemi Donohue is 72 y.o. female  Who was admitted to the hospital on 4/13/2019 for treatment of SIRS (systemic inflammatory response syndrome) ([facility] [address] Patient was brought to emergency room by friend for right upper quadrant pain, poor oral intake, loss of appetite, weight loss. 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  Patient had recent port placement and is waiting for chemotherapy.  Abdominal pain was severe, right upper quadrant, nonradiating, no worsening or relieving factors.  She had poor appetite, easy satiety, nausea persistent along with nonbloody nonbilious emesis.  Patient denies any fever, chills, difficulty breathing, wheezing, sputum production, dysuria, diarrhea.  She had significant weight loss in last few months.  Initial evaluation showed pulse 96, blood pressure 105/71, temperature 98. 1.  Lab work showed BUN 27, creatinine 1.2, lactic acid 3.3, albumin 2.8, bilirubin 0.9, , ALT 58, alk phos 668, WBC of 18.8, hemoglobin 8.8, platelet 183.  Chest x-ray showed no acute cardiac pulmonary process.  UA was negative for UTI . Patient was treated with empiric aztreonam and vanco for possible sepsis which was ruled out with negative blood cultures , CXR, UA and absence of symptoms for infection. Patient was treated with aggressive fluid resuscitation and lactic acidosis resolved     Review of Systems:     Constitutional:  negative for chills, fevers, sweats. Chronic fatigue and decreased appetite continues  Respiratory:  negative for cough, dyspnea on exertion, hemoptysis, shortness of breath, wheezing  Cardiovascular:  negative for chest pain, chest pressure/discomfort, lower extremity edema, palpitations  Gastrointestinal:  negative for abdominal pain, constipation, diarrhea, nausea, vomiting  Neurological:  negative for dizziness, headache    Medications: Allergies:     Allergies   Allergen Reactions    Aspirin Anaphylaxis    Penicillins     Iv Dye [Iodides] Hives       Current Meds:   Scheduled Meds:    sodium chloride  250 mL Intravenous Once    pantoprazole  40 mg Intravenous Daily    And    sodium chloride (PF)  10 mL Intravenous Daily    iron polysaccharides  150 mg Oral BID    senna  1 tablet Oral Nightly    docusate sodium  100 mg Oral Daily    mometasone-formoterol  2 puff Inhalation BID    fluticasone  1 spray Nasal Daily    cetirizine  10 mg Oral Daily    [Held by provider] amLODIPine  5 mg Oral Nightly    atorvastatin  10 mg Oral Daily    sodium chloride flush  10 mL Intravenous 2 times per day    megestrol  200 mg Oral Daily    oxyCODONE  10 mg Oral 2 times per day     Continuous Infusions:     PRN Meds: albuterol sulfate HFA, cromolyn, sodium chloride, nicotine polacrilex, sodium chloride flush, acetaminophen, oxyCODONE-acetaminophen, ondansetron    Data:     Past Medical History:   has a past medical history of Alcohol abuse, Duodenal adenocarcinoma ([facility] [address] Hyperlipidemia, Hypertension, Liver metastasis [facility]), Liver metastasis ([address] Pancreatitis, and Stone, kidney. Social History:   reports that she quit smoking about 4 years ago. Her smoking use included cigarettes. She has a 9.00 pack-year smoking history. She has never used smokeless tobacco. She reports that she drinks about 0.6 oz of alcohol per week. She reports that she does not use drugs. Family History: History reviewed. No pertinent family history. Vitals:  /62   Pulse 86   Temp 97.9 °F (36.6 °C) (Oral)   Resp 18   Ht 5' 3\" (1.6 m)   Wt 166 lb 11.2 oz (75.6 kg)   SpO2 100%   BMI 29.53 kg/m²   Temp (24hrs), Av.3 °F (36.8 °C), Min:97.9 °F (36.6 °C), Max:98.6 °F (37 °C)    No results for input(s): POCGLU in the last 72 hours. I/O (24Hr):     Intake/Output Summary (Last 24 hours) at 2019 1240  Last data filed at 2019 0030  Gross per 24 hour   Intake 1223.33 ml   Output 600 ml   Net 623.33 ml       Labs:    Hematology:  Recent Labs     04/15/19  1126 19  0713 19  1017   WBC 14.8* 15.3* 15.2*   RBC 2.99* 2.86* 3.14*   HGB 7.2* 6.9* 7.8*   HCT 26.4* 23.9* 26.1*   MCV 88.3 83.6 83.1   MCH 24.1* 24.1* 24.8*   MCHC 27.3* 28.9 29.9   RDW 18.5* 18.3* 17.9*    434 373   MPV 9.5 9.2 9.3     Chemistry:  Recent Labs     04/15/19  1126 19  0713    139   K 3.5* 4.4   * 109*   CO2 17* 20   GLUCOSE 88 89   BUN 9 8   CREATININE 0.71 0.69   ANIONGAP 13 10   LABGLOM >60 >60   GFRAA >60 >60   CALCIUM 7.9* 8.3*     Recent Labs     04/15/19  1126   PROT 5.7*   LABALBU 1.9*   *   ALT 46*   ALKPHOS 501*   BILITOT 0.69         Lab Results   Component Value Date/Time    SPECIAL NOT REPORTED 2019 07:16 PM     Lab Results   Component Value Date/Time    CULTURE NO SIGNIFICANT GROWTH 2019 07:16 PM       Lab Results   Component Value Date    FIO2 NOT REPORTED 2019       Radiology:        Physical Examination:        General appearance:  alert, cooperative and no distress  Mental Status: oriented to person, place and time and normal affect  Lungs:  clear to auscultation bilaterally, normal effort, port noted  Heart:  regular rate and rhythm, no murmur  Abdomen:  soft, nontender, nondistended, normal bowel sounds, no masses, hepatomegaly, splenomegaly  Extremities:  no edema, redness, tenderness in the calves  Skin:  no gross lesions, rashes, induration    Assessment:        Primary Problem  [facility]systemic inflammatory response syndrome) ([facility]    Active Hospital Problems    Diagnosis Date Noted    Encounter for palliative care [Z51.5]     Nausea and vomiting [R11.2]     Septicemia ([facility] [address] [A41.9]     Lactic acid acidosis [E87.2] 04/13/2019    SIRS (systemic inflammatory response syndrome) (HCC) [R65.10] 04/13/2019    Dehydration [E86.0] 04/13/2019    Moderate malnutrition (HCC) [E44.0] 04/13/2019    Loss of appetite [R63.0] 04/13/2019    Right upper quadrant pain [R10.11] 04/13/2019    Liver metastasis (HCC) [C78.7]     Duodenal adenocarcinoma (HCC) [C17.0]     Chronic bronchitis (HCC) [J42] 05/25/2017    Stage 3 chronic kidney disease ([facility] [N18.3] 06/09/2015    Essential hypertension [I10] 01/27/2012    Mitral valve prolapse [I34.1] 01/27/2012       Plan:        Acute on chronic anemia : S/P Tx 1 U PRBCs, seen GI , no need for EGD given known pathology , continue PPI  SIRS: roled out    Lactic acidosis : resolved with hydration, DC fluids     Dehydration: resolved, advance diet    MARIA VICTORIA: resolved    Hypokalemia: replace    Leukocytosis: mostly reactive, no evidence of infection    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

## 2019-04-18 LAB
ANION GAP SERPL CALCULATED.3IONS-SCNC: 16 MMOL/L (ref 9–17)
BUN BLDV-MCNC: 10 MG/DL (ref 8–23)
BUN/CREAT BLD: ABNORMAL (ref 9–20)
CALCIUM SERPL-MCNC: 8.5 MG/DL (ref 8.6–10.4)
CHLORIDE BLD-SCNC: 106 MMOL/L (ref 98–107)
CO2: 17 MMOL/L (ref 20–31)
CREAT SERPL-MCNC: 0.67 MG/DL (ref 0.5–0.9)
GFR AFRICAN AMERICAN: >60 ML/MIN
GFR NON-AFRICAN AMERICAN: >60 ML/MIN
GFR SERPL CREATININE-BSD FRML MDRD: ABNORMAL ML/MIN/{1.73_M2}
GFR SERPL CREATININE-BSD FRML MDRD: ABNORMAL ML/MIN/{1.73_M2}
GLUCOSE BLD-MCNC: 90 MG/DL (ref 70–99)
POTASSIUM SERPL-SCNC: 4 MMOL/L (ref 3.7–5.3)
SODIUM BLD-SCNC: 139 MMOL/L (ref 135–144)

## 2019-04-18 PROCEDURE — 94640 AIRWAY INHALATION TREATMENT: CPT

## 2019-04-18 PROCEDURE — 97116 GAIT TRAINING THERAPY: CPT

## 2019-04-18 PROCEDURE — 80048 BASIC METABOLIC PNL TOTAL CA: CPT

## 2019-04-18 PROCEDURE — 6370000000 HC RX 637 (ALT 250 FOR IP): Performed by: FAMILY MEDICINE

## 2019-04-18 PROCEDURE — 36415 COLL VENOUS BLD VENIPUNCTURE: CPT

## 2019-04-18 PROCEDURE — 99232 SBSQ HOSP IP/OBS MODERATE 35: CPT | Performed by: INTERNAL MEDICINE

## 2019-04-18 PROCEDURE — 2580000003 HC RX 258: Performed by: FAMILY MEDICINE

## 2019-04-18 PROCEDURE — C9113 INJ PANTOPRAZOLE SODIUM, VIA: HCPCS | Performed by: FAMILY MEDICINE

## 2019-04-18 PROCEDURE — 99232 SBSQ HOSP IP/OBS MODERATE 35: CPT | Performed by: FAMILY MEDICINE

## 2019-04-18 PROCEDURE — 93005 ELECTROCARDIOGRAM TRACING: CPT

## 2019-04-18 PROCEDURE — 1200000000 HC SEMI PRIVATE

## 2019-04-18 PROCEDURE — 6360000002 HC RX W HCPCS: Performed by: FAMILY MEDICINE

## 2019-04-18 PROCEDURE — 94762 N-INVAS EAR/PLS OXIMTRY CONT: CPT

## 2019-04-18 RX ORDER — PANTOPRAZOLE SODIUM 40 MG/1
40 TABLET, DELAYED RELEASE ORAL
Status: DISCONTINUED | OUTPATIENT
Start: 2019-04-18 | End: 2019-04-20 | Stop reason: HOSPADM

## 2019-04-18 RX ADMIN — PANTOPRAZOLE SODIUM 40 MG: 40 INJECTION, POWDER, LYOPHILIZED, FOR SOLUTION INTRAVENOUS at 08:31

## 2019-04-18 RX ADMIN — DOCUSATE SODIUM 100 MG: 100 CAPSULE, LIQUID FILLED ORAL at 08:31

## 2019-04-18 RX ADMIN — CETIRIZINE HYDROCHLORIDE 10 MG: 10 TABLET ORAL at 08:31

## 2019-04-18 RX ADMIN — PANTOPRAZOLE SODIUM 40 MG: 40 TABLET, DELAYED RELEASE ORAL at 16:03

## 2019-04-18 RX ADMIN — OXYCODONE HYDROCHLORIDE 10 MG: 10 TABLET, FILM COATED, EXTENDED RELEASE ORAL at 08:31

## 2019-04-18 RX ADMIN — SENNOSIDES 8.6 MG: 8.6 TABLET, FILM COATED ORAL at 22:06

## 2019-04-18 RX ADMIN — Medication 10 ML: at 08:42

## 2019-04-18 RX ADMIN — MEGESTROL ACETATE 200 MG: 40 SUSPENSION ORAL at 08:31

## 2019-04-18 RX ADMIN — OXYCODONE HYDROCHLORIDE 10 MG: 10 TABLET, FILM COATED, EXTENDED RELEASE ORAL at 22:06

## 2019-04-18 RX ADMIN — Medication 150 MG: at 22:06

## 2019-04-18 RX ADMIN — MOMETASONE FUROATE AND FORMOTEROL FUMARATE DIHYDRATE 2 PUFF: 200; 5 AEROSOL RESPIRATORY (INHALATION) at 08:33

## 2019-04-18 RX ADMIN — Medication 150 MG: at 08:31

## 2019-04-18 RX ADMIN — MOMETASONE FUROATE AND FORMOTEROL FUMARATE DIHYDRATE 2 PUFF: 200; 5 AEROSOL RESPIRATORY (INHALATION) at 19:17

## 2019-04-18 RX ADMIN — Medication 10 ML: at 22:08

## 2019-04-18 RX ADMIN — ATORVASTATIN CALCIUM 10 MG: 10 TABLET, FILM COATED ORAL at 08:31

## 2019-04-18 ASSESSMENT — PAIN SCALES - GENERAL
PAINLEVEL_OUTOF10: 4
PAINLEVEL_OUTOF10: 2
PAINLEVEL_OUTOF10: 2

## 2019-04-18 NOTE — PLAN OF CARE
Problem: Gas Exchange - Impaired:  Goal: Levels of oxygenation will improve  Description  Levels of oxygenation will improve  Outcome: Ongoing     Problem: Tissue Perfusion, Altered:  Goal: Circulatory function within specified parameters  Description  Circulatory function within specified parameters  Outcome: Ongoing     Problem: Falls - Risk of:  Goal: Will remain free from falls  Description  Will remain free from falls  Outcome: Ongoing  Goal: Absence of physical injury  Description  Absence of physical injury  Outcome: Ongoing     Problem: Pain:  Goal: Pain level will decrease  Description  Pain level will decrease  Outcome: Ongoing  Goal: Control of acute pain  Description  Control of acute pain  Outcome: Ongoing     Problem: Musculor/Skeletal Functional Status  Goal: Highest potential functional level  Outcome: Ongoing

## 2019-04-18 NOTE — PROGRESS NOTES
Physical Therapy  Facility/Department: [address] ORTHO/MED SURG  Daily Treatment Note  NAME: Jayda Nails  : 1945  MRN: [id number]    Date of Service: 2019    Discharge Recommendations:  Further therapy recommended at discharge. PT Equipment Recommendations  Equipment Needed: Yes  Mobility Devices: Gibson Mate: Rolling    Patient Diagnosis(es): The primary encounter diagnosis was Septicemia ([facility] [address] Diagnoses of Nausea and vomiting, intractability of vomiting not specified, unspecified vomiting type, Dehydration, Right upper quadrant abdominal pain, Liver metastasis ([address] and Duodenal adenocarcinoma ([address] were also pertinent to this visit. 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 has a past surgical history that includes Hysterectomy; syl and bso (cervix removed); Pancreas surgery; Upper gastrointestinal endoscopy (N/A, 2019); Upper gastrointestinal endoscopy (2019); and Upper gastrointestinal endoscopy (N/A, 2019). Restrictions  Restrictions/Precautions  Restrictions/Precautions: Fall Risk  Required Braces or Orthoses?: No  Position Activity Restriction  Other position/activity restrictions: up with assist, up as tolerated   Subjective   General  Chart Reviewed: Yes  Response To Previous Treatment: Patient with no complaints from previous session. Family / Caregiver Present: No  Subjective  Subjective: Alert in bed; agreeable to therapy. General Comment  Comments: Pt left in chair with alarm activated; call light in reach.    Pain Screening  Patient Currently in Pain: Denies  Vital Signs  Patient Currently in Pain: Denies       Orientation  Orientation  Overall Orientation Status: Within Normal Limits  Cognition      Objective   Bed mobility  Supine to Sit: Supervision  Scooting: Supervision  Transfers  Sit to Stand: Stand by assistance  Stand to sit: Contact guard assistance  Bed to Chair: Minimal assistance  Ambulation  Ambulation?: Yes  Ambulation 1  Surface: level tile  Device: Rolling Walker  Assistance: Contact guard assistance  Quality of Gait: VERY decreased pace, heavy reliance on RW for support, decreased step length, flexed posture  Distance: 35ft  Comments: increased time to ambulate due to fatigue  Stairs/Curb  Stairs?: No     Balance  Posture: Good  Sitting - Static: Good  Sitting - Dynamic: Good;-  Standing - Static: Fair  Standing - Dynamic: Fair;-       Exercises  Seated LE exercise program: Long Arc Quads, hip abduction/adduction, heel/toe raises, and marches. Reps:10x each, tolerated at a slow pace    Assessment   Body structures, Functions, Activity limitations: Decreased functional mobility ; Decreased endurance;Decreased strength;Decreased balance;Decreased safe awareness  Assessment: Pt exhibits decreased endurance and poor activity tolerance. Prognosis: Fair  Patient Education: HEP  REQUIRES PT FOLLOW UP: Yes  Activity Tolerance  Activity Tolerance: Patient limited by fatigue;Patient limited by endurance     Goals  Short term goals  Time Frame for Short term goals: 10 visits  Short term goal 1: Transfers with SBA  Short term goal 2: amb 125 ft with a RW x SBA  Short term goal 3: ascend/descend 4 steps with SBA  Short term goal 4: 20-30 min exercise programx SBA  Patient Goals   Patient goals : Return home    Plan    Plan  Times per week: 5-6x wk  Current Treatment Recommendations: Strengthening, Transfer Training, Endurance Training, Gait Training, Functional Mobility Training, Stair training, Safety Education & Training  Safety Devices  Type of devices:  All fall risk precautions in place, Gait belt, Patient at risk for falls, Call light within reach, Left in chair, Chair alarm in place, Nurse notified  Restraints  Initially in place: No     Therapy Time   Individual Concurrent Group Co-treatment   Time In 1113

## 2019-04-18 NOTE — PROGRESS NOTES
Dr. Xiao Kim at bedside to discuss code status.  Patient changed to [facility]

## 2019-04-18 NOTE — PROGRESS NOTES
Mahsa Bowling 21    Progress Note    4/18/2019    7:34 AM    Name:   Leah Maldonado  MRN:     [id number]     [place]:      [de-identified]   Room:   [address] [place] [address] Day:  5  Admit Date:  4/13/2019 12:21 PM    PCP:   Joann Gardner MD  Code Status:  Full Code    Subjective:     C/C:   Chief Complaint   Patient presents with    Fatigue    Emesis     Interval History Status: improved. Patient seen and examined at bedside, no acute events overnight. Continue to improve overall ,  still low appetite   Hb is better   Patient denies any chest pain, shortness of breath, chills, fevers, nausea or vomiting. Discussed with her,  change code status to [facility]   Patient vitals, labs  were reviewed,from overnight shift and morning updates were noted and discussed with the nurse    Brief History:     Leah Maldonado is 72 y.o. female  Who was admitted to the hospital on 4/13/2019 for treatment of SIRS (systemic inflammatory response syndrome) ([facility] [address] Patient was brought to emergency room by friend for right upper quadrant pain, poor oral intake, loss of appetite, weight loss. 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  Patient had recent port placement and is waiting for chemotherapy.  Abdominal pain was severe, right upper quadrant, nonradiating, no worsening or relieving factors.  She had poor appetite, easy satiety, nausea persistent along with nonbloody nonbilious emesis.  Patient denies any fever, chills, difficulty breathing, wheezing, sputum production, dysuria, diarrhea.  She had significant weight loss in last few months.  Initial evaluation showed pulse 96, blood pressure 105/71, temperature 98. 1.  Lab work showed BUN 27, creatinine 1.2, lactic acid 3.3, albumin 2.8, bilirubin 0.9, , ALT 58, alk phos 668, WBC of 18.8, hemoglobin 8.8, platelet 637.  Chest x-ray showed no acute cardiac pulmonary process.  UA was negative for UTI . Patient was treated with empiric aztreonam and vanco for possible sepsis which was ruled out with negative blood cultures , CXR, UA and absence of symptoms for infection. Patient was treated with aggressive fluid resuscitation and lactic acidosis resolved     Review of Systems:     Constitutional:  negative for chills, fevers, sweats. Chronic fatigue and decreased appetite continues  Respiratory:  negative for cough, dyspnea on exertion, hemoptysis, shortness of breath, wheezing  Cardiovascular:  negative for chest pain, chest pressure/discomfort, lower extremity edema, palpitations  Gastrointestinal:  negative for abdominal pain, constipation, diarrhea, nausea, vomiting  Neurological:  negative for dizziness, headache    Medications: Allergies:     Allergies   Allergen Reactions    Aspirin Anaphylaxis    Penicillins     Iv Dye [Iodides] Hives       Current Meds:   Scheduled Meds:    sodium chloride  250 mL Intravenous Once    pantoprazole  40 mg Intravenous Daily    And    sodium chloride (PF)  10 mL Intravenous Daily    iron polysaccharides  150 mg Oral BID    senna  1 tablet Oral Nightly    docusate sodium  100 mg Oral Daily    mometasone-formoterol  2 puff Inhalation BID    fluticasone  1 spray Nasal Daily    cetirizine  10 mg Oral Daily    [Held by provider] amLODIPine  5 mg Oral Nightly    atorvastatin  10 mg Oral Daily    sodium chloride flush  10 mL Intravenous 2 times per day    megestrol  200 mg Oral Daily    oxyCODONE  10 mg Oral 2 times per day     Continuous Infusions:     PRN Meds: albuterol sulfate HFA, cromolyn, sodium chloride, nicotine polacrilex, sodium chloride flush, acetaminophen, oxyCODONE-acetaminophen, ondansetron    Data:     Past Medical History:   has a past medical history of Alcohol abuse, Duodenal adenocarcinoma ([facility] [address] Hyperlipidemia, Hypertension, Liver metastasis ([facility] [address] Liver metastasis ([facility] [address] Pancreatitis, and Stone, kidney. Social History:   reports that she quit smoking about 4 years ago. Her smoking use included cigarettes. She has a 9.00 pack-year smoking history. She has never used smokeless tobacco. She reports that she drinks about 0.6 oz of alcohol per week. She reports that she does not use drugs. Family History: History reviewed. No pertinent family history. Vitals:  /85   Pulse 94   Temp 98.2 °F (36.8 °C) (Oral)   Resp 16   Ht 5' 3\" (1.6 m)   Wt 166 lb 11.2 oz (75.6 kg)   SpO2 98%   BMI 29.53 kg/m²   Temp (24hrs), Av.1 °F (36.7 °C), Min:97.9 °F (36.6 °C), Max:98.3 °F (36.8 °C)    No results for input(s): POCGLU in the last 72 hours. I/O (24Hr):     Intake/Output Summary (Last 24 hours) at 2019 0734  Last data filed at 2019 1747  Gross per 24 hour   Intake 400 ml   Output 400 ml   Net 0 ml       Labs:    Hematology:  Recent Labs     04/15/19  1126 19  0713 19  1017   WBC 14.8* 15.3* 15.2*   RBC 2.99* 2.86* 3.14*   HGB 7.2* 6.9* 7.8*   HCT 26.4* 23.9* 26.1*   MCV 88.3 83.6 83.1   MCH 24.1* 24.1* 24.8*   MCHC 27.3* 28.9 29.9   RDW 18.5* 18.3* 17.9*    434 373   MPV 9.5 9.2 9.3     Chemistry:  Recent Labs     04/15/19  1126 19  0713    139   K 3.5* 4.4   * 109*   CO2 17* 20   GLUCOSE 88 89   BUN 9 8   CREATININE 0.71 0.69   ANIONGAP 13 10   LABGLOM >60 >60   GFRAA >60 >60   CALCIUM 7.9* 8.3*     Recent Labs     04/15/19  1126   PROT 5.7*   LABALBU 1.9*   *   ALT 46*   ALKPHOS 501*   BILITOT 0.69         Lab Results   Component Value Date/Time    SPECIAL NOT REPORTED 2019 07:16 PM     Lab Results   Component Value Date/Time    CULTURE NO SIGNIFICANT GROWTH 2019 07:16 PM       Lab Results   Component Value Date    FIO2 NOT REPORTED 2019       Radiology:        Physical Examination:        General appearance:  alert, cooperative and no distress  Mental Status:  oriented to person, place and time and normal affect  Lungs: clear to auscultation bilaterally, normal effort, port noted  Heart:  regular rate and rhythm, no murmur  Abdomen:  soft, nontender, nondistended, normal bowel sounds, no masses, hepatomegaly, splenomegaly  Extremities:  no edema, redness, tenderness in the calves  Skin:  no gross lesions, rashes, induration    Assessment:        Primary Problem  [facility]systemic inflammatory response syndrome) ([facility]    Active Hospital Problems    Diagnosis Date Noted    Anemia in other chronic diseases classified elsewhere [D63.8]     Encounter for palliative care [Z51.5]     Nausea and vomiting [R11.2]     Septicemia ([facility] [A41.9]     Lactic acid acidosis [E87.2] 04/13/2019    SIRS (systemic inflammatory response syndrome) (HCC) [R65.10] 04/13/2019    Dehydration [E86.0] 04/13/2019    Moderate malnutrition (HCC) [E44.0] 04/13/2019    Loss of appetite [R63.0] 04/13/2019    Right upper quadrant pain [R10.11] 04/13/2019    Liver metastasis (HCC) [C78.7]     Duodenal adenocarcinoma (HCC) [C17.0]     Chronic bronchitis (HCC) [J42] 05/25/2017    Stage 3 chronic kidney disease ([facility] [N18.3] 06/09/2015    Essential hypertension [I10] 01/27/2012    Mitral valve prolapse [I34.1] 01/27/2012       Plan:        Acute on chronic anemia : S/P Tx 1 U PRBCs, seen GI , no need for EGD given known pathology , continue PPI    SIRS: roled out    Lactic acidosis : resolved with hydration, DC fluids     Dehydration: resolved, advance diet    MARIA VICTORIA: resolved    Hypokalemia: replaced    Leukocytosis: mostly reactive, no evidence of infection    Metastatic 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

## 2019-04-18 NOTE — PROGRESS NOTES
Today's Date: 4/17/2019  Patient Name: Shelton Storey  Date of admission: 4/13/2019 12:21 PM  Patient's age: 72 y.o., 1945  Admission Dx: SIRS (systemic inflammatory response syndrome) ([facility] [address] [R65.10]      Requesting Physician: Deanna Beard MD    CHIEF COMPLAINT:  Nausea and vomiting. INTERIM HISTORY  Patient is seen and evaluated. She overall feels well. She continues to have decreased appetite and some abdominal discomfort. I had a long discussion with the patient about her diagnosis and goals of care. She is asking me to call her POA which is her niece. I ended up calling her POA Etelvina Hydedel, and had a long discussion with her about diagnosis and goals of care. The patient has stage IV small intestinal tumor and she is considering therapy. We discussed chemotherapy versus palliative care. They decided that they are going to discuss it and get back to us with a decision. HISTORY OF PRESENT ILLNESS:      The patient is a 72 y.o.  female who is admitted to the hospital for abdominal pain, nausea and vomiting and weight loss. She was recently diagnosed with small intestinal tumor with biopsy proven liver metastases. She underwent Mediport insertion and she was planned for chemotherapy. The patient condition has been deteriorating. She lost significant weight. She is cachectic and very weak. 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 Past Surgical History:   has a past surgical history that includes Hysterectomy; syl and bso (cervix removed); Pancreas surgery; Upper gastrointestinal endoscopy (N/A, 2/20/2019); Upper gastrointestinal endoscopy (2/20/2019); and Upper gastrointestinal endoscopy (N/A, 2/22/2019). Family History: family history is not on file. Social History:   reports that she quit smoking about 4 years ago. Her smoking use included cigarettes. She has a 9.00 pack-year smoking history. She has never used smokeless tobacco. She reports that she drinks about 0.6 oz of alcohol per week. She reports that she does not use drugs. Medications:    Reviewed in EPIC     Allergies:  Aspirin; Penicillins; and Iv dye [iodides]    REVIEW OF SYSTEMS:      Review of Systems  General: no fever or night sweats, Weight loss, fatigue   ENT: No double or blurred vision, no tinnitus or hearing problem, no dysphagia or sore throat   Respiratory: No chest pain, no shortness of breath, no cough or hemoptysis. Cardiovascular: Denies chest pain, PND or orthopnea. No L E swelling or palpitations. Gastrointestinal:    abd pain, nausea and vomiting. Genitourinary: Denies dysuria, hematuria, frequency, urgency or incontinence. Neurological: Denies headaches, decreased LOC, no sensory or motor focal deficits. Musculoskeletal:  No arthralgia no back pain or joint swelling. Skin: There are no rashes or bleeding. Psychiatric:  No anxiety, no depression. Endocrine: no diabetes or thyroid disease. Hematologic: no bleeding , no adenopathy. PHYSICAL EXAM:      BP 99/69   Pulse 93   Temp 98 °F (36.7 °C) (Oral)   Resp 16   Ht 5' 3\" (1.6 m)   Wt 166 lb 11.2 oz (75.6 kg)   SpO2 99%   BMI 29.53 kg/m²    Temp (24hrs), Av.1 °F (36.7 °C), Min:97.9 °F (36.6 °C), Max:98.3 °F (36.8 °C)      Physical Exam  Gen.  Exam, showed a well-appearing patient without evidence of distress or pain, cachectic, weak   HEENT, normocephalic and atraumatic, PERRLA extraocular muscles are intact  Neck showed no JVD no carotid bruit, no cervical adenopathy  Chest is clear to auscultation bilaterally  Heart is regular without any murmur  Abdomen mild diffuse tenderness   Lower extremities showed no edema clubbing or cyanosis  Neurological examination was nonfocal, with intact cranial nerves  Skin  No rashes or bruising appreciated          DATA:      Labs:       CBC:   Recent Labs     04/16/19  0713 04/17/19  1017   WBC 15.3* 15.2*   HGB 6.9* 7.8*   HCT 23.9* 26.1*    373     BMP:   Recent Labs     04/15/19  1126 04/16/19  0713    139   K 3.5* 4.4   CO2 17* 20   BUN 9 8   CREATININE 0.71 0.69   LABGLOM >60 >60   GLUCOSE 88 89     PT/INR:   No results for input(s): PROTIME, INR in the last 72 hours. APTT:No results for input(s): APTT in the last 72 hours. LIVER PROFILE:  Recent Labs     04/15/19  1126   *   ALT 46*   LABALBU 1.9*               IMPRESSION:    Primary Problem  SIRS (systemic inflammatory response syndrome) ([facility] [address]    Active Hospital Problems    Diagnosis Date Noted    Anemia in other chronic diseases classified elsewhere [D63.8]     Encounter for palliative care [Z51.5]     Nausea and vomiting [R11.2]     Septicemia ([facility] [address] [A41.9]     Lactic acid acidosis [E87.2] 04/13/2019    SIRS (systemic inflammatory response syndrome) ([facility] [address] [R65.10] 04/13/2019    Dehydration [E86.0] 04/13/2019    Moderate malnutrition ([facility] [address] [E44.0] 04/13/2019    Loss of appetite [R63.0] 04/13/2019    Right upper quadrant pain [R10.11] 04/13/2019    Liver metastasis (HCC) [C78.7]     Duodenal adenocarcinoma (HCC) [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 she is technically a candidate for systemic therapy. I think it is more important to determine goals of care. The patient is very weak and fatigued and her performance status is ECOG 2.  2. As discussed above. I discussed with the patient and her POA diagnosis and goals of care.   There will confir with the results of the family and get back to us with a decision    About 40 minutes were spent  Discussed with patient and Nurse.     Miriam Dunn MD   [phone]

## 2019-04-18 NOTE — PLAN OF CARE
Problem: Discharge Planning:  Goal: Discharged to appropriate level of care  Description  Discharged to appropriate level of care  Outcome: Ongoing     Problem: Gas Exchange - Impaired:  Goal: Levels of oxygenation will improve  Description  Levels of oxygenation will improve  4/18/2019 1616 by Alexi Nugent  Outcome: Ongoing  4/18/2019 0626 by Royal Romo RN  Outcome: Ongoing     Problem: Tissue Perfusion, Altered:  Goal: Circulatory function within specified parameters  Description  Circulatory function within specified parameters  4/18/2019 1616 by Alexi Nugent  Outcome: Ongoing  4/18/2019 0626 by Royal Romo RN  Outcome: Ongoing     Problem: Falls - Risk of:  Goal: Will remain free from falls  Description  Will remain free from falls  4/18/2019 1616 by Alexi Nugent  Outcome: Ongoing  4/18/2019 0626 by Royal Romo RN  Outcome: Ongoing  Goal: Absence of physical injury  Description  Absence of physical injury  4/18/2019 1616 by Alexi Nugent  Outcome: Ongoing  4/18/2019 0626 by Royal Romo RN  Outcome: Ongoing     Problem: Pain:  Goal: Pain level will decrease  Description  Pain level will decrease  4/18/2019 1616 by Alexi Nugent  Outcome: Ongoing  4/18/2019 0626 by Royal Romo RN  Outcome: Ongoing  Goal: Control of acute pain  Description  Control of acute pain  4/18/2019 1616 by Alexi Nugent  Outcome: Ongoing  4/18/2019 0626 by Royal Romo RN  Outcome: Ongoing  Goal: Control of chronic pain  Description  Control of chronic pain  Outcome: Ongoing     Problem: Musculor/Skeletal Functional Status  Goal: Highest potential functional level  4/18/2019 1616 by Alexi Nugent  Outcome: Ongoing  4/18/2019 0626 by Royal Romo RN  Outcome: Ongoing

## 2019-04-18 NOTE — PROGRESS NOTES
Today's Date: 4/18/2019  Patient Name: Maricarmen Jose  Date of admission: 4/13/2019 12:21 PM  Patient's age: 72 y.o., 1945  Admission Dx: SIRS (systemic inflammatory response syndrome) ([facility] [address] [R65.10]      Requesting Physician: Hamzah Workman MD    CHIEF COMPLAINT:  Nausea and vomiting. INTERIM HISTORY  Patient is seen and evaluated. She overall feels well. The patient made up her mind not to go through any treatment   Significant other Vimal [place]) is not happy with the decision but he was told that it is her decision and I will respect it     HISTORY OF PRESENT ILLNESS:      The patient is a 72 y.o.  female who is admitted to the hospital for abdominal pain, nausea and vomiting and weight loss. She was recently diagnosed with small intestinal tumor with biopsy proven liver metastases. She underwent Mediport insertion and she was planned for chemotherapy. The patient condition has been deteriorating. She lost significant weight. She is cachectic and very weak. 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 Past Surgical History:   has a past surgical history that includes Hysterectomy; syl and bso (cervix removed); Pancreas surgery; Upper gastrointestinal endoscopy (N/A, 2/20/2019); Upper gastrointestinal endoscopy (2/20/2019); and Upper gastrointestinal endoscopy (N/A, 2/22/2019). Family History: family history is not on file. Social History:   reports that she quit smoking about 4 years ago. Her smoking use included cigarettes. She has a 9.00 pack-year smoking history. She has never used smokeless tobacco. She reports that she drinks about 0.6 oz of alcohol per week. She reports that she does not use drugs.    Medications: 17*   BUN 8 10   CREATININE 0.69 0.67   LABGLOM >60 >60   GLUCOSE 89 90     PT/INR:   No results for input(s): PROTIME, INR in the last 72 hours. APTT:No results for input(s): APTT in the last 72 hours. LIVER PROFILE:  No results for input(s): AST, ALT, LABALBU in the last 72 hours. IMPRESSION:    Primary Problem  SIRS (systemic inflammatory response syndrome) ([facility] [address]    Active Hospital Problems    Diagnosis Date Noted    Anemia in other chronic diseases classified elsewhere [D63.8]     Encounter for palliative care [Z51.5]     Nausea and vomiting [R11.2]     Septicemia ([facility] [address] [A41.9]     Lactic acid acidosis [E87.2] 04/13/2019    SIRS (systemic inflammatory response syndrome) ([facility] [address] [R65.10] 04/13/2019    Dehydration [E86.0] 04/13/2019    Moderate malnutrition ([facility] [address] [E44.0] 04/13/2019    Loss of appetite [R63.0] 04/13/2019    Right upper quadrant pain [R10.11] 04/13/2019    Liver metastasis (HCC) [C78.7]     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 [N18.3] 06/09/2015    Essential hypertension [I10] 01/27/2012    Mitral valve prolapse [I34.1] 01/27/2012       RECOMMENDATIONS:  1. 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 she is technically a candidate for systemic therapy. I think it is more important to determine goals of care. The patient is very weak and fatigued and her performance status is ECOG 2.  2. Decision not to go through any treatment, arrange for palliative care at home   3. We will be available as needed   4. Agree with DNR-CCA     About 40 minutes were spent  Discussed with patient and Nurse.     Zoie Plata MD   [phone]

## 2019-04-18 NOTE — PROGRESS NOTES
Other: Mental Status: ?x normal mental status exam      ? drowsy      ? Confused      ? Other:     Cardiovascular: ?x  Regular rate/rhythm      ? Arrhythmia      ? Other:     Chest: ?x Effort normal      ?x lungs clear      ? respiratory distress      ? Tachypnea      ? Other:    Abdomen: ?x Soft/non-tender      ?x  Normal appearance      ? Distended      ? Ascites      ? Other:    Neurological: ?x Normal Speech      ?x Normal Sensation      ? Deficits present:      Extremity:  ?x normal skin color/temp      ? clubbing/cyanosis      ? No edema      ? Other:     Palliative Performance Scale:  __x_60%  Ambulation reduced; Significant disease; Can't do hobbies/housework; intake normal or reduced; occasional assist; LOC full/confusion  ___50%  Mainly sit/lie; Extensive disease; Can't do any work; Considerable assist; intake normal or reduced; LOC full/confusion  ___40%  Mainly in bed; Extensive disease; Mainly assist; intake normal or reduced; LOC full/confusion   ___30%  Bed Bound; Extensive disease; Total care; intake reduced; LOCfull/confusion  ___20%  Bed Bound; Extensive disease; Total care; intake minimal; Drowsy/coma  ___10%  Bed Bound; Extensive disease;  Total care; Mouth care only; Drowsy/coma  ___0       Death      Plan      Palliative Interaction:  - The patient was seen today and was sitting comfortably in the recliner    - No family member was present in the patient's room    - Patient's current medical conditions were discussed with her    - I explained to the patient the significance of POA paperwork and discussed with her that she had made her niece Chan Johnson as her POA for health and the patient seems to understand about it    - I discussed with the patient that she has changed her code status to DNR CCA and and explained her the different types of codes and patient stated that she did not want to have chest compressions/CPR done if her heart stopped and also did not want to be intubated if her breathing stopped    - I explained to the patient that her code status has been changed to DNR CCA with no intubation as discussed with her primary care attending    - I offered comfort and emotional support to the patient    Education/support to staff  Education/support to family  Education/support to patient  Discharge planning/helping to coordinate care  Communications with primary service  Caregiver support/education  Code status clarified: Full Code  Code status clarified: [facility]  Code status clarified: DNRCCA  Other major issues     Principle Problem/Diagnosis:  SIRS (systemic inflammatory response syndrome) ([facility] [address]    Additional Assessments:  Principal Problem:    SIRS (systemic inflammatory response syndrome) ([facility] [address]  Active Problems:    Essential hypertension    Mitral valve prolapse    Stage 3 chronic kidney disease (HCC)    Chronic bronchitis (HCC)    Liver metastasis (HCC)    Duodenal adenocarcinoma (HCC)    Lactic acid acidosis    Dehydration    Moderate malnutrition (HCC)    Loss of appetite    Right upper quadrant pain    Encounter for palliative care    Nausea and vomiting    Septicemia ([facility] [address]    Anemia in other chronic diseases classified elsewhere    1- Symptom management/ pain control     Pain Assessment:  Pain is controlled with current analgesics. Medication(s) being used: acetaminophen, narcotic analgesics including oxycodone, Percocet. Anxiety:  fatigue                          Dyspnea:  none                          Fatigue:  Tiredness and weakness    We feel the patient symptoms are being controlled. her current regimen is reviewed by myself and discussed with the staff.      2- Goals of care evaluation   The patient goals of care are improve or maintain function/quality of life, accomplish a particular personal goal, spiritual needs, remain at home, strengthening relationships, preserve independence/autonomy/control and support for family/caregiver   Goals of care discussed with: ?x Patient independently    ? Patient and Family    ? Family or Healthcare DPOA independently    ? Unable to discuss with patient, family/DPOA not present    3- Code Status  DNR-CCA with no intubation     4- Other recommendations  - We will continue to provide comfort and support to the patient and the family    Please call with any palliative questions or concerns. Palliative Care Team is available via perfect serve or via phone. Palliative Care will continue to follow Ms. Everett's care as needed. The note has been dictated by dragon, typing errors may be a possibility     Thank you for allowing Palliative Care to participate in the care of Ms. Lev oPol .        Electronically signed by   Laura Givens MD  Palliative Care Team  on 4/18/2019 at 12:14 PM    Palliative care office: [phone]

## 2019-04-19 LAB
CULTURE: NORMAL
CULTURE: NORMAL
HCT VFR BLD CALC: 28.2 % (ref 36.3–47.1)
HEMOGLOBIN: 8.2 G/DL (ref 11.9–15.1)
Lab: NORMAL
Lab: NORMAL
MCH RBC QN AUTO: 24.7 PG (ref 25.2–33.5)
MCHC RBC AUTO-ENTMCNC: 29.1 G/DL (ref 28.4–34.8)
MCV RBC AUTO: 84.9 FL (ref 82.6–102.9)
NRBC AUTOMATED: 0 PER 100 WBC
PDW BLD-RTO: 19 % (ref 11.8–14.4)
PLATELET # BLD: 384 K/UL (ref 138–453)
PMV BLD AUTO: 9.7 FL (ref 8.1–13.5)
RBC # BLD: 3.32 M/UL (ref 3.95–5.11)
SPECIMEN DESCRIPTION: NORMAL
SPECIMEN DESCRIPTION: NORMAL
WBC # BLD: 15 K/UL (ref 3.5–11.3)

## 2019-04-19 PROCEDURE — 6370000000 HC RX 637 (ALT 250 FOR IP): Performed by: STUDENT IN AN ORGANIZED HEALTH CARE EDUCATION/TRAINING PROGRAM

## 2019-04-19 PROCEDURE — 36415 COLL VENOUS BLD VENIPUNCTURE: CPT

## 2019-04-19 PROCEDURE — 85027 COMPLETE CBC AUTOMATED: CPT

## 2019-04-19 PROCEDURE — 94640 AIRWAY INHALATION TREATMENT: CPT

## 2019-04-19 PROCEDURE — 2580000003 HC RX 258: Performed by: FAMILY MEDICINE

## 2019-04-19 PROCEDURE — 1200000000 HC SEMI PRIVATE

## 2019-04-19 PROCEDURE — 99232 SBSQ HOSP IP/OBS MODERATE 35: CPT | Performed by: FAMILY MEDICINE

## 2019-04-19 PROCEDURE — 97535 SELF CARE MNGMENT TRAINING: CPT

## 2019-04-19 PROCEDURE — 6370000000 HC RX 637 (ALT 250 FOR IP): Performed by: FAMILY MEDICINE

## 2019-04-19 PROCEDURE — 99232 SBSQ HOSP IP/OBS MODERATE 35: CPT | Performed by: INTERNAL MEDICINE

## 2019-04-19 PROCEDURE — 97116 GAIT TRAINING THERAPY: CPT

## 2019-04-19 RX ADMIN — OXYCODONE HYDROCHLORIDE 10 MG: 10 TABLET, FILM COATED, EXTENDED RELEASE ORAL at 08:40

## 2019-04-19 RX ADMIN — OXYCODONE HYDROCHLORIDE 10 MG: 10 TABLET, FILM COATED, EXTENDED RELEASE ORAL at 21:28

## 2019-04-19 RX ADMIN — SENNOSIDES 8.6 MG: 8.6 TABLET, FILM COATED ORAL at 21:28

## 2019-04-19 RX ADMIN — DOCUSATE SODIUM 100 MG: 100 CAPSULE, LIQUID FILLED ORAL at 08:40

## 2019-04-19 RX ADMIN — Medication 150 MG: at 21:28

## 2019-04-19 RX ADMIN — MOMETASONE FUROATE AND FORMOTEROL FUMARATE DIHYDRATE 2 PUFF: 200; 5 AEROSOL RESPIRATORY (INHALATION) at 11:21

## 2019-04-19 RX ADMIN — Medication 150 MG: at 08:40

## 2019-04-19 RX ADMIN — Medication 10 ML: at 08:40

## 2019-04-19 RX ADMIN — METOPROLOL TARTRATE 25 MG: 25 TABLET ORAL at 21:28

## 2019-04-19 RX ADMIN — Medication 10 ML: at 21:28

## 2019-04-19 RX ADMIN — PANTOPRAZOLE SODIUM 40 MG: 40 TABLET, DELAYED RELEASE ORAL at 16:17

## 2019-04-19 RX ADMIN — MEGESTROL ACETATE 200 MG: 40 SUSPENSION ORAL at 08:40

## 2019-04-19 RX ADMIN — PANTOPRAZOLE SODIUM 40 MG: 40 TABLET, DELAYED RELEASE ORAL at 07:15

## 2019-04-19 RX ADMIN — CETIRIZINE HYDROCHLORIDE 10 MG: 10 TABLET ORAL at 08:40

## 2019-04-19 RX ADMIN — ATORVASTATIN CALCIUM 10 MG: 10 TABLET, FILM COATED ORAL at 08:40

## 2019-04-19 RX ADMIN — MOMETASONE FUROATE AND FORMOTEROL FUMARATE DIHYDRATE 2 PUFF: 200; 5 AEROSOL RESPIRATORY (INHALATION) at 21:02

## 2019-04-19 ASSESSMENT — PAIN SCALES - GENERAL
PAINLEVEL_OUTOF10: 4
PAINLEVEL_OUTOF10: 4
PAINLEVEL_OUTOF10: 3
PAINLEVEL_OUTOF10: 4
PAINLEVEL_OUTOF10: 1

## 2019-04-19 ASSESSMENT — PAIN DESCRIPTION - PAIN TYPE
TYPE: ACUTE PAIN
TYPE: ACUTE PAIN

## 2019-04-19 ASSESSMENT — PAIN DESCRIPTION - DESCRIPTORS
DESCRIPTORS: ACHING;DISCOMFORT;SORE
DESCRIPTORS: ACHING

## 2019-04-19 ASSESSMENT — PAIN DESCRIPTION - FREQUENCY: FREQUENCY: INTERMITTENT

## 2019-04-19 ASSESSMENT — PAIN DESCRIPTION - LOCATION
LOCATION: ABDOMEN
LOCATION: ABDOMEN

## 2019-04-19 ASSESSMENT — PAIN DESCRIPTION - ORIENTATION: ORIENTATION: RIGHT

## 2019-04-19 NOTE — PROGRESS NOTES
Occupational Therapy  Facility/Department: [address] ORTHO/MED SURG  Daily Treatment Note  NAME: Margaret Gonzalez  : 1945  MRN: [id number]    Date of Service: 2019    Discharge Recommendations: Further therapy recommended at discharge. Assessment   Performance deficits / Impairments: Decreased functional mobility ; Decreased strength;Decreased endurance;Decreased ADL status; Decreased high-level IADLs  Treatment Diagnosis: septicemia   Prognosis: Good  Patient Education: OT POC, transfer/walker safety, importance of participation in therapy with fair return. Activity Tolerance  Activity Tolerance: Patient limited by fatigue  Safety Devices  Safety Devices in place: Yes  Type of devices: Call light within reach; Chair alarm in place; Patient at risk for falls; Left in chair;Nurse notified         Patient Diagnosis(es): The primary encounter diagnosis was Septicemia ([facility] [address] Diagnoses of Nausea and vomiting, intractability of vomiting not specified, unspecified vomiting type, Dehydration, Right upper quadrant abdominal pain, Liver metastasis ([address] and Duodenal adenocarcinoma ([address] were also pertinent to this visit. 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 has a past surgical history that includes Hysterectomy; syl and bso (cervix removed); Pancreas surgery; Upper gastrointestinal endoscopy (N/A, 2019); Upper gastrointestinal endoscopy (2019); and Upper gastrointestinal endoscopy (N/A, 2019).     Restrictions  Restrictions/Precautions  Restrictions/Precautions: Fall Risk  Required Braces or Orthoses?: No  Position Activity Restriction  Other position/activity restrictions: up with assist, up as tolerated   Subjective   General  Chart Reviewed: No  Patient assessed for rehabilitation services?: Yes  Family / Caregiver Present: No  Diagnosis: septicemia   General Comment  Pain Assessment  Pain Assessment: 0-10  Pain Level: 4  Pain Type: Acute pain  Pain Location: Abdomen  Pain Orientation: Right  Pain Descriptors: Aching;Discomfort; Sore  Pain Frequency: Intermittent  Non-Pharmaceutical Pain Intervention(s): Distraction  Vital Signs  Patient Currently in Pain: Yes   Orientation  Orientation  Overall Orientation Status: Within Functional Limits  Objective    ADL  Grooming: Setup;Supervision; Increased time to complete(Wash face standing at sink.)  Additional Comments: Pt easily fatigued completing simple ADL task standing at sink. Balance  Sitting Balance: Supervision(Seated in chair.)  Standing Balance: Stand by assistance  Standing Balance  Time: 5 minutes  Activity: Functional mobility to/from bathroom using RW, simple ADL task standing at sink. Sit to stand: Contact guard assistance  Stand to sit: Contact guard assistance  Functional Mobility  Functional - Mobility Device: Rolling Walker  Activity: To/from bathroom  Assist Level: Contact guard assistance  Functional Mobility Comments: Pt displayed very slow guarded steps, pt easily fatigued. Pt c/o back pain during standing activity.   Cognition  Overall Cognitive Status: [facility]     Plan   Plan  Times per week: 3-4x/wk    Goals  Short term goals  Time Frame for Short term goals: pt will, by discharge  Short term goal 1: dem supervision during functional transfers/functional mobility with LRD, as needed  Short term goal 2: dem 5+ minutes dynamic standing tolerance with supervision in order to complete functional tasks  Short term goal 3: increase activity tolerance to 25+ minutes in order to participate in ADLs  Short term goal 4: complete LB ADLs with min A, set up and Ae, as needed  Short term goal 5: complete UB ADLs and grooming tasks with mod I and set up  Short term goal 6: dem understanding and ind initiate use of 2-3 energy conservation tech        Therapy Time   Individual Concurrent Group Co-treatment   Time In 1130         Time Out 1159         Minutes 29            Pt seated in chair upon therapist arrival. Pt agreeable to therapy with encouragement. See above for LOF for all tasks. Pt retired to seated in chair at end of session with chair alarm activated and call light within reach.     Dimple Salts, FISHER/L

## 2019-04-19 NOTE — PROGRESS NOTES
[address]    Progress Note    4/19/2019    12:33 PM    Name:   Melanie Taylor  MRN:     [id number]     [place]:      [de-identified]   Room:   [address] [place] [address] Day:  6  Admit Date:  4/13/2019 12:21 PM    PCP:   Carine Coon MD  Code Status:  DNR-CCA    Subjective:     C/C:   Chief Complaint   Patient presents with    Fatigue    Emesis     Interval History Status: improved. Patient seen and examined at bedside, no acute events. Still low appetite   Hb is better   WBCs still  elevated. Had an episode of nonsustained V. tach yesterday  Patient denies any chest pain, shortness of breath, chills, fevers, nausea or vomiting. Patient vitals, labs  were reviewed,from overnight shift and morning updates were noted and discussed with the nurse    Brief History:     Melanie Taylor is 72 y.o. female  Who was admitted to the hospital on 4/13/2019 for treatment of SIRS (systemic inflammatory response syndrome) ([facility] [address] Patient was brought to emergency room by friend for right upper quadrant pain, poor oral intake, loss of appetite, weight loss. 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  Patient had recent port placement and is waiting for chemotherapy.  Abdominal pain was severe, right upper quadrant, nonradiating, no worsening or relieving factors.  She had poor appetite, easy satiety, nausea persistent along with nonbloody nonbilious emesis.  Patient denies any fever, chills, difficulty breathing, wheezing, sputum production, dysuria, diarrhea.  She had significant weight loss in last few months.  Initial evaluation showed pulse 96, blood pressure 105/71, temperature 98. 1.  Lab work showed BUN 27, creatinine 1.2, lactic acid 3.3, albumin 2.8, bilirubin 0.9, , ALT 58, alk phos 668, WBC of 18.8, hemoglobin 8.8, platelet 405.  Chest x-ray showed no acute cardiac pulmonary process.  UA was negative for UTI . Patient was treated with empiric aztreonam and vanco for possible sepsis which was ruled out with negative blood cultures , CXR, UA and absence of symptoms for infection. Patient was treated with aggressive fluid resuscitation and lactic acidosis resolved     Review of Systems:     Constitutional:  negative for chills, fevers, sweats. Chronic fatigue and decreased appetite continues  Respiratory:  negative for cough, dyspnea on exertion, hemoptysis, shortness of breath, wheezing  Cardiovascular:  negative for chest pain, chest pressure/discomfort, lower extremity edema, palpitations  Gastrointestinal:  negative for abdominal pain, constipation, diarrhea, nausea, vomiting  Neurological:  negative for dizziness, headache    Medications: Allergies: Allergies   Allergen Reactions    Aspirin Anaphylaxis    Penicillins     Iv Dye [Iodides] Hives       Current Meds:   Scheduled Meds:    pantoprazole  40 mg Oral BID AC    sodium chloride  250 mL Intravenous Once    iron polysaccharides  150 mg Oral BID    senna  1 tablet Oral Nightly    docusate sodium  100 mg Oral Daily    mometasone-formoterol  2 puff Inhalation BID    fluticasone  1 spray Nasal Daily    cetirizine  10 mg Oral Daily    [Held by provider] amLODIPine  5 mg Oral Nightly    atorvastatin  10 mg Oral Daily    sodium chloride flush  10 mL Intravenous 2 times per day    megestrol  200 mg Oral Daily    oxyCODONE  10 mg Oral 2 times per day     Continuous Infusions:     PRN Meds: albuterol sulfate HFA, cromolyn, sodium chloride, nicotine polacrilex, sodium chloride flush, acetaminophen, oxyCODONE-acetaminophen, ondansetron    Data:     Past Medical History:   has a past medical history of Alcohol abuse, Duodenal adenocarcinoma ([facility] [address] Hyperlipidemia, Hypertension, Liver metastasis ([facility] [address] Liver metastasis ([facility] [address] Pancreatitis, and Stone, kidney. Social History:   reports that she quit smoking about 4 years ago.  Her smoking use included cigarettes. She has a 9.00 pack-year smoking history. She has never used smokeless tobacco. She reports that she drinks about 0.6 oz of alcohol per week. She reports that she does not use drugs. Family History: History reviewed. No pertinent family history. Vitals:  /68   Pulse 91   Temp 98.5 °F (36.9 °C) (Oral)   Resp 18   Ht 5' 3\" (1.6 m)   Wt 166 lb 11.2 oz (75.6 kg)   SpO2 99%   BMI 29.53 kg/m²   Temp (24hrs), Av.3 °F (36.8 °C), Min:98.1 °F (36.7 °C), Max:98.5 °F (36.9 °C)    No results for input(s): POCGLU in the last 72 hours. I/O (24Hr): No intake or output data in the 24 hours ending 19 1233    Labs:    Hematology:  Recent Labs     19  1017 19  0833   WBC 15.2* 15.0*   RBC 3.14* 3.32*   HGB 7.8* 8.2*   HCT 26.1* 28.2*   MCV 83.1 84.9   MCH 24.8* 24.7*   MCHC 29.9 29.1   RDW 17.9* 19.0*    384   MPV 9.3 9.7     Chemistry:  Recent Labs     19  1509      K 4.0      CO2 17*   GLUCOSE 90   BUN 10   CREATININE 0.67   ANIONGAP 16   LABGLOM >60   GFRAA >60   CALCIUM 8.5*     No results for input(s): PROT, LABALBU, LABA1C, W7YEPQE, Z0LACRK, FT4, TSH, AST, ALT, LDH, GGT, ALKPHOS, LABGGT, BILITOT, BILIDIR, AMMONIA, AMYLASE, LIPASE, LACTATE, CHOL, HDL, LDLCHOLESTEROL, CHOLHDLRATIO, TRIG, VLDL, GHC11NO, PHENYTOIN, PHENYF, URICACID, POCGLU in the last 72 hours.       Lab Results   Component Value Date/Time    SPECIAL NOT REPORTED 2019 07:16 PM     Lab Results   Component Value Date/Time    CULTURE NO SIGNIFICANT GROWTH 2019 07:16 PM       Lab Results   Component Value Date    FIO2 NOT REPORTED 2019       Radiology:        Physical Examination:        General appearance:  alert, cooperative and no distress  Mental Status:  oriented to person, place and time and normal affect  Lungs:  clear to auscultation bilaterally, normal effort, port noted  Heart:  regular rate and rhythm, no murmur  Abdomen:  soft, nontender, nondistended, normal bowel sounds, no masses, hepatomegaly, splenomegaly  Extremities:  no edema, redness, tenderness in the calves  Skin:  no gross lesions, rashes, induration    Assessment:        Primary Problem  SIRS (systemic inflammatory response syndrome) ([facility] [address]    Active Hospital Problems    Diagnosis Date Noted    Anemia in other chronic diseases classified elsewhere [D63.8]     Encounter for palliative care [Z51.5]     Nausea and vomiting [R11.2]     Septicemia ([facility] [address] [A41.9]     Lactic acid acidosis [E87.2] 04/13/2019    SIRS (systemic inflammatory response syndrome) (HCC) [R65.10] 04/13/2019    Dehydration [E86.0] 04/13/2019    Moderate malnutrition (HCC) [E44.0] 04/13/2019    Loss of appetite [R63.0] 04/13/2019    Right upper quadrant pain [R10.11] 04/13/2019    Liver metastasis (HCC) [C78.7]     Duodenal adenocarcinoma (HCC) [C17.0]     Chronic bronchitis ([facility] [address] [J42] 05/25/2017    Stage 3 chronic kidney disease ([facility] [address] [N18.3] 06/09/2015    Essential hypertension [I10] 01/27/2012    Mitral valve prolapse [I34.1] 01/27/2012       Plan:        Acute on chronic anemia : S/P Tx 1 U PRBCs, seen GI , no need for EGD given known pathology , continue PPI    SIRS: roled out    Lactic acidosis : resolved with hydration, DC fluids     Dehydration: resolved, advance diet    MARIA VICTORIA: resolved    Hypokalemia: replaced    Leukocytosis: mostly reactive, no evidence of infection    Metastatic duodenal adenocarcinoma with metastases to liver: Plan for outpatient chemo on 4/17, appreciate oncologyrecommendations     Moderate protein calorie malnutrition secondary to decreased appetite: Continue on boost supplements, continue megace     GI prophylaxis    Discussed with the patient in detail in presence of the nurse , changed code status to [facility]      Run of NSVT: cardiology involved     [address]  4/19/2019  12:33 PM

## 2019-04-19 NOTE — CARE COORDINATION
Spoke with Elizabeth Cannon in Palliative are. She states can have [facility] home care and [address] both. Call to Dr Heidy Acosta, order placed for palliative eval.  Spoke with Ms [facility] she is agreeable to [address] along with Michelle Tristan. Call to [address], spoke with Aashish Trujillo. She just needs face sheet with address of where she will be staying faxed to them.   They have access to care link

## 2019-04-19 NOTE — CONSULTS
[facility] Cardiology Cardiology    Consult / H&P               Today's Date: 4/19/2019  Patient Name: Leah Maldonado  Date of admission: 4/13/2019 12:21 PM  Patient's age: 72 y.o., 1945  Admission Dx: SIRS (systemic inflammatory response syndrome) ([facility] [address] [R65.10]    Reason for Consult:  Cardiac evaluation    Requesting Physician: Travis Perez MD    CHIEF COMPLAINT:      History Obtained From:  patient, electronic medical record    HISTORY OF PRESENT ILLNESS:      The patient is a 72 y.o.  female who is admitted to the hospital for    poor appetite, unintentional weight loss and RUQ abdominal pain-non-radiating, dull , and constant that has been present for approx 2 months. Pt EGD showed invasive adenocarcinoma of the duodenum with bx indicating duodenum as primary source with mets to liver after liver . The patient is very weak and fatigued and her performance status is ECOG 2. Palliative care recommended by the Oncology with plan to discharge with hospice and no chemotherapy    @ cardiology has been consulted for NSVT    EKG SR, PVCs  Noted. Non specific ST/T Changes noted. Her stress test in 6/17 was normal      Denies any chest pain, shortness of breath and palpitation  Tele monitor reviewed. NSVT on tele monitor        Past Medical History:   has a past medical history of Alcohol abuse, Duodenal adenocarcinoma ([facility] [address] Hyperlipidemia, Hypertension, Liver metastasis ([facility] [address] Liver metastasis ([address] Pancreatitis, and Stone, kidney. Past Surgical History:   has a past surgical history that includes Hysterectomy; syl and bso (cervix removed); Pancreas surgery; Upper gastrointestinal endoscopy (N/A, 2/20/2019); Upper gastrointestinal endoscopy (2/20/2019); and Upper gastrointestinal endoscopy (N/A, 2/22/2019). Home Medications:    Prior to Admission medications    Medication Sig Start Date End Date Taking?  Authorizing Provider   oxyCODONE-acetaminophen (PERCOCET) 5-325 MG per tablet Take 1 tablet by mouth every 6 hours as needed for Pain. Yes Historical Provider, MD   atorvastatin (LIPITOR) 40 MG tablet take 1 tablet by mouth once daily 4/5/19   Becky Hoover MD   Nutritional Supplements (ENSURE ORIGINAL) LIQD Take 1 Can by mouth 3 times daily 3/7/19   Brandi Morales MD   amLODIPine (NORVASC) 5 MG tablet take 1 tablet by mouth once daily 3/5/19   Becky Hoover MD   valsartan-hydrochlorothiazide (DIOVAN-HCT) 160-25 MG per tablet take 1 tablet by mouth once daily 1/22/19   Becky Hoover MD   albuterol sulfate HFA (PROAIR HFA) 108 (90 Base) MCG/ACT inhaler Inhale 2 puffs into the lungs every 6 hours as needed for Wheezing 10/19/18   Rita Singh MD   budesonide-formoterol (SYMBICORT) 160-4.5 MCG/ACT AERO Inhale 2 puffs into the lungs 2 times daily 10/19/18   Rita Singh MD   cromolyn (OPTICROM) 4 % ophthalmic solution Place 1 drop into both eyes 4 times daily 10/19/18   Rita Singh MD   zoster recombinant adjuvanted vaccine (SHINGRIX) 50 MCG SUSR injection 50 MCG IM then repeat 2-6 months.  7/17/18 7/17/19  Ifeoma Her MD      Current Facility-Administered Medications: metoprolol tartrate (LOPRESSOR) tablet 25 mg, 25 mg, Oral, BID  pantoprazole (PROTONIX) tablet 40 mg, 40 mg, Oral, BID AC  0.9 % sodium chloride bolus, 250 mL, Intravenous, Once  iron polysaccharides (NIFEREX) capsule 150 mg, 150 mg, Oral, BID  senna (SENOKOT) tablet 8.6 mg, 1 tablet, Oral, Nightly  docusate sodium (COLACE) capsule 100 mg, 100 mg, Oral, Daily  albuterol sulfate  (90 Base) MCG/ACT inhaler 2 puff, 2 puff, Inhalation, Q6H PRN  mometasone-formoterol (DULERA) 200-5 MCG/ACT inhaler 2 puff, 2 puff, Inhalation, BID  fluticasone (FLONASE) 50 MCG/ACT nasal spray 1 spray, 1 spray, Nasal, Daily  cetirizine (ZYRTEC) tablet 10 mg, 10 mg, Oral, Daily  cromolyn (OPTICROM) 4 % ophthalmic solution 1 drop, 1 drop, Both Eyes, 4x Daily PRN  sodium chloride (OCEAN) 0.65 % nasal spray 1 spray, 1 spray, Nasal, PRN  nicotine polacrilex (NICORETTE) gum 2 mg, 2 mg, Oral, PRN  [Held by provider] amLODIPine (NORVASC) tablet 5 mg, 5 mg, Oral, Nightly  atorvastatin (LIPITOR) tablet 10 mg, 10 mg, Oral, Daily  sodium chloride flush 0.9 % injection 10 mL, 10 mL, Intravenous, 2 times per day  sodium chloride flush 0.9 % injection 10 mL, 10 mL, Intravenous, PRN  acetaminophen (TYLENOL) tablet 650 mg, 650 mg, Oral, Q4H PRN  megestrol (MEGACE) 40 MG/ML suspension 200 mg, 200 mg, Oral, Daily  oxyCODONE (OXYCONTIN) extended release tablet 10 mg, 10 mg, Oral, 2 times per day  oxyCODONE-acetaminophen (PERCOCET) 5-325 MG per tablet 1 tablet, 1 tablet, Oral, Q4H PRN  ondansetron (ZOFRAN) injection 4 mg, 4 mg, Intravenous, Q6H PRN    Allergies:  Aspirin; Penicillins; and Iv dye [iodides]    Social History:   reports that she quit smoking about 4 years ago. Her smoking use included cigarettes. She has a 9.00 pack-year smoking history. She has never used smokeless tobacco. She reports that she drinks about 0.6 oz of alcohol per week. She reports that she does not use drugs. Family History: family history is not on file. No h/o sudden cardiac death. No for premature CAD    REVIEW OF SYSTEMS:    · Constitutional: tired and lethargy  · Eyes: No visual changes or diplopia. No scleral icterus. · ENT: No Headaches  · Cardiovascular:  Mentioned above  · Respiratory: No previous pulmonary problems, No cough  · Gastrointestinal:GI bleeding  · Neurological: No headache, diplopia, change in muscle strength, numbness or tingling. No change in gait, balance, coordination, mood, affect, memory, mentation, behavior. · Psychiatric: No anxiety, or depression. · Endocrine: No temperature intolerance. No excessive thirst, fluid intake, or urination. No tremor. · Hematologic/Lymphatic: H/O metastatic cancer  · Allergic/Immunologic: No nasal congestion or hives.       PHYSICAL EXAM:      /68   Pulse 91   Temp 98.5 °F (36.9 °C) (Oral)   Resp 18 Ht 5' 3\" (1.6 m)   Wt 166 lb 11.2 oz (75.6 kg)   SpO2 99%   BMI 29.53 kg/m²    Constitutional and General Appearance: alert, cooperative, no distress and appears stated age  [de-identified]: PERRL, no cervical lymphadenopathy. No masses palpable. Normal oral mucosa  Respiratory:  · Normal excursion and expansion without use of accessory muscles  · Resp Auscultation: basal rales present  Cardiovascular:  · The apical impulse is not displaced  · Heart tones are crisp and normal. regular S1 and S2. Abdomen:   · No masses or tenderness  · Bowel sounds present  Extremities:  ·  No Cyanosis or Clubbing  ·  Lower extremity edema: yes  ·  Skin: Warm and dry  Neurological:  · Alert and oriented. · Moves all extremities well  · No abnormalities of mood, affect, memory, mentation, or behavior are noted    DATA:    Diagnostics:    EKG:  SR    Labs:     CBC:   Recent Labs     04/17/19  1017 04/19/19  0833   WBC 15.2* 15.0*   HGB 7.8* 8.2*   HCT 26.1* 28.2*    384     BMP:   Recent Labs     04/18/19  1509      K 4.0   CO2 17*   BUN 10   CREATININE 0.67   LABGLOM >60   GLUCOSE 90     BNP: No results for input(s): BNP in the last 72 hours. PT/INR: No results for input(s): PROTIME, INR in the last 72 hours. APTT:No results for input(s): APTT in the last 72 hours. CARDIAC ENZYMES:No results for input(s): CKTOTAL, CKMB, CKMBINDEX, TROPONINI in the last 72 hours. FASTING LIPID PANEL:  Lab Results   Component Value Date    HDL 72 12/14/2017    TRIG 145 12/14/2017     LIVER PROFILE:No results for input(s): AST, ALT, LABALBU in the last 72 hours.     IMPRESSION:    Patient Active Problem List   Diagnosis    Essential hypertension    Mitral valve prolapse    Stage 3 chronic kidney disease (HCC)    Mixed hyperlipidemia    Chronic bronchitis (HCC)    Stopped smoking    Abdominal pain    Duodenal mass    Abnormal findings on diagnostic imaging of digestive system    Hepatic lesion    Ischemic colitis ([facility] [address]    Liver

## 2019-04-19 NOTE — PLAN OF CARE
Problem: Discharge Planning:  Goal: Discharged to appropriate level of care  Description  Discharged to appropriate level of care  4/19/2019 1513 by Kumar Klein RN  Outcome: Ongoing  4/19/2019 0457 by David Espinal RN  Outcome: Ongoing     Problem: Gas Exchange - Impaired:  Goal: Levels of oxygenation will improve  Description  Levels of oxygenation will improve  Outcome: Ongoing     Problem: Tissue Perfusion, Altered:  Goal: Circulatory function within specified parameters  Description  Circulatory function within specified parameters  Outcome: Ongoing     Problem: Falls - Risk of:  Goal: Will remain free from falls  Description  Will remain free from falls  4/19/2019 1513 by Kumar Klein RN  Outcome: Ongoing  4/19/2019 0457 by David Espinal RN  Outcome: Ongoing  Goal: Absence of physical injury  Description  Absence of physical injury  4/19/2019 1513 by Kumar Klein RN  Outcome: Ongoing  4/19/2019 0457 by David Espinal RN  Outcome: Ongoing     Problem: Pain:  Goal: Pain level will decrease  Description  Pain level will decrease  4/19/2019 1513 by Kumar Klein RN  Outcome: Ongoing  4/19/2019 0457 by David Espinal RN  Outcome: Ongoing  Goal: Control of acute pain  Description  Control of acute pain  4/19/2019 1513 by Kumar Klein RN  Outcome: Ongoing  4/19/2019 0457 by David Espinal RN  Outcome: Ongoing  Goal: Control of chronic pain  Description  Control of chronic pain  Outcome: Ongoing     Problem: Musculor/Skeletal Functional Status  Goal: Highest potential functional level  Outcome: Ongoing     Problem: Nutrition  Goal: Optimal nutrition therapy  4/19/2019 1513 by Kumar Klein RN  Outcome: Ongoing  4/19/2019 1354 by Cecilia Thao RD, LD  Outcome: Ongoing  Note:   Nutrition Problem: Inadequate oral intake  Intervention: Food and/or Nutrient Delivery: Modify current diet, Start ONS  Nutritional Goals: Pt to consume >75% of meals/supplements

## 2019-04-19 NOTE — PROGRESS NOTES
Today's Date: 4/19/2019  Patient Name: Jayda Nails  Date of admission: 4/13/2019 12:21 PM  Patient's age: 72 y.o., 1945  Admission Dx: SIRS (systemic inflammatory response syndrome) ([facility] [address] [R65.10]      Requesting Physician: Naseem Beatty MD    CHIEF COMPLAINT:  Nausea and vomiting. INTERIM HISTORY  Patient is seen and evaluated. She overall feels well. She decided to go home with palliative care. She denies any abdominal pain, no nausea or vomiting. HISTORY OF PRESENT ILLNESS:      The patient is a 72 y.o.  female who is admitted to the hospital for abdominal pain, nausea and vomiting and weight loss. She was recently diagnosed with small intestinal tumor with biopsy proven liver metastases. She underwent Mediport insertion and she was planned for chemotherapy. The patient condition has been deteriorating. She lost significant weight. She is cachectic and very weak. 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 Past Surgical History:   has a past surgical history that includes Hysterectomy; syl and bso (cervix removed); Pancreas surgery; Upper gastrointestinal endoscopy (N/A, 2/20/2019); Upper gastrointestinal endoscopy (2/20/2019); and Upper gastrointestinal endoscopy (N/A, 2/22/2019). Family History: family history is not on file. Social History:   reports that she quit smoking about 4 years ago. Her smoking use included cigarettes. She has a 9.00 pack-year smoking history. She has never used smokeless tobacco. She reports that she drinks about 0.6 oz of alcohol per week. She reports that she does not use drugs.    Medications:    Reviewed in EPIC     Allergies:  Aspirin; Penicillins; and Iv dye [iodides]    REVIEW OF SYSTEMS:      Review of Systems  General: no fever or night sweats, Weight loss, fatigue   ENT: No double or blurred vision, no tinnitus or hearing problem, no dysphagia or sore throat   Respiratory: No chest pain, no shortness of breath, no cough or hemoptysis. Cardiovascular: Denies chest pain, PND or orthopnea. No L E swelling or palpitations. Gastrointestinal:    abd pain, nausea and vomiting. Genitourinary: Denies dysuria, hematuria, frequency, urgency or incontinence. Neurological: Denies headaches, decreased LOC, no sensory or motor focal deficits. Musculoskeletal:  No arthralgia no back pain or joint swelling. Skin: There are no rashes or bleeding. Psychiatric:  No anxiety, no depression. Endocrine: no diabetes or thyroid disease. Hematologic: no bleeding , no adenopathy. PHYSICAL EXAM:      /68   Pulse 91   Temp 98.5 °F (36.9 °C) (Oral)   Resp 18   Ht 5' 3\" (1.6 m)   Wt 166 lb 11.2 oz (75.6 kg)   SpO2 99%   BMI 29.53 kg/m²    Temp (24hrs), Av.3 °F (36.8 °C), Min:98.1 °F (36.7 °C), Max:98.5 °F (36.9 °C)      Physical Exam  Gen.  Exam, showed a well-appearing patient without evidence of distress or pain, cachectic, weak   HEENT, normocephalic and atraumatic, PERRLA extraocular muscles are intact  Neck showed no JVD no carotid bruit, no cervical adenopathy  Chest is clear to auscultation bilaterally  Heart is regular without any murmur  Abdomen mild diffuse tenderness   Lower extremities showed no edema clubbing or cyanosis  Neurological examination was nonfocal, with intact cranial nerves  Skin  No rashes or bruising appreciated          DATA:      Labs:       CBC:   Recent Labs     19  1017 19  0833   WBC 15.2* 15.0*   HGB 7.8* 8.2*   HCT 26.1* 28.2*    384     BMP:   Recent Labs     19  1509      K 4.0   CO2 17*   BUN 10   CREATININE 0.67   LABGLOM >60   GLUCOSE 90     PT/INR:   No results for input(s): PROTIME, INR in the last 72 hours. APTT:No results for input(s): APTT in the last 72 hours. LIVER PROFILE:  No results for input(s): AST, ALT, LABALBU in the last 72 hours. IMPRESSION:    Primary Problem  SIRS (systemic inflammatory response syndrome) ([facility] [address]    Active Hospital Problems    Diagnosis Date Noted    Anemia in other chronic diseases classified elsewhere [D63.8]     Encounter for palliative care [Z51.5]     Nausea and vomiting [R11.2]     Septicemia ([address] [A41.9]     Lactic acid acidosis [E87.2] 04/13/2019    SIRS (systemic inflammatory response syndrome) ([facility] [address] [R65.10] 04/13/2019    Dehydration [E86.0] 04/13/2019    Moderate malnutrition ([address] [E44.0] 04/13/2019    Loss of appetite [R63.0] 04/13/2019    Right upper quadrant pain [R10.11] 04/13/2019    Liver metastasis (HCC) [C78.7]     Duodenal adenocarcinoma (HCC) [C17.0]     Chronic bronchitis ([facility] [address] [J42] 05/25/2017    Stage 3 chronic kidney disease ([facility] [address] [N18.3] 06/09/2015    Essential hypertension [I10] 01/27/2012    Mitral valve prolapse [I34.1] 01/27/2012       RECOMMENDATIONS:  1. 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 she is technically a candidate for systemic therapy. I think it is more important to determine goals of care. The patient is very weak and fatigued and her performance status is ECOG 2.  2. Decision not to go through any treatment, arrange for palliative care at home   3. Okay to discharge. We will be available as needed. She'll need palliative care hospice.       Bigg Marie MD   [phone]

## 2019-04-19 NOTE — PROGRESS NOTES
Nutrition Assessment    Type and Reason for Visit: Initial(LOS Day 6)    Nutrition Recommendations: Recommend liberalzing diet to general diet to allow >po intake. Suggest ensure clear supplements 3x/d. Continue appetite stimulant. 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 Pt stated her appetite is \"ok\" and is consuming 50% of her meals. Per EMR, wt has flux from 176-190 lbs over 10 mo. Pt w/ 2.9% wt loss over 1 mo, not considered significant. Pt is unsure of UBW. Pt reports that she dislikes ensure and magic cup supplements but is willing to try ensure clear supplements. Will add supplements 2/2 increased nutrient needs and monitor po intake. Malnutrition Assessment:  · Malnutrition Status: At risk for malnutrition  · Context: Chronic illness  · Findings of the 6 clinical characteristics of malnutrition (Minimum of 2 out of 6 clinical characteristics is required to make the diagnosis of moderate or severe Protein Calorie Malnutrition based on AND/ASPEN Guidelines):  1. Energy Intake-Less than or equal to 50% of estimated energy requirement, Greater than or equal to 5 days    2. Weight Loss-<3% x 1 mo  3. Fat Loss-No significant subcutaneous fat loss,    4. Muscle Loss-No significant muscle mass loss,    5. Fluid Accumulation-No significant fluid accumulation, Extremities  6.  Strength- Not measured     Nutrition Risk Level:  Moderate    Nutrient Needs:  · Estimated Daily Total Kcal: 1.3-1.5 ~>8020-0220 kcals/d   · Estimated Daily Protein (g): 1.3-1.5 gm/kg ~>68-78 gms/d     Nutrition Diagnosis:   · Problem: Inadequate oral intake  · Etiology: related to Catabolic illness, Insufficient energy/nutrient consumption     Signs and symptoms:  as evidenced by Intake 50-75%(pt report/interview)    Objective Information:  · Wound Type: None  · Current Nutrition Therapies:  · Oral Diet Orders: 2gm Sodium, No Added Salt (3-4gm)   · Oral Diet intake: (50% per pt)  · Oral Nutrition Supplement (ONS) Orders: None  · Anthropometric Measures:  · Ht: 5' 3\" (160 cm)   · Current Body Wt: 166 lb (75.3 kg)  · Admission Body Wt: 154 lb (69.9 kg)  · % Weight Change:  ,  176-190 lbs over 10 mo per EMR   · Ideal Body Wt: 115 lb (52.2 kg), % Ideal Body 134% (Adm/ideal)  · BMI Classification: BMI 25.0 - 29.9 Overweight    Nutrition Interventions:   Modify current diet, Start ONS  Continued Inpatient Monitoring, Education not appropriate at this time    Nutrition Evaluation:   · Evaluation: Goals set   · Goals: Pt to consume >75% of meals/supplements     · Monitoring: Nutrition Progression, Meal Intake, Supplement Intake, Diet Tolerance, I&O, Weight, Pertinent Labs, Monitor Bowel Function      Electronically signed by Bola Sanchez RD, LD on 4/19/19 at 1:49 PM    Contact Number: [phone]

## 2019-04-19 NOTE — PROGRESS NOTES
Physical Therapy  Facility/Department: [address] ORTHO/MED SURG  Daily Treatment Note  NAME: Che Torre  : 1945  MRN: [id number]    Date of Service: 2019    Discharge Recommendations:  Further therapy recommended at discharge. PT Equipment Recommendations  Equipment Needed: Yes  Mobility Devices: Cali Sharp: Rolling    Patient Diagnosis(es): The primary encounter diagnosis was Septicemia ([facility] [address] Diagnoses of Nausea and vomiting, intractability of vomiting not specified, unspecified vomiting type, Dehydration, Right upper quadrant abdominal pain, Liver metastasis ([address] and Duodenal adenocarcinoma ([address] were also pertinent to this visit. has a past medical history of Alcohol abuse, Duodenal adenocarcinoma ([facility] [address] Hyperlipidemia, Hypertension, Liver metastasis ([address] Liver metastasis ([address] Pancreatitis, and Stone, kidney. has a past surgical history that includes Hysterectomy; syl and bso (cervix removed); Pancreas surgery; Upper gastrointestinal endoscopy (N/A, 2019); Upper gastrointestinal endoscopy (2019); and Upper gastrointestinal endoscopy (N/A, 2019). Restrictions  Restrictions/Precautions  Restrictions/Precautions: Fall Risk  Required Braces or Orthoses?: No  Position Activity Restriction  Other position/activity restrictions: up with assist, up as tolerated   Subjective   General  Chart Reviewed: Yes  Response To Previous Treatment: Patient with no complaints from previous session. Family / Caregiver Present: No  Subjective  Subjective: Pt in bed; reports 3-4/10 abdominal pain. General Comment  Comments: Left in chair; alarm activated; call light in reach.    Pain Screening  Patient Currently in Pain: Yes  Pain Assessment  Pain Assessment: 0-10  Pain Level: 4  Pain Type: Acute pain  Pain Location: Abdomen  Pain Descriptors: Aching  Vital Signs  Patient Currently in Pain: Yes       Orientation  Orientation  Overall Orientation Status: Within Normal Limits  Cognition      Objective   Bed mobility  Supine to Sit: Modified independent  Scooting: Modified independent  Transfers  Sit to Stand: Stand by assistance  Stand to sit: Stand by assistance  Ambulation  Ambulation?: Yes  Ambulation 1  Surface: level tile  Device: Rolling Walker  Assistance: Contact guard assistance  Quality of Gait: VERY decreased pace, heavy reliance on RW for support, decreased step length, flexed posture  Distance: 30ft  Comments: limited by fatigue  Stairs/Curb  Stairs?: No     Balance  Posture: Good  Sitting - Static: Good  Sitting - Dynamic: Good  Standing - Static: Fair;+  Standing - Dynamic: Fair       Exercises:  Seated: LAQs x 15 reps  Heel/toe raises x 15, pt stated \"No more, Im too tired\"     Assessment   Body structures, Functions, Activity limitations: Decreased functional mobility ; Decreased endurance;Decreased balance;Decreased strength  Assessment: Fatigues very quickly; poor tolerance to activity  Prognosis: Fair  REQUIRES PT FOLLOW UP: Yes  Activity Tolerance  Activity Tolerance: Patient limited by fatigue;Patient limited by endurance     Goals  Short term goals  Time Frame for Short term goals: 10 visits  Short term goal 1: Transfers with SBA  Short term goal 2: amb 125 ft with a RW x SBA  Short term goal 3: ascend/descend 4 steps with SBA  Short term goal 4: 20-30 min exercise programx SBA  Patient Goals   Patient goals : Return home    Plan    Plan  Times per week: 5-6x wk  Current Treatment Recommendations: Strengthening, Transfer Training, Endurance Training, Gait Training, Functional Mobility Training, Stair training, Safety Education & Training  Safety Devices  Type of devices:  All fall risk precautions in place, Left in chair, Patient at risk for falls, Call light within reach, Gait belt, Chair alarm in place, Nurse notified  Restraints  Initially in place: No     Therapy Time   Individual Concurrent Group Co-treatment   Time In 0910         Time Out 0104

## 2019-04-19 NOTE — PLAN OF CARE
Problem: Discharge Planning:  Goal: Discharged to appropriate level of care  Description  Discharged to appropriate level of care  4/19/2019 0457 by David Espinal RN  Outcome: Ongoing     Problem: Falls - Risk of:  Goal: Will remain free from falls  Description  Will remain free from falls  4/19/2019 0457 by David Espinal RN  Outcome: Ongoing     Problem: Falls - Risk of:  Goal: Absence of physical injury  Description  Absence of physical injury  4/19/2019 0457 by David Espinal RN  Outcome: Ongoing

## 2019-04-20 VITALS
SYSTOLIC BLOOD PRESSURE: 103 MMHG | TEMPERATURE: 98 F | HEART RATE: 79 BPM | WEIGHT: 166.7 LBS | DIASTOLIC BLOOD PRESSURE: 58 MMHG | OXYGEN SATURATION: 99 % | RESPIRATION RATE: 18 BRPM | BODY MASS INDEX: 29.54 KG/M2 | HEIGHT: 63 IN

## 2019-04-20 LAB
EKG ATRIAL RATE: 98 BPM
EKG P AXIS: 36 DEGREES
EKG P-R INTERVAL: 122 MS
EKG Q-T INTERVAL: 312 MS
EKG QRS DURATION: 82 MS
EKG QTC CALCULATION (BAZETT): 392 MS
EKG R AXIS: -3 DEGREES
EKG T AXIS: 129 DEGREES
EKG VENTRICULAR RATE: 95 BPM

## 2019-04-20 PROCEDURE — 94640 AIRWAY INHALATION TREATMENT: CPT

## 2019-04-20 PROCEDURE — 99239 HOSP IP/OBS DSCHRG MGMT >30: CPT | Performed by: FAMILY MEDICINE

## 2019-04-20 PROCEDURE — 2580000003 HC RX 258: Performed by: FAMILY MEDICINE

## 2019-04-20 PROCEDURE — 6370000000 HC RX 637 (ALT 250 FOR IP): Performed by: STUDENT IN AN ORGANIZED HEALTH CARE EDUCATION/TRAINING PROGRAM

## 2019-04-20 PROCEDURE — 97110 THERAPEUTIC EXERCISES: CPT

## 2019-04-20 PROCEDURE — 94762 N-INVAS EAR/PLS OXIMTRY CONT: CPT

## 2019-04-20 PROCEDURE — 6370000000 HC RX 637 (ALT 250 FOR IP): Performed by: FAMILY MEDICINE

## 2019-04-20 RX ORDER — MEGESTROL ACETATE 40 MG/ML
200 SUSPENSION ORAL DAILY
Qty: 240 ML | Refills: 3 | Status: SHIPPED | OUTPATIENT
Start: 2019-04-21

## 2019-04-20 RX ORDER — PANTOPRAZOLE SODIUM 40 MG/1
40 TABLET, DELAYED RELEASE ORAL
Qty: 30 TABLET | Refills: 3 | Status: SHIPPED | OUTPATIENT
Start: 2019-04-20

## 2019-04-20 RX ORDER — IRON POLYSACCHARIDE COMPLEX 150 MG
150 CAPSULE ORAL 2 TIMES DAILY
Qty: 60 CAPSULE | Refills: 3 | Status: SHIPPED | OUTPATIENT
Start: 2019-04-20

## 2019-04-20 RX ORDER — PSEUDOEPHEDRINE HCL 30 MG
100 TABLET ORAL DAILY
Qty: 30 CAPSULE | Refills: 3 | Status: SHIPPED | OUTPATIENT
Start: 2019-04-21

## 2019-04-20 RX ADMIN — FLUTICASONE PROPIONATE 1 SPRAY: 50 SPRAY, METERED NASAL at 08:15

## 2019-04-20 RX ADMIN — MOMETASONE FUROATE AND FORMOTEROL FUMARATE DIHYDRATE 2 PUFF: 200; 5 AEROSOL RESPIRATORY (INHALATION) at 07:53

## 2019-04-20 RX ADMIN — DOCUSATE SODIUM 100 MG: 100 CAPSULE, LIQUID FILLED ORAL at 08:15

## 2019-04-20 RX ADMIN — PANTOPRAZOLE SODIUM 40 MG: 40 TABLET, DELAYED RELEASE ORAL at 06:54

## 2019-04-20 RX ADMIN — OXYCODONE HYDROCHLORIDE 10 MG: 10 TABLET, FILM COATED, EXTENDED RELEASE ORAL at 08:15

## 2019-04-20 RX ADMIN — Medication 150 MG: at 08:15

## 2019-04-20 RX ADMIN — ATORVASTATIN CALCIUM 10 MG: 10 TABLET, FILM COATED ORAL at 08:15

## 2019-04-20 RX ADMIN — METOPROLOL TARTRATE 12.5 MG: 25 TABLET ORAL at 08:41

## 2019-04-20 RX ADMIN — Medication 10 ML: at 08:15

## 2019-04-20 RX ADMIN — MEGESTROL ACETATE 200 MG: 40 SUSPENSION ORAL at 08:15

## 2019-04-20 RX ADMIN — CETIRIZINE HYDROCHLORIDE 10 MG: 10 TABLET ORAL at 08:15

## 2019-04-20 ASSESSMENT — PAIN DESCRIPTION - PROGRESSION: CLINICAL_PROGRESSION: NOT CHANGED

## 2019-04-20 ASSESSMENT — PAIN DESCRIPTION - ORIENTATION: ORIENTATION: LOWER;RIGHT

## 2019-04-20 ASSESSMENT — PAIN SCALES - WONG BAKER: WONGBAKER_NUMERICALRESPONSE: 2

## 2019-04-20 ASSESSMENT — PAIN SCALES - GENERAL: PAINLEVEL_OUTOF10: 4

## 2019-04-20 ASSESSMENT — PAIN DESCRIPTION - DESCRIPTORS: DESCRIPTORS: DISCOMFORT

## 2019-04-20 ASSESSMENT — PAIN DESCRIPTION - PAIN TYPE: TYPE: ACUTE PAIN

## 2019-04-20 ASSESSMENT — PAIN DESCRIPTION - ONSET: ONSET: ON-GOING

## 2019-04-20 ASSESSMENT — PAIN DESCRIPTION - LOCATION: LOCATION: ABDOMEN

## 2019-04-20 ASSESSMENT — PAIN DESCRIPTION - FREQUENCY: FREQUENCY: INTERMITTENT

## 2019-04-20 NOTE — PROGRESS NOTES
Mahsa Bowling 19    Progress Note    4/20/2019    10:42 AM    Name:   Leopold Capri  MRN:     [id number]     Leticia Angela:      [de-identified]   Room:   [address] [place] [address] Day:  7  Admit Date:  4/13/2019 12:21 PM    PCP:   Gildardo Stratton MD  Code Status:  DNR-CCA    Subjective:     C/C:   Chief Complaint   Patient presents with    Fatigue    Emesis     Interval History Status: improved. Patient seen and examined at bedside, no acute events. Still low appetite   Hb is better   WBCs still  elevated. Started on BB overnight, dose adjusted 2/2 low BP   Patient denies any chest pain, shortness of breath, chills, fevers, nausea or vomiting. Patient vitals, labs  were reviewed,from overnight shift and morning updates were noted and discussed with the nurse    Brief History:     Leopold Capri is 72 y.o. female  Who was admitted to the hospital on 4/13/2019 for treatment of SIRS (systemic inflammatory response syndrome) ([facility] [address] Patient was brought to emergency room by friend for right upper quadrant pain, poor oral intake, loss of appetite, weight loss. 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  Patient had recent port placement and is waiting for chemotherapy.  Abdominal pain was severe, right upper quadrant, nonradiating, no worsening or relieving factors.  She had poor appetite, easy satiety, nausea persistent along with nonbloody nonbilious emesis.  Patient denies any fever, chills, difficulty breathing, wheezing, sputum production, dysuria, diarrhea.  She had significant weight loss in last few months.  Initial evaluation showed pulse 96, blood pressure 105/71, temperature 98. 1.  Lab work showed BUN 27, creatinine 1.2, lactic acid 3.3, albumin 2.8, bilirubin 0.9, , ALT 58, alk phos 668, WBC of 18.8, hemoglobin 8.8, platelet 277.  Chest x-ray showed no acute cardiac pulmonary process.  UA was negative for UTI . Patient was treated with empiric aztreonam and vanco for possible sepsis which was ruled out with negative blood cultures , CXR, UA and absence of symptoms for infection. Patient was treated with aggressive fluid resuscitation and lactic acidosis resolved     Review of Systems:     Constitutional:  negative for chills, fevers, sweats. Chronic fatigue and decreased appetite continues  Respiratory:  negative for cough, dyspnea on exertion, hemoptysis, shortness of breath, wheezing  Cardiovascular:  negative for chest pain, chest pressure/discomfort, lower extremity edema, palpitations  Gastrointestinal:  negative for abdominal pain, constipation, diarrhea, nausea, vomiting  Neurological:  negative for dizziness, headache    Medications: Allergies: Allergies   Allergen Reactions    Aspirin Anaphylaxis    Penicillins     Iv Dye [Iodides] Hives       Current Meds:   Scheduled Meds:    metoprolol tartrate  12.5 mg Oral BID    pantoprazole  40 mg Oral BID AC    sodium chloride  250 mL Intravenous Once    iron polysaccharides  150 mg Oral BID    senna  1 tablet Oral Nightly    docusate sodium  100 mg Oral Daily    mometasone-formoterol  2 puff Inhalation BID    fluticasone  1 spray Nasal Daily    cetirizine  10 mg Oral Daily    atorvastatin  10 mg Oral Daily    sodium chloride flush  10 mL Intravenous 2 times per day    megestrol  200 mg Oral Daily    oxyCODONE  10 mg Oral 2 times per day     Continuous Infusions:     PRN Meds: albuterol sulfate HFA, cromolyn, sodium chloride, nicotine polacrilex, sodium chloride flush, acetaminophen, oxyCODONE-acetaminophen, ondansetron    Data:     Past Medical History:   has a past medical history of Alcohol abuse, Duodenal adenocarcinoma ([facility] [address] Hyperlipidemia, Hypertension, Liver metastasis ([facility] [address] Liver metastasis ([facility] [address] Pancreatitis, and Stone, kidney. Social History:   reports that she quit smoking about 4 years ago.  Her smoking use no edema, redness, tenderness in the calves  Skin:  no gross lesions, rashes, induration    Assessment:        Primary Problem  SIRS (systemic inflammatory response syndrome) ([facility] [address]    Active Hospital Problems    Diagnosis Date Noted    Anemia in other chronic diseases classified elsewhere [D63.8]     Encounter for palliative care [Z51.5]     Nausea and vomiting [R11.2]     Septicemia ([address] [A41.9]     Lactic acid acidosis [E87.2] 04/13/2019    SIRS (systemic inflammatory response syndrome) (HCC) [R65.10] 04/13/2019    Dehydration [E86.0] 04/13/2019    Moderate malnutrition (HCC) [E44.0] 04/13/2019    Loss of appetite [R63.0] 04/13/2019    Right upper quadrant pain [R10.11] 04/13/2019    Liver metastasis (HCC) [C78.7]     Duodenal adenocarcinoma (HCC) [C17.0]     Chronic bronchitis ([facility] [address] [J42] 05/25/2017    Stage 3 [facility] [N18.3] 06/09/2015    Essential hypertension [I10] 01/27/2012    Mitral valve prolapse [I34.1] 01/27/2012       Plan:        Acute on chronic anemia : S/P Tx 1 U PRBCs, seen GI , no need for EGD given known pathology , continue PPI    SIRS: roled out    Lactic acidosis : resolved with hydration, DC fluids     Dehydration: resolved, advance diet    MARIA VICTORIA: resolved    Hypokalemia: replaced    Leukocytosis: mostly reactive, no evidence of infection    Metastatic duodenal adenocarcinoma with metastases to liver: Plan for outpatient chemo on 4/17, appreciate oncologyrecommendations     Moderate protein calorie malnutrition secondary to decreased appetite: Continue on boost supplements, continue megace     GI prophylaxis    Discussed with the patient in detail in presence of the nurse , changed code status to [facility]      Run of NSVT: cardiology involved, low dose BB added , echo as [facility], MD  4/20/2019  10:42 AM

## 2019-04-20 NOTE — DISCHARGE SUMMARY
months.  Initial evaluation showed pulse 96, blood pressure 105/71, temperature 98. 1.  Lab work showed BUN 27, creatinine 1.2, lactic acid 3.3, albumin 2.8, bilirubin 0.9, , ALT 58, alk phos 668, WBC of 18.8, hemoglobin 8.8, platelet 268.  Chest x-ray showed no acute cardiac pulmonary process.  UA was negative for UTI . Patient was treated with empiric aztreonam and vanco for possible sepsis which was ruled out with negative blood cultures , CXR, UA and absence of symptoms for infection. Patient was treated with aggressive fluid resuscitation and lactic acidosis resolved        During the admission patient was managed as follow    Acute on chronic anemia : S/P Tx 1 U PRBCs, seen GI , no need for EGD given known pathology , continue PPI     SIRS: roled out    Lactic acidosis : resolved with hydration, DC fluids      Dehydration: resolved, advance diet     MARIA VICTORIA: resolved     Hypokalemia: replaced     Leukocytosis: mostly reactive, no evidence of infection     Metastatic duodenal adenocarcinoma with metastases to liver: Plan for outpatient chemo on 4/17, appreciate oncologyrecommendations      Moderate protein calorie malnutrition secondary to decreased appetite: Continue on boost supplements, continue megace      GI prophylaxis     Discussed with the patient in detail in presence of the nurse , changed code status to [facility]       Run of NSVT: cardiology involved, low dose BB added , echo as OP      DC planning      Significant Diagnostic Studies:     Radiology:    Xr Chest Standard (2 Vw)    Result Date: 4/13/2019  EXAMINATION: TWO VIEWS OF THE CHEST 4/13/2019 12:52 pm COMPARISON: 02/26/2019 HISTORY: Ordering Physician Provided Reason for Exam: r/o infectious process FINDINGS: The lungs are without acute focal process. No effusion or pneumothorax. The cardiomediastinal silhouette is normal.  Interval left-sided Port-A-Cath placement. The osseous structures are intact without acute process.      No acute process. Consultations:    Consults:     Final Specialist Recommendations/Findings:   IP CONSULT TO INTERNAL MEDICINE  IP CONSULT TO IV TEAM  IP CONSULT TO ONCOLOGY  IP CONSULT TO GI  IP CONSULT TO PALLIATIVE CARE  IP CONSULT TO SPIRITUAL SERVICES  IP CONSULT TO CARDIOLOGY  PALLIATIVE CARE TANIKA      The patient was seen and examined on day of discharge and this discharge summary is in conjunction with any daily progress note from day of discharge. Discharge plan:     Disposition: Home with [address] [facility]    Physician Follow Up:      Becky Hoover MD  [address] [address]  [phone]          Brandi Morales MD  [address]  [phone]             Requiring Further Evaluation/Follow Up POST HOSPITALIZATION/Incidental Findings:     Diet: cardiac diet    Activity: As tolerated    Instructions to Patient:     Discharge Medications:      Medication List      START taking these medications    docusate 100 MG Caps  Commonly known as:  COLACE, DULCOLAX  Take 100 mg by mouth daily  Start taking on:  4/21/2019     iron polysaccharides 150 MG capsule  Commonly known as:  NIFEREX  Take 1 capsule by mouth 2 times daily     megestrol 40 MG/ML suspension  Commonly known as:  MEGACE  Take 5 mLs by mouth daily  Start taking on:  4/21/2019     metoprolol tartrate 25 MG tablet  Commonly known as:  LOPRESSOR  Take 0.5 tablets by mouth 2 times daily     pantoprazole 40 MG tablet  Commonly known as:  PROTONIX  Take 1 tablet by mouth 2 times daily (before meals)        CONTINUE taking these medications    albuterol sulfate  (90 Base) MCG/ACT inhaler  Commonly known as:  PROAIR HFA  Inhale 2 puffs into the lungs every 6 hours as needed for Wheezing     atorvastatin 40 MG tablet  Commonly known as:  LIPITOR  take 1 tablet by mouth once daily     budesonide-formoterol 160-4.5 MCG/ACT Aero  Commonly known as:  SYMBICORT  Inhale 2 puffs into the lungs 2 times daily     cromolyn 4 % ophthalmic solution  Commonly known as:  OPTICROM  Place 1 drop into both eyes 4 times daily     ENSURE ORIGINAL Liqd  Take 1 Can by mouth 3 times daily     oxyCODONE-acetaminophen 5-325 MG per tablet  Commonly known as:  PERCOCET        STOP taking these medications    amLODIPine 5 MG tablet  Commonly known as:  NORVASC     valsartan-hydrochlorothiazide 160-25 MG per tablet  Commonly known as:  DIOVAN-HCT     zoster recombinant adjuvanted vaccine 50 MCG/0.5ML Susr injection  Commonly known as:  [address]           Where to Get Your Medications      These medications were sent to [facility] [address] [facility], [address] [facility]  [address] [facility], [place] [address]    Phone:  [phone]   · docusate 100 MG Caps  · iron polysaccharides 150 MG capsule  · megestrol 40 MG/ML suspension  · metoprolol tartrate 25 MG tablet  · pantoprazole 40 MG tablet         Time Spent on discharge is  35 mins in patient examination, evaluation, counseling as well as medication reconciliation, prescriptions for required medications, discharge plan and follow up. Electronically signed by   Isma Jones MD  4/20/2019  11:06 AM      Thank you Dr. José Antonio Yun MD for the opportunity to be involved in this patient's care.

## 2019-04-20 NOTE — PROGRESS NOTES
Physical Therapy  Facility/Department: [address] ORTHO/MED SURG  Daily Treatment Note  NAME: Stepan Vega  : 1945  MRN: [id number]    Date of Service: 2019    Discharge Recommendations:  Further therapy recommended at discharge. PT Equipment Recommendations  Equipment Needed: Yes  Walker: Rolling(pt would benefit from RW to increase amb distance, declined use this date)    Patient Diagnosis(es): The primary encounter diagnosis was Septicemia ([facility] [address] Diagnoses of Nausea and vomiting, intractability of vomiting not specified, unspecified vomiting type, Dehydration, Right upper quadrant abdominal pain, Liver metastasis ([address] and Duodenal adenocarcinoma ([address] were also pertinent to this visit. has a past medical history of Alcohol abuse, Duodenal adenocarcinoma ([facility] [address] Hyperlipidemia, Hypertension, Liver metastasis ([address] Liver metastasis ([address] Pancreatitis, and Stone, kidney. has a past surgical history that includes Hysterectomy; syl and bso (cervix removed); Pancreas surgery; Upper gastrointestinal endoscopy (N/A, 2019); Upper gastrointestinal endoscopy (2019); and Upper gastrointestinal endoscopy (N/A, 2019). Restrictions  Restrictions/Precautions  Restrictions/Precautions: Fall Risk  Required Braces or Orthoses?: No  Position Activity Restriction  Other position/activity restrictions: up with assist, up as tolerated   Subjective   General  Chart Reviewed: Yes  Response To Previous Treatment: Patient reporting fatigue but able to participate. Family / Caregiver Present: No  Subjective  Subjective: RN agreeable to PT. Pt agreeable to transfer to chair with encouragement. General Comment  Comments: Left in chair; alarm activated; call light in reach.    Pain Screening  Patient Currently in Pain: Yes  Pain Assessment  Pain Assessment: Faces  Perez-Baker Pain Rating: Hurts a little bit  Pain Type: Acute pain  Pain Location: Abdomen  Pain Orientation: Lower;Right  Pain Descriptors: Discomfort  Pain Frequency: Intermittent  Pain Onset: On-going  Clinical Progression: Not changed  Non-Pharmaceutical Pain Intervention(s): Ambulation/Increased Activity; Therapeutic presence  Response to Pain Intervention: Patient Satisfied  Vital Signs  Patient Currently in Pain: Yes       Orientation  Orientation  Overall Orientation Status: Within Normal Limits  Cognition      Objective   Bed mobility  Rolling to Right: Supervision  Supine to Sit: Supervision  Sit to Supine: (pt left seated in chair)  Scooting: Supervision  Comment: increased time to complete bed mob  Transfers  Sit to Stand: Stand by assistance  Stand to sit: Stand by assistance  Bed to Chair: Contact guard assistance  Ambulation  Ambulation?: Yes  Ambulation 1  Surface: level tile  Device: No Device;Hand-Held Assist  Assistance: Contact guard assistance  Quality of Gait: VERY decreased pace, decreased step length, flexed posture  Distance: 5ft bed to chair   Comments: limited by fatigue/endurance. Pt deferred additional amb despite max encouragement   Stairs/Curb  Stairs?: No     Balance  Posture: Good  Sitting - Static: Good  Sitting - Dynamic: Good  Standing - Static: Fair;+  Standing - Dynamic: Fair  Comments: standing balance assessed with HHA, pt stood ~2 mins with CGA prior to amb to chair. No LOB noted, pt c/o fatigue with mobility  Exercises  Knee Long Arc Quad: 10x   Ankle Pumps: 20x   Comments: pt deferred additional exs stating \"I'm tired\". Max encouragement and education on the importance of mobility, pt continued to decline at this time         Assessment   Body structures, Functions, Activity limitations: Decreased functional mobility ; Decreased endurance;Decreased balance;Decreased strength  Assessment: Pt SBA for bed mob and CGA for mobility with HHA (5ft bed to chair) this date.  Pt most limited be decreased strength and endurance  Prognosis: Fair  Patient Education: importance of amb, ankle pumps for DVT prevention  REQUIRES PT FOLLOW UP: Yes  Activity Tolerance  Activity Tolerance: Patient limited by fatigue;Patient limited by endurance     Goals  Short term goals  Time Frame for Short term goals: 10 visits  Short term goal 1: Transfers with SBA  Short term goal 2: amb 125 ft with a RW x SBA  Short term goal 3: ascend/descend 4 steps with SBA  Short term goal 4: 20-30 min exercise programx SBA  Patient Goals   Patient goals : Return home    Plan    Plan  Times per week: 5-6x wk  Current Treatment Recommendations: Strengthening, Transfer Training, Endurance Training, Gait Training, Functional Mobility Training, Stair training, Safety Education & Training  Safety Devices  Type of devices:  All fall risk precautions in place, Left in chair, Patient at risk for falls, Call light within reach, Gait belt, Chair alarm in place, Nurse notified  Restraints  Initially in place: No     Therapy Time   Individual Concurrent Group Co-treatment   Time In 0823         Time Out 0840         Minutes [place], [place]

## 2019-04-20 NOTE — CARE COORDINATION
Spoke with pt and Suellen Nichole re:transition planning. Pt will stay with Suellen Nichole. Confirmed plan for home with [facility]. States she has been walking without walker in fay. Declines other needs. [facility]- notified of discharge today and address of [place]. Due to holiday tomorrow they will schedule visit for Monday.     Discharge [facility] Case Management Department  Written by: Reginia Bernheim, RN    Patient Name: Kati Rivera  Attending Provider: Kyle Mishra MD  Admit Date: 2019 12:21 PM  MRN: [id number]  Account: [de-identified]                     : 1945  Discharge Date:       Disposition: home    Reginia Bernheim, RN

## 2019-04-20 NOTE — PROGRESS NOTES
Patient's bp 99/65 with a heart rate of 87. Paged Dr. Esther Crawley and he stated to change her metoprolol to 12.5 mg 2 times a day and it is ok to give dose this morning.  Will continue to monitor

## 2019-04-20 NOTE — PROGRESS NOTES
CLINICAL PHARMACY NOTE: MEDS TO [address] Select Patient?: Yes  Total # of Prescriptions Filled: 4   The following medications were delivered to the patient:  · DOK  · METOPROLOL  · PROTONIX  · MEGESTROL  Total # of Interventions Completed: 0  Time Spent (min): 0    Additional Documentation:

## 2019-04-21 ENCOUNTER — CARE COORDINATION (OUTPATIENT)
Dept: CASE MANAGEMENT | Age: 74
End: 2019-04-21

## 2019-04-21 DIAGNOSIS — R10.84 GENERALIZED ABDOMINAL PAIN: Primary | ICD-10-CM

## 2019-04-21 PROCEDURE — 1111F DSCHRG MED/CURRENT MED MERGE: CPT

## 2019-04-21 NOTE — CARE COORDINATION
[facility] Transitions Initial Follow Up Call    Call within 2 business days of discharge: Yes    Patient: Eric Segundo Patient : 1945   MRN: <[id number]>  Reason for Admission: septicemia  Discharge Date: 19 RARS: Readmission Risk Score: 27      Last Discharge [facility]       Complaint Diagnosis Description Type Department Provider    19 Fatigue; Emesis Septicemia ([facility] [address] [facility] . .. ED to Hosp-Admission (Discharged) (ADMITTED) Hao Patel MD; Krystal Pop. ..            # 1 attempt- unable to reach patient, left vm message with name and call back number, requested call back//JU    Facility: [facility]    Non-face-to-face services provided:  Scheduled appointment with PCP-sent request to [facility] connections [place] for TCM visit  Communication with home health agencies or other community services the patient is currently using-called  [facility] closed on 19    Care Transitions 24 Hour Call    Do you have all of your prescriptions and are they filled?:  Yes  Do you have support at home?:  Alone  Are you an active caregiver in your home?:  No  Care Transitions Interventions         Follow Up  Future Appointments   Date Time Provider Suresh Powell   2019 10:35 AM Huong Moses MD [facility] BRANDON Irene RN

## 2019-04-22 NOTE — CARE COORDINATION
[facility] Transitions Initial Follow Up Call    Call within 2 business days of discharge: Yes    Patient: Davy Morales Patient : 1945   MRN: <[id number]>  Reason for Admission: SIRS, Duodenal cancer with liver mets  Discharge Date: 19 RARS: Readmission Risk Score: 27     Spoke with: Jaylan Sauceda, friend Emmie West and niece Deyanira Hinds (also POA)    Facility: [facility]  Non-face-to-face services provided:  Obtained and reviewed discharge summary and/or continuity of care documents  Communication with home health agencies or other community services the patient is currently [address] with [facility], she sounds weak and fatigued today. She states she vomited yesterday, no n/v today. She has some RUQ abdominal pain today but states is better than when she was first admitted. She reviewed some medications then handed the phone to her friend Emmie West. Emmie West verified all medications and says they are scheduling her follow up appointments today. He did ask how to get patient to eat. She is taking Megace but says she just does not want to eat anything, she has poor PO intake with food and fluids. Explained that the lack of appetite is d/t the CA and location of her cancer. Encouraged him to try to get her to take in fluids to prevent dehydration and Boost supplements. Deyanira Hinds, patient's niece and POA called and stated that they have concerns that patient is not eating or drinking very much. Patient has elected not to do treatment and per oncology notes, they had suggested palliative/hospice care. Deyanira Hinds stated she really did not know what MD's were saying as she was not here in the hospital with patient this time. I explained that prognosis was poor and what oncology had recommended. She was going to check with patient today when she saw her about her goals of care. Expressed to call [facility] if she needed any further information or had questions or more concerns. [place] called to verify that nurse visit would be today. Left message for nurse to discuss goals of care with patient.       Care Transitions 24 Hour Call    Schedule Follow Up Appointment with PCP:  Completed  Do you have any ongoing symptoms?:  Yes  Patient-reported symptoms:  Vomiting, Abdominal Pain (Comment: RUQ abdominal pain, moderate)  Do you have a copy of your discharge instructions?:  Yes  Do you have all of your prescriptions and are they filled?:  Yes  Have you been contacted by a [address]?:  No  Have you scheduled your follow up appointment?:  No (Comment: Patient's friend Rosa M Brown to schedule follow up)  Were you discharged with any Home Care or Post Acute Services:  No  Do you have support at home?:  Alone  Do you feel like you have everything you need to keep you well at home?:  Yes  Are you an active caregiver in your home?:  No  Care Transitions Interventions         Follow Up  Future Appointments   Date Time Provider Suresh Powell   6/28/2019 10:35 AM Phillip Pollock MD [facility] BRANDON Tenorio RN

## 2019-04-24 ENCOUNTER — APPOINTMENT (OUTPATIENT)
Dept: GENERAL RADIOLOGY | Age: 74
End: 2019-04-24
Payer: MEDICARE

## 2019-04-24 ENCOUNTER — HOSPITAL ENCOUNTER (EMERGENCY)
Age: 74
Discharge: HOME | End: 2019-04-24
Attending: EMERGENCY MEDICINE
Payer: MEDICARE

## 2019-04-24 VITALS
SYSTOLIC BLOOD PRESSURE: 116 MMHG | HEART RATE: 73 BPM | WEIGHT: 166 LBS | DIASTOLIC BLOOD PRESSURE: 76 MMHG | OXYGEN SATURATION: 98 % | TEMPERATURE: 97.9 F | BODY MASS INDEX: 29.41 KG/M2 | RESPIRATION RATE: 19 BRPM

## 2019-04-24 DIAGNOSIS — C17.0 ADENOCARCINOMA OF DUODENUM (HCC): Primary | ICD-10-CM

## 2019-04-24 DIAGNOSIS — C78.7 LIVER METASTASIS (HCC): ICD-10-CM

## 2019-04-24 LAB
ABSOLUTE EOS #: 0.18 K/UL (ref 0–0.4)
ABSOLUTE IMMATURE GRANULOCYTE: 0.18 K/UL (ref 0–0.3)
ABSOLUTE LYMPH #: 3.33 K/UL (ref 1–4.8)
ABSOLUTE MONO #: 0.88 K/UL (ref 0.1–0.8)
ALBUMIN SERPL-MCNC: 2.2 G/DL (ref 3.5–5.2)
ALBUMIN/GLOBULIN RATIO: 0.6 (ref 1–2.5)
ALP BLD-CCNC: 450 U/L (ref 35–104)
ALT SERPL-CCNC: 36 U/L (ref 5–33)
ANION GAP SERPL CALCULATED.3IONS-SCNC: 12 MMOL/L (ref 9–17)
AST SERPL-CCNC: 107 U/L
BASOPHILS # BLD: 0 % (ref 0–2)
BASOPHILS ABSOLUTE: 0 K/UL (ref 0–0.2)
BILIRUB SERPL-MCNC: 1.43 MG/DL (ref 0.3–1.2)
BUN BLDV-MCNC: 10 MG/DL (ref 8–23)
BUN/CREAT BLD: ABNORMAL (ref 9–20)
CALCIUM SERPL-MCNC: 9 MG/DL (ref 8.6–10.4)
CHLORIDE BLD-SCNC: 108 MMOL/L (ref 98–107)
CO2: 22 MMOL/L (ref 20–31)
CREAT SERPL-MCNC: 0.64 MG/DL (ref 0.5–0.9)
DIFFERENTIAL TYPE: ABNORMAL
EOSINOPHILS RELATIVE PERCENT: 1 % (ref 1–4)
GFR AFRICAN AMERICAN: >60 ML/MIN
GFR NON-AFRICAN AMERICAN: >60 ML/MIN
GFR SERPL CREATININE-BSD FRML MDRD: ABNORMAL ML/MIN/{1.73_M2}
GFR SERPL CREATININE-BSD FRML MDRD: ABNORMAL ML/MIN/{1.73_M2}
GLUCOSE BLD-MCNC: 97 MG/DL (ref 70–99)
HCT VFR BLD CALC: 30 % (ref 36.3–47.1)
HEMOGLOBIN: 8.9 G/DL (ref 11.9–15.1)
IMMATURE GRANULOCYTES: 1 %
LIPASE: 48 U/L (ref 13–60)
LYMPHOCYTES # BLD: 19 % (ref 24–44)
MCH RBC QN AUTO: 24.2 PG (ref 25.2–33.5)
MCHC RBC AUTO-ENTMCNC: 29.7 G/DL (ref 28.4–34.8)
MCV RBC AUTO: 81.5 FL (ref 82.6–102.9)
MONOCYTES # BLD: 5 % (ref 1–7)
MORPHOLOGY: ABNORMAL
NRBC AUTOMATED: 0.2 PER 100 WBC
PDW BLD-RTO: 20.9 % (ref 11.8–14.4)
PLATELET # BLD: 445 K/UL (ref 138–453)
PLATELET ESTIMATE: ABNORMAL
PMV BLD AUTO: 10.3 FL (ref 8.1–13.5)
POTASSIUM SERPL-SCNC: 3.5 MMOL/L (ref 3.7–5.3)
RBC # BLD: 3.68 M/UL (ref 3.95–5.11)
RBC # BLD: ABNORMAL 10*6/UL
SEG NEUTROPHILS: 74 % (ref 36–66)
SEGMENTED NEUTROPHILS ABSOLUTE COUNT: 12.93 K/UL (ref 1.8–7.7)
SODIUM BLD-SCNC: 142 MMOL/L (ref 135–144)
TOTAL PROTEIN: 6 G/DL (ref 6.4–8.3)
WBC # BLD: 17.5 K/UL (ref 3.5–11.3)
WBC # BLD: ABNORMAL 10*3/UL

## 2019-04-24 PROCEDURE — 2580000003 HC RX 258: Performed by: NURSE PRACTITIONER

## 2019-04-24 PROCEDURE — 96376 TX/PRO/DX INJ SAME DRUG ADON: CPT

## 2019-04-24 PROCEDURE — 96375 TX/PRO/DX INJ NEW DRUG ADDON: CPT

## 2019-04-24 PROCEDURE — 93005 ELECTROCARDIOGRAM TRACING: CPT

## 2019-04-24 PROCEDURE — 6360000002 HC RX W HCPCS: Performed by: NURSE PRACTITIONER

## 2019-04-24 PROCEDURE — 85025 COMPLETE CBC W/AUTO DIFF WBC: CPT

## 2019-04-24 PROCEDURE — 80053 COMPREHEN METABOLIC PANEL: CPT

## 2019-04-24 PROCEDURE — 99285 EMERGENCY DEPT VISIT HI MDM: CPT

## 2019-04-24 PROCEDURE — 83690 ASSAY OF LIPASE: CPT

## 2019-04-24 PROCEDURE — 74022 RADEX COMPL AQT ABD SERIES: CPT

## 2019-04-24 PROCEDURE — 96374 THER/PROPH/DIAG INJ IV PUSH: CPT

## 2019-04-24 RX ORDER — MORPHINE SULFATE 4 MG/ML
4 INJECTION, SOLUTION INTRAMUSCULAR; INTRAVENOUS ONCE
Status: COMPLETED | OUTPATIENT
Start: 2019-04-24 | End: 2019-04-24

## 2019-04-24 RX ORDER — MORPHINE SULFATE 4 MG/ML
2 INJECTION, SOLUTION INTRAMUSCULAR; INTRAVENOUS ONCE
Status: COMPLETED | OUTPATIENT
Start: 2019-04-24 | End: 2019-04-24

## 2019-04-24 RX ORDER — 0.9 % SODIUM CHLORIDE 0.9 %
500 INTRAVENOUS SOLUTION INTRAVENOUS ONCE
Status: COMPLETED | OUTPATIENT
Start: 2019-04-24 | End: 2019-04-24

## 2019-04-24 RX ORDER — ONDANSETRON 2 MG/ML
4 INJECTION INTRAMUSCULAR; INTRAVENOUS ONCE
Status: COMPLETED | OUTPATIENT
Start: 2019-04-24 | End: 2019-04-24

## 2019-04-24 RX ADMIN — ONDANSETRON 4 MG: 2 INJECTION INTRAMUSCULAR; INTRAVENOUS at 12:33

## 2019-04-24 RX ADMIN — ONDANSETRON 4 MG: 2 INJECTION INTRAMUSCULAR; INTRAVENOUS at 18:53

## 2019-04-24 RX ADMIN — MORPHINE SULFATE 2 MG: 4 INJECTION INTRAVENOUS at 12:33

## 2019-04-24 RX ADMIN — SODIUM CHLORIDE 500 ML: 9 INJECTION, SOLUTION INTRAVENOUS at 12:33

## 2019-04-24 RX ADMIN — MORPHINE SULFATE 4 MG: 4 INJECTION INTRAVENOUS at 18:53

## 2019-04-24 RX ADMIN — MORPHINE SULFATE 2 MG: 4 INJECTION INTRAVENOUS at 13:04

## 2019-04-24 ASSESSMENT — ENCOUNTER SYMPTOMS
VOMITING: 1
SHORTNESS OF BREATH: 0
ABDOMINAL PAIN: 1
NAUSEA: 1
DIARRHEA: 1

## 2019-04-24 ASSESSMENT — PAIN DESCRIPTION - ONSET: ONSET: SUDDEN

## 2019-04-24 ASSESSMENT — PAIN DESCRIPTION - DESCRIPTORS: DESCRIPTORS: ACHING

## 2019-04-24 ASSESSMENT — PAIN SCALES - GENERAL
PAINLEVEL_OUTOF10: 10
PAINLEVEL_OUTOF10: 10
PAINLEVEL_OUTOF10: 9
PAINLEVEL_OUTOF10: 9

## 2019-04-24 ASSESSMENT — PAIN DESCRIPTION - FREQUENCY: FREQUENCY: CONTINUOUS

## 2019-04-24 ASSESSMENT — PAIN DESCRIPTION - PAIN TYPE: TYPE: ACUTE PAIN

## 2019-04-24 ASSESSMENT — PAIN DESCRIPTION - LOCATION: LOCATION: ABDOMEN

## 2019-04-24 NOTE — CARE COORDINATION
Met briefly with patient and Emmie West at bedside, Deyanira Hinds the POA has been called, she has stated that she will be here shortly. 1622:  [facility] D[facility] is at bedside, the patient and Deyanira Hinds are agreeable to have [address] come to hospital and explain their services to the patient, POA, pt's sister, and Emmie West. 1800: family has agreed to [facility], there is a bed available at the [facility] facility    Call placed to [facility], earliest patient could be transported to the facility is 2330. Patient will be transported by [address] at [address], face sheet and certification of necessity left with RN for [address].

## 2019-04-24 NOTE — ED PROVIDER NOTES
[facility]     Emergency Department     Faculty Attestation    I performed a history and physical examination of the patient and discussed management with the resident. I have reviewed and agree with the residents findings including all diagnostic interpretations, and treatment plans as written. Any areas of disagreement are noted on the chart. I was personally present for the key portions of any procedures. I have documented in the chart those procedures where I was not present during the key portions. I have reviewed the emergency nurses triage note. I agree with the chief complaint, past medical history, past surgical history, allergies, medications, social and family history as documented unless otherwise noted below. Documentation of the HPI, Physical Exam and Medical Decision Making performed by scribcindy is based on my personal performance of the HPI, PE and MDM. For Physician Assistant/ Nurse Practitioner cases/documentation I have personally evaluated this patient and have completed at least one if not all key elements of the E/M (history, physical exam, and MDM). Additional findings are as noted. Primary Care Physician: José Antonio Yun MD    History: This is a 72 y.o. female who presents to the Emergency Department with complaint of abdominal pain. 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 She was recently hospitalized and discharged. Her code status is DNR CCA. She is accompanied by a close friend who she has lived with for many years. Her POA is Virgie Purpura. He was not been present at all during her admission or at present today. She was seen by he mom, who recommended palliative care. Patient was discharged home. She was not discharged home with any pain medication, her friend Dinah Moore thought that her pain medication was metoprolol. Physical:   weight is 166 lb (75.3 kg).  Her oral temperature is 97.9 °F (36.6 °C). Her blood pressure is 116/76 and her pulse is 86. Her respiration is 24 and oxygen saturation is 96%. Patient's abdomen shows multiple surgical scars, it is tender to palpation diffusely more prominent in the right upper quadrant. She does have voluntary guarding. Impression: Labs, abdominal series    Plan: Palliative care consult, after imaging and labs are reviewed. Pain control, will try to get a hold of POA, patient's friend Wilman Grullon says that she is coming this afternoon. EKG Interpretation    Interpreted by me    EKG does show normal sinus rhythm with frequent PVCs. Normal axis, no ST changes noted.       Iona Mercado D.O, M.P.H  Attending Emergency Medicine Physician         Iona Mercado,   04/24/19 7446

## 2019-04-24 NOTE — ED NOTES
Pt given water and pillow, repositioned in bed. Pt denies any other needs at this time. Awaiting hospice arrival. Family remains at bedside. Will continue to monitor.       Mary Davis, JENNIFER  04/24/19 [id number]

## 2019-04-24 NOTE — ED PROVIDER NOTES
kidney. has a past surgical history that includes Hysterectomy; syl and bso (cervix removed); Pancreas surgery; Upper gastrointestinal endoscopy (N/A, 2019); Upper gastrointestinal endoscopy (2019); and Upper gastrointestinal endoscopy (N/A, 2019). Social History     Socioeconomic History    Marital status: Single     Spouse name: Not on file    Number of children: Not on file    Years of education: Not on file    Highest education level: Not on file   Occupational History    Not on file   Social Needs    Financial resource strain: Not on file    Food insecurity:     Worry: Not on file     Inability: Not on file    Transportation needs:     Medical: Not on file     Non-medical: Not on file   Tobacco Use    Smoking status: Former Smoker     Packs/day: 0.20     Years: 45.00     Pack years: 9.00     Types: Cigarettes     Last attempt to quit: 2014     Years since quittin.8    Smokeless tobacco: Never Used   Substance and Sexual Activity    Alcohol use: Yes     Alcohol/week: 0.6 oz     Types: 1 Cans of beer per week    Drug use: No    Sexual activity: Not on file   Lifestyle    Physical activity:     Days per week: Not on file     Minutes per session: Not on file    Stress: Not on file   Relationships    Social connections:     Talks on phone: Not on file     Gets together: Not on file     Attends Evangelical service: Not on file     Active member of club or organization: Not on file     Attends meetings of clubs or organizations: Not on file     Relationship status: Not on file    Intimate partner violence:     Fear of current or ex partner: Not on file     Emotionally abused: Not on file     Physically abused: Not on file     Forced sexual activity: Not on file   Other Topics Concern    Not on file   Social History Narrative    Not on file       History reviewed. No pertinent family history.     Allergies:  Aspirin; Penicillins; and Iv dye [iodides]    Home immunocompromised state. PHYSICALEXAM   (upto 7 for level 4, 8 or more for level 5)      INITIAL VITALS:  weight is 166 lb (75.3 kg). Her oral temperature is 97.9 °F (36.6 °C). Her blood pressure is 116/76 and her pulse is 73. Her respiration is 19 and oxygen saturation is 98%. Physical Exam   Constitutional: She is oriented to person, place, and time. She appears well-developed and well-nourished. No distress. HENT:   Head: Normocephalic. Eyes: Pupils are equal, round, and reactive to light. Neck: Normal range of motion. Neck supple. Cardiovascular: Normal rate and regular rhythm. Pulmonary/Chest: Effort normal and breath sounds normal. No respiratory distress. Abdominal: Soft. There is tenderness. There is no rebound. Musculoskeletal: Normal range of motion. Neurological: She is alert and oriented to person, place, and time. Skin: Skin is warm and dry. Capillary refill takes less than 2 seconds. Psychiatric: She has a normal mood and affect. Her behavior is normal. Judgment and thought content normal.   Nursing note and vitals reviewed.       DIFFERENTIAL  DIAGNOSIS   Cancer, uncontrolled pain, intestinal obstruction    PLAN (LABS / IMAGING / EKG):  Orders Placed This Encounter   Procedures    XR Acute Abd Series Chest 1 VW    CBC Auto Differential    Comprehensive Metabolic Panel    LIPASE    Inpatient consult to Palliative Care    EKG 12 Lead       MEDICATIONS ORDERED:  Orders Placed This Encounter   Medications    ondansetron (ZOFRAN) injection 4 mg    0.9 % sodium chloride bolus    morphine injection 2 mg    morphine injection 2 mg    ondansetron (ZOFRAN) injection 4 mg    morphine injection 4 mg       Controlled Substances Monitoring:      DIAGNOSTIC RESULTS / EMERGENCY DEPARTMENT COURSE / MDM     RADIOLOGY:   I directly visualized(with the attending physician) the following  images and reviewed the radiologist interpretations:  Xr Chest Standard (2 Vw)    Result Date: 4/13/2019  EXAMINATION: TWO VIEWS OF THE CHEST 4/13/2019 12:52 pm COMPARISON: 02/26/2019 HISTORY: Ordering Physician Provided Reason for Exam: r/o infectious process FINDINGS: The lungs are without acute focal process. No effusion or pneumothorax. The cardiomediastinal silhouette is normal.  Interval left-sided Port-A-Cath placement. The osseous structures are intact without acute process. No acute process. XR Acute Abd Series Chest 1 VW   Final Result   1. No acute cardiopulmonary disease. 2. Nonobstructive bowel gas pattern.              LABS:  Results for orders placed or performed during the hospital encounter of 04/24/19   CBC Auto Differential   Result Value Ref Range    WBC 17.5 (H) 3.5 - 11.3 k/uL    RBC 3.68 (L) 3.95 - 5.11 m/uL    Hemoglobin 8.9 (L) 11.9 - 15.1 g/dL    Hematocrit 30.0 (L) 36.3 - 47.1 %    MCV 81.5 (L) 82.6 - 102.9 fL    MCH 24.2 (L) 25.2 - 33.5 pg    MCHC 29.7 28.4 - 34.8 g/dL    RDW 20.9 (H) 11.8 - 14.4 %    Platelets 171 839 - 989 k/uL    MPV 10.3 8.1 - 13.5 fL    NRBC Automated 0.2 (H) 0.0 per 100 WBC    Differential Type NOT REPORTED     WBC Morphology NOT REPORTED     RBC Morphology NOT REPORTED     Platelet Estimate NOT REPORTED     Immature Granulocytes 1 (H) 0 %    Seg Neutrophils 74 (H) 36 - 66 %    Lymphocytes 19 (L) 24 - 44 %    Monocytes 5 1 - 7 %    Eosinophils % 1 1 - 4 %    Basophils 0 0 - 2 %    Absolute Immature Granulocyte 0.18 0.00 - 0.30 k/uL    Segs Absolute 12.93 (H) 1.8 - 7.7 k/uL    Absolute Lymph # 3.33 1.0 - 4.8 k/uL    Absolute Mono # 0.88 (H) 0.1 - 0.8 k/uL    Absolute Eos # 0.18 0.0 - 0.4 k/uL    Basophils # 0.00 0.0 - 0.2 k/uL    Morphology MICROCYTOSIS PRESENT     Morphology 1+ POLYCHROMASIA     Morphology ANISOCYTOSIS PRESENT    Comprehensive Metabolic Panel   Result Value Ref Range    Glucose 97 70 - 99 mg/dL    BUN 10 8 - 23 mg/dL    CREATININE 0.64 0.50 - 0.90 mg/dL    Bun/Cre Ratio NOT REPORTED 9 - 20    Calcium 9.0 8.6 - 10.4 mg/dL Sodium 142 135 - 144 mmol/L    Potassium 3.5 (L) 3.7 - 5.3 mmol/L    Chloride 108 (H) 98 - 107 mmol/L    CO2 22 20 - 31 mmol/L    Anion Gap 12 9 - 17 mmol/L    Alkaline Phosphatase 450 (H) 35 - 104 U/L    ALT 36 (H) 5 - 33 U/L     (H) <32 U/L    Total Bilirubin 1.43 (H) 0.3 - 1.2 mg/dL    Total Protein 6.0 (L) 6.4 - 8.3 g/dL    Alb 2.2 (L) 3.5 - 5.2 g/dL    Albumin/Globulin Ratio 0.6 (L) 1.0 - 2.5    GFR Non-African American >60 >60 mL/min    GFR African American >60 >60 mL/min    GFR Comment          GFR Staging NOT REPORTED    LIPASE   Result Value Ref Range    Lipase 48 13 - 60 U/L   EKG 12 Lead   Result Value Ref Range    Ventricular Rate 91 BPM    Atrial Rate 89 BPM    P-R Interval 130 ms    QRS Duration 82 ms    Q-T Interval 352 ms    QTc Calculation (Bazett) 432 ms    P Axis 64 degrees    R Axis 11 degrees    T Axis -59 degrees       EMERGENCY DEPARTMENT COURSE:  I spoke at length with patient regarding her understanding of her cancer status and recommendation for hospice. Patient cannot recall any conversations regarding hospice or even a conversation with an oncologist during her recent visit. Do not believe patient is medically competent to make her own decisions. We will try to contact her POA did discuss this matter. Arnel Crawford does arrive with patient's home medications. No medications or cancer pain noted. He does understand that she has cancer with poor prognosis. He states he has contacted the POA and asked for her to come to the emergency room however she's not available for several hours  1600: Patient reports she has complete pain control and minimal nausea. She declines any further Zofran at this time. Sister, Darío Phillips at bedside for support. 1630: Update provided to patient.   They understand hospices coming around 5:30 to discuss outpatient options  1800 advised by hospice staff that patient have family have agreed to inpatient status in hospice unit  1900: DNR CC signed with family,

## 2019-04-24 NOTE — ED PROVIDER NOTES
[facility]  Emergency Department  Faculty Sign-Out Addendum     Care of Severo Spruce was assumed from previous attending and is being seen for Abdominal Pain  . The patient's initial evaluation and plan have been discussed with the prior provider who initially evaluated the patient. EMERGENCY DEPARTMENT COURSE / MEDICAL DECISION MAKING:       MEDICATIONS GIVEN:  Orders Placed This Encounter   Medications    ondansetron (ZOFRAN) injection 4 mg    0.9 % sodium chloride bolus    morphine injection 2 mg    morphine injection 2 mg       LABS / RADIOLOGY:     Labs Reviewed   CBC WITH AUTO DIFFERENTIAL - Abnormal; Notable for the following components:       Result Value    WBC 17.5 (*)     RBC 3.68 (*)     Hemoglobin 8.9 (*)     Hematocrit 30.0 (*)     MCV 81.5 (*)     MCH 24.2 (*)     RDW 20.9 (*)     NRBC Automated 0.2 (*)     Immature Granulocytes 1 (*)     Seg Neutrophils 74 (*)     Lymphocytes 19 (*)     Segs Absolute 12.93 (*)     Absolute Mono # 0.88 (*)     All other components within normal limits   COMPREHENSIVE METABOLIC PANEL - Abnormal; Notable for the following components:    Potassium 3.5 (*)     Chloride 108 (*)     Alkaline Phosphatase 450 (*)     ALT 36 (*)      (*)     Total Bilirubin 1.43 (*)     Total Protein 6.0 (*)     Alb 2.2 (*)     Albumin/Globulin Ratio 0.6 (*)     All other components within normal limits   LIPASE       Xr Chest Standard (2 Vw)    Result Date: 4/13/2019  EXAMINATION: TWO VIEWS OF THE CHEST 4/13/2019 12:52 pm COMPARISON: 02/26/2019 HISTORY: Ordering Physician Provided Reason for Exam: r/o infectious process FINDINGS: The lungs are without acute focal process. No effusion or pneumothorax. The cardiomediastinal silhouette is normal.  Interval left-sided Port-A-Cath placement. The osseous structures are intact without acute process. No acute process.      Xr Acute Abd Series Chest 1 Vw    Result Date: 4/24/2019  EXAMINATION: TWO XRAY VIEWS OF THE ABDOMEN AND SINGLE  XRAY VIEW OF THE CHEST 4/24/2019 1:12 pm COMPARISON: 04/13/2019 HISTORY: ORDERING SYSTEM PROVIDED HISTORY: diffuse pain; hx cancer TECHNOLOGIST PROVIDED HISTORY: diffuse pain; hx cancer FINDINGS: A left subclavian MediPort distal tip is at the cavoatrial junction. No focal airspace consolidation, pleural effusion or pneumothorax is demonstrated. The heart is normal in size. There is no evidence of bowel obstruction or free air. Cholecystectomy clips are present. No urinary tract calculi are seen. No acute osseous abnormality is demonstrated. 1. No acute cardiopulmonary disease. 2. Nonobstructive bowel gas pattern. RECENT VITALS:     Temp: 97.9 °F (36.6 °C),  Pulse: 73, Resp: 19, BP: 116/76, SpO2: 98 %    This patient is a 72 y.o. Female with abdominal pain. Hospice patient. Sx treatment, comfort care. Awaiting hospice eval    OUTSTANDING TASKS / RECOMMENDATIONS:    1.  Hospice eval      Jasmyne Burch MD  Attending Emergency Physician  [facility] ED        Stephie Marin MD  04/24/19 1120

## 2019-04-24 NOTE — CARE COORDINATION
TC received from [facility]. nurse with Michelle reporting that patient was currently back in the ED d/t blood in her stool. Met with Karlee CHU to discuss patient. Patient was just discharged recently and in care transitions. It was scheduled for [facility] to meet with the patient yesterday on 4/23/19 but this was cancelled by her SO, Yudi Mitchell. Patient is Hospice appropriate per Dr. Manuel Neighbor. I spoke with both patient and Yudi Mitchell in her room. Patient is somnolent, did not engage in conversation. Per Yudi Mitchell, he is awaiting for patient's niece who is POA to arrive and discuss with her about talking with Hospice services. Hospice consult has been ordered.

## 2019-04-24 NOTE — ED NOTES
Labeled blood specimen collected and sent to lab via tube system.        Fredo Machado RN  04/24/19 2801

## 2019-04-24 NOTE — ED NOTES
Pt and family agree to hospice care. Blessing, care coordinator setting up transportation to hospice.       Yang Graff RN  04/24/19 7179

## 2019-04-25 LAB
EKG ATRIAL RATE: 89 BPM
EKG P AXIS: 64 DEGREES
EKG P-R INTERVAL: 130 MS
EKG Q-T INTERVAL: 352 MS
EKG QRS DURATION: 82 MS
EKG QTC CALCULATION (BAZETT): 432 MS
EKG R AXIS: 11 DEGREES
EKG T AXIS: -59 DEGREES
EKG VENTRICULAR RATE: 91 BPM

## 2019-05-13 PROBLEM — E86.0 DEHYDRATION: Status: RESOLVED | Noted: 2019-04-13 | Resolved: 2019-05-13

## 2019-07-08 ENCOUNTER — TELEPHONE (OUTPATIENT)
Dept: INTERNAL MEDICINE | Age: 74
End: 2019-07-08

## 2021-01-15 NOTE — PATIENT INSTRUCTIONS
Return To Clinic 2/1/19. After Visit Summary given and reviewed. bb    It is very important for your care that you keep your appointment. If for some reason you are unable to keep your appointment it is equally important that you call our office at [phone] to cancel your appointment and reschedule. Failure to do so may result in your termination from our practice. Referral for nephrology sent to [facility]  they will call pt for appt, copy of referral with number and address given to pt. Pt should call referral number if not heard from within a couple of weeks. LABORATORY INSTRUCTIONS    Your doctor has ordered blood or urine testing. You can get this testing done at the Lab located on the first floor of the [facility], or at any other [facility]. Please stop at Main Registration, before going to the lab, as you must be registered first.     Please get this lab done before your next visit.     You may eat or drink before this test.
(0) indicator not present

## 2022-10-06 NOTE — TELEPHONE ENCOUNTER
E-scribed.  Thank you Xeljanz Counseling: I discussed with the patient the risks of Xeljanz therapy including increased risk of infection, liver issues, headache, diarrhea, or cold symptoms. Live vaccines should be avoided. They were instructed to call if they have any problems.

## (undated) DEVICE — FORCEPS BX L240CM JAW DIA22MM ORNG STD CAP W NDL RAD JAW 4

## (undated) DEVICE — DISPOSABLE DISTAL ATTACHMENT: Brand: DISPOSABLE DISTAL ATTACHMENT

## (undated) DEVICE — BIPOLAR ELECTROHEMOSTASIS CATHETER: Brand: INJECTION GOLD PROBE

## (undated) DEVICE — FORCEPS BX L240CM WRK CHN 2.8MM STD CAP W/ NDL MIC MESH